# Patient Record
Sex: MALE | Race: WHITE | NOT HISPANIC OR LATINO | Employment: OTHER | URBAN - METROPOLITAN AREA
[De-identification: names, ages, dates, MRNs, and addresses within clinical notes are randomized per-mention and may not be internally consistent; named-entity substitution may affect disease eponyms.]

---

## 2021-05-26 ENCOUNTER — RECORDS - HEALTHEAST (OUTPATIENT)
Dept: ADMINISTRATIVE | Facility: CLINIC | Age: 61
End: 2021-05-26

## 2021-05-31 ENCOUNTER — RECORDS - HEALTHEAST (OUTPATIENT)
Dept: ADMINISTRATIVE | Facility: CLINIC | Age: 61
End: 2021-05-31

## 2021-08-30 ENCOUNTER — HOSPITAL ENCOUNTER (EMERGENCY)
Facility: HOSPITAL | Age: 61
Discharge: HOME | End: 2021-08-30
Attending: EMERGENCY MEDICINE | Admitting: EMERGENCY MEDICINE
Payer: COMMERCIAL

## 2021-08-30 ENCOUNTER — APPOINTMENT (OUTPATIENT)
Dept: RADIOLOGY | Facility: HOSPITAL | Age: 61
End: 2021-08-30
Attending: EMERGENCY MEDICINE
Payer: COMMERCIAL

## 2021-08-30 VITALS
DIASTOLIC BLOOD PRESSURE: 66 MMHG | HEART RATE: 76 BPM | WEIGHT: 175 LBS | TEMPERATURE: 97.5 F | RESPIRATION RATE: 14 BRPM | SYSTOLIC BLOOD PRESSURE: 119 MMHG | OXYGEN SATURATION: 96 %

## 2021-08-30 DIAGNOSIS — S61.012A LACERATION OF LEFT THUMB WITHOUT FOREIGN BODY WITHOUT DAMAGE TO NAIL, INITIAL ENCOUNTER: ICD-10-CM

## 2021-08-30 PROCEDURE — 12002 RPR S/N/AX/GEN/TRNK2.6-7.5CM: CPT

## 2021-08-30 PROCEDURE — 99283 EMERGENCY DEPT VISIT LOW MDM: CPT

## 2021-08-30 PROCEDURE — 250N000009 HC RX 250

## 2021-08-30 PROCEDURE — 73140 X-RAY EXAM OF FINGER(S): CPT | Mod: LT

## 2021-08-30 RX ORDER — BACITRACIN ZINC 500 [USP'U]/G
OINTMENT TOPICAL ONCE
Status: COMPLETED | OUTPATIENT
Start: 2021-08-30 | End: 2021-08-30

## 2021-08-30 RX ORDER — BACITRACIN ZINC 500 [USP'U]/G
OINTMENT TOPICAL
Status: COMPLETED
Start: 2021-08-30 | End: 2021-08-30

## 2021-08-30 RX ADMIN — BACITRACIN ZINC: 500 OINTMENT TOPICAL at 16:58

## 2021-08-30 NOTE — ED PROVIDER NOTES
EMERGENCY DEPARTMENT ENCOUNTER      NAME: Shaq Ny  AGE: 61 year old male  YOB: 1960  MRN: [id number]  EVALUATION DATE & TIME: No admission date for patient encounter.    PCP: No primary care provider on file.    ED PROVIDER: Gus Childers M.D.      Chief Complaint   Patient presents with     Laceration         FINAL IMPRESSION:  1. Laceration of left thumb without foreign body without damage to nail, initial encounter          ED COURSE & MEDICAL DECISION MAKIN year old male presents to the Emergency Department for evaluation of thumb laceration.  He has a laceration/skin avulsion to his left dorsal thumb.  There does not seem to be any nerve or tendon involvement in this injury.  X-rays negative for fracture or retained foreign body.  There was an extensive amount of devitalized skin over the area, this was quite narrow and very clearly devitalized, so ultimately I decided to debride most of this to allow for loose approximation of the underlying wound.  Wound was anesthetized, irrigated extensively and closed to the best extent allowed by the missing tissue.  Patient will be directed to follow-up with hand surgery in 1 week for recheck and for suture removal if things are going well.  His wound was dressed with bacitracin.  He left the emergency department stable condition.    At the conclusion of the encounter I discussed the results of all of the tests and the disposition. The questions were answered. The patient or family acknowledged understanding and was agreeable with the care plan.     PPE: Provider wore gloves, N95 mask, eye shield  MEDICATIONS GIVEN IN THE EMERGENCY:  Medications   bacitracin ointment ( Topical Given 21 199)       NEW PRESCRIPTIONS STARTED AT TODAY'S ER VISIT  There are no discharge medications for this patient.         =================================================================    HPI    Patient information was obtained from: Patient    Use  of : N/A      Shaq Ny is a 61 year old male with a pertinent history of diabetes mellitus type 1, and nephrectomy who presents to this ED private car for evaluation of a finger laceration.    Prior to arrival at the ED today (8/30), the patient was working, and sliced his thumb on sheet metal. He visited the Urgent Care, where he ran his wound under running water. The cut is on the top of his left thumb, coming up from the bottom to his nail.    Of note, the patient is unsure of his last tetanus shot, and denies any known allergies.    REVIEW OF SYSTEMS   All systems reviewed and negative except as noted in HPI.    PAST MEDICAL HISTORY:  No past medical history on file.    PAST SURGICAL HISTORY:  No past surgical history on file.        CURRENT MEDICATIONS:    No current facility-administered medications for this encounter.     No current outpatient medications on file.         ALLERGIES:  No Known Allergies    FAMILY HISTORY:  No family history on file.    SOCIAL HISTORY:   Social History     Socioeconomic History     Marital status:      Spouse name: Not on file     Number of children: Not on file     Years of education: Not on file     Highest education level: Not on file   Occupational History     Not on file   Tobacco Use     Smoking status: Not on file   Substance and Sexual Activity     Alcohol use: Not on file     Drug use: Not on file     Sexual activity: Not on file   Other Topics Concern     Not on file   Social History Narrative     Not on file     Social Determinants of Health     Financial Resource Strain:      Difficulty of Paying Living Expenses:    Food Insecurity:      Worried About Running Out of Food in the Last Year:      Ran Out of Food in the Last Year:    Transportation Needs:      Lack of Transportation (Medical):      Lack of Transportation (Non-Medical):    Physical Activity:      Days of Exercise per Week:      Minutes of Exercise per Session:    Stress:       Feeling of Stress :    Social Connections:      Frequency of Communication with Friends and Family:      Frequency of Social Gatherings with Friends and Family:      Attends [employer] Services:      Active Member of Clubs or Organizations:      Attends Club or Organization Meetings:      Marital Status:    Intimate Partner Violence:      Fear of Current or Ex-Partner:      Emotionally Abused:      Physically Abused:      Sexually Abused:        VITALS:  /66   Pulse 76   Temp 97.5  F (36.4  C) (Oral)   Resp 14   Wt 79.4 kg (175 lb)   SpO2 96%     PHYSICAL EXAM    Constitutional: Well developed, Well nourished, NAD.  HENT: Normocephalic, Atraumatic. Neck Supple.  Eyes: EOMI, Conjunctiva normal.  Respiratory: Breathing comfortably on room air. Speaks full sentences easily. Lungs clear to ascultation.  Cardiovascular: Normal heart rate, Regular rhythm. No peripheral edema.  Abdomen: Soft, nontender  Musculoskeletal: There is a 4 cm laceration to the dorsum of the left thumb.  Patient is able to flex, extend, and oppose the thumb normally.  Sensation to pinprick intact on bilateral aspects of the affected thumb.  Integument: Warm, Dry.  Neurologic: Alert & awake, Normal motor function, Normal sensory function, No focal deficits noted.   Psychiatric: Cooperative. Affect appropriate.     LAB:  All pertinent labs reviewed and interpreted.  Labs Ordered and Resulted from Time of ED Arrival Up to the Time of Departure from the ED - No data to display    RADIOLOGY:  Reviewed all pertinent imaging. Please see official radiology report.  Fingers XR, 2-3 views, left   Final Result   IMPRESSION: No fracture or foreign body. Moderate degenerative changes at the first CMC joint.             PROCEDURES:   [facility]    -Laceration Repair    Date/Time: 8/30/2021 6:04 PM  Performed by: Gus Childers MD  Authorized by: Gus Childers MD       ANESTHESIA (see MAR for exact dosages):      Anesthesia method:  Local infiltration    Local anesthetic:  Lidocaine 1% w/o epi  LACERATION DETAILS     Location:  Finger    Finger location:  L thumb    Length (cm):  4    REPAIR TYPE:     Repair type:  Simple      EXPLORATION:     Wound exploration: wound explored through full range of motion and entire depth of wound probed and visualized      Wound extent: no nerve damage, no tendon damage and no underlying fracture      Contaminated: no      TREATMENT:     Area cleansed with:  Saline    Amount of cleaning:  Standard    Irrigation solution:  Sterile saline    SKIN REPAIR     Repair method:  Sutures    Suture size:  3-0    Suture material:  Nylon    Suture technique:  Simple interrupted    Number of sutures:  6    APPROXIMATION     Approximation:  Close    POST-PROCEDURE DETAILS     Dressing:  Antibiotic ointment and sterile dressing      PROCEDURE   Patient Tolerance:  Patient tolerated the procedure well with no immediate complications              I, Marlyn Multani, am serving as a scribe to document services personally performed by Dr. Gus Childers, based on my observation and the provider's statements to me. I, Gus Childers MD attest that Marlyn Multani is acting in a scribe capacity, has observed my performance of the services and has documented them in accordance with my direction.    Gus Childers M.D.  Emergency Medicine  [facility] EMERGENCY DEPARTMENT  [address] [place] [id number] [phone]  Dept: [phone]       Gus Childers MD  08/30/21 [id number]

## 2021-08-30 NOTE — ED NOTES
Pt seen and discharged prior to RN assessment. See provider note. Bacitracin and dressing applied by provider. Gave pt [facility] card.

## 2021-08-30 NOTE — ED TRIAGE NOTES
Patient presents with a flap laceration incurred from a sharp edge on a piece of sheet metal that occurred at about 09:15am today. It is on the posterior portion of his left thumb and is about 4cm long. He is able to move and bend the thumb at both the MCP and IP joints.

## 2021-08-30 NOTE — DISCHARGE INSTRUCTIONS
You were seen today in the [facility] emergency department for large left thumb laceration.  Your laceration was anesthetized, cleaned, and pulled together.  Unfortunately there is a large skin defect impossible to completely close here.  We did place 6 sutures which will need to be removed.  We would recommend that you follow-up with the hand specialists at [facility] in 7 days for a wound check and to have your sutures removed at that time.  Please keep the area clean and dry.  You can continue to apply bacitracin twice a day and keep the area dressed with simple gauze.  Avoid heavy lifting with the right hand for at least 2 weeks or until further instructed by [facility].

## 2022-01-30 ENCOUNTER — APPOINTMENT (OUTPATIENT)
Dept: RADIOLOGY | Facility: HOSPITAL | Age: 62
End: 2022-01-30
Attending: EMERGENCY MEDICINE
Payer: COMMERCIAL

## 2022-01-30 ENCOUNTER — APPOINTMENT (OUTPATIENT)
Dept: CT IMAGING | Facility: HOSPITAL | Age: 62
End: 2022-01-30
Attending: INTERNAL MEDICINE
Payer: COMMERCIAL

## 2022-01-30 ENCOUNTER — HOSPITAL ENCOUNTER (OUTPATIENT)
Facility: HOSPITAL | Age: 62
Setting detail: OBSERVATION
Discharge: HOME | End: 2022-01-31
Attending: EMERGENCY MEDICINE | Admitting: INTERNAL MEDICINE
Payer: COMMERCIAL

## 2022-01-30 ENCOUNTER — APPOINTMENT (OUTPATIENT)
Dept: CT IMAGING | Facility: HOSPITAL | Age: 62
End: 2022-01-30
Attending: EMERGENCY MEDICINE
Payer: COMMERCIAL

## 2022-01-30 DIAGNOSIS — M79.622 PAIN OF LEFT UPPER ARM: ICD-10-CM

## 2022-01-30 DIAGNOSIS — E10.9 TYPE 1 DIABETES MELLITUS WITHOUT COMPLICATION (H): Primary | ICD-10-CM

## 2022-01-30 DIAGNOSIS — Z86.39 HISTORY OF DIABETES MELLITUS: ICD-10-CM

## 2022-01-30 DIAGNOSIS — M54.12 CERVICAL RADICULOPATHY: ICD-10-CM

## 2022-01-30 DIAGNOSIS — R55 NEAR SYNCOPE: ICD-10-CM

## 2022-01-30 DIAGNOSIS — R07.9 CHEST PAIN, UNSPECIFIED TYPE: ICD-10-CM

## 2022-01-30 LAB
ALBUMIN SERPL-MCNC: 3.6 G/DL (ref 3.5–5)
ALBUMIN UR-MCNC: NEGATIVE MG/DL
ALP SERPL-CCNC: 74 U/L (ref 45–120)
ALT SERPL W P-5'-P-CCNC: 14 U/L (ref 0–45)
ANION GAP SERPL CALCULATED.3IONS-SCNC: 8 MMOL/L (ref 5–18)
APPEARANCE UR: CLEAR
AST SERPL W P-5'-P-CCNC: 17 U/L (ref 0–40)
BASOPHILS # BLD AUTO: 0 10E3/UL (ref 0–0.2)
BASOPHILS NFR BLD AUTO: 0 %
BILIRUB DIRECT SERPL-MCNC: 0.2 MG/DL
BILIRUB SERPL-MCNC: 0.7 MG/DL (ref 0–1)
BILIRUB UR QL STRIP: NEGATIVE
BUN SERPL-MCNC: 15 MG/DL (ref 8–22)
C REACTIVE PROTEIN LHE: 0.2 MG/DL (ref 0–0.8)
CALCIUM SERPL-MCNC: 8.6 MG/DL (ref 8.5–10.5)
CHLORIDE BLD-SCNC: 108 MMOL/L (ref 98–107)
CHOLEST SERPL-MCNC: 158 MG/DL
CO2 SERPL-SCNC: 26 MMOL/L (ref 22–31)
COLOR UR AUTO: ABNORMAL
CREAT SERPL-MCNC: 0.97 MG/DL (ref 0.7–1.3)
EOSINOPHIL # BLD AUTO: 0.1 10E3/UL (ref 0–0.7)
EOSINOPHIL NFR BLD AUTO: 0 %
ERYTHROCYTE [DISTWIDTH] IN BLOOD BY AUTOMATED COUNT: 12.9 % (ref 10–15)
GFR SERPL CREATININE-BSD FRML MDRD: 88 ML/MIN/1.73M2
GLUCOSE BLD-MCNC: 89 MG/DL (ref 70–125)
GLUCOSE BLDC GLUCOMTR-MCNC: 130 MG/DL (ref 70–99)
GLUCOSE BLDC GLUCOMTR-MCNC: 286 MG/DL (ref 70–99)
GLUCOSE BLDC GLUCOMTR-MCNC: 56 MG/DL (ref 70–99)
GLUCOSE BLDC GLUCOMTR-MCNC: 65 MG/DL (ref 70–99)
GLUCOSE UR STRIP-MCNC: NEGATIVE MG/DL
HBA1C MFR BLD: 9.4 %
HCT VFR BLD AUTO: 45.4 % (ref 40–53)
HDLC SERPL-MCNC: 33 MG/DL
HGB BLD-MCNC: 14.8 G/DL (ref 13.3–17.7)
HGB UR QL STRIP: NEGATIVE
HOLD SPECIMEN: NORMAL
IMM GRANULOCYTES # BLD: 0.1 10E3/UL
IMM GRANULOCYTES NFR BLD: 0 %
KETONES UR STRIP-MCNC: NEGATIVE MG/DL
LDLC SERPL CALC-MCNC: 101 MG/DL
LEUKOCYTE ESTERASE UR QL STRIP: NEGATIVE
LIPASE SERPL-CCNC: <9 U/L (ref 0–52)
LYMPHOCYTES # BLD AUTO: 0.6 10E3/UL (ref 0.8–5.3)
LYMPHOCYTES NFR BLD AUTO: 5 %
MCH RBC QN AUTO: 30.7 PG (ref 26.5–33)
MCHC RBC AUTO-ENTMCNC: 32.6 G/DL (ref 31.5–36.5)
MCV RBC AUTO: 94 FL (ref 78–100)
MONOCYTES # BLD AUTO: 0.7 10E3/UL (ref 0–1.3)
MONOCYTES NFR BLD AUTO: 5 %
NEUTROPHILS # BLD AUTO: 12.1 10E3/UL (ref 1.6–8.3)
NEUTROPHILS NFR BLD AUTO: 90 %
NITRATE UR QL: NEGATIVE
NRBC # BLD AUTO: 0 10E3/UL
NRBC BLD AUTO-RTO: 0 /100
PH UR STRIP: 6 [PH] (ref 5–7)
PLATELET # BLD AUTO: 226 10E3/UL (ref 150–450)
POTASSIUM BLD-SCNC: 4.3 MMOL/L (ref 3.5–5)
PROT SERPL-MCNC: 6.1 G/DL (ref 6–8)
RBC # BLD AUTO: 4.82 10E6/UL (ref 4.4–5.9)
SARS-COV-2 RNA RESP QL NAA+PROBE: NEGATIVE
SODIUM SERPL-SCNC: 142 MMOL/L (ref 136–145)
SP GR UR STRIP: >1.05 (ref 1–1.03)
TRIGL SERPL-MCNC: 122 MG/DL
TROPONIN I SERPL-MCNC: <0.01 NG/ML (ref 0–0.29)
TROPONIN I SERPL-MCNC: <0.01 NG/ML (ref 0–0.29)
TSH SERPL DL<=0.005 MIU/L-ACNC: 2.37 UIU/ML (ref 0.3–5)
UROBILINOGEN UR STRIP-MCNC: <2 MG/DL
WBC # BLD AUTO: 13.5 10E3/UL (ref 4–11)

## 2022-01-30 PROCEDURE — 73020 X-RAY EXAM OF SHOULDER: CPT | Mod: LT

## 2022-01-30 PROCEDURE — C9803 HOPD COVID-19 SPEC COLLECT: HCPCS

## 2022-01-30 PROCEDURE — 85025 COMPLETE CBC W/AUTO DIFF WBC: CPT | Performed by: EMERGENCY MEDICINE

## 2022-01-30 PROCEDURE — 99285 EMERGENCY DEPT VISIT HI MDM: CPT | Mod: 25

## 2022-01-30 PROCEDURE — 80061 LIPID PANEL: CPT | Performed by: INTERNAL MEDICINE

## 2022-01-30 PROCEDURE — 83690 ASSAY OF LIPASE: CPT | Performed by: EMERGENCY MEDICINE

## 2022-01-30 PROCEDURE — 258N000003 HC RX IP 258 OP 636: Performed by: INTERNAL MEDICINE

## 2022-01-30 PROCEDURE — 84443 ASSAY THYROID STIM HORMONE: CPT | Performed by: INTERNAL MEDICINE

## 2022-01-30 PROCEDURE — 250N000011 HC RX IP 250 OP 636: Performed by: EMERGENCY MEDICINE

## 2022-01-30 PROCEDURE — 36415 COLL VENOUS BLD VENIPUNCTURE: CPT | Performed by: EMERGENCY MEDICINE

## 2022-01-30 PROCEDURE — G0378 HOSPITAL OBSERVATION PER HR: HCPCS

## 2022-01-30 PROCEDURE — 86140 C-REACTIVE PROTEIN: CPT | Performed by: EMERGENCY MEDICINE

## 2022-01-30 PROCEDURE — 70450 CT HEAD/BRAIN W/O DYE: CPT

## 2022-01-30 PROCEDURE — 84484 ASSAY OF TROPONIN QUANT: CPT | Performed by: EMERGENCY MEDICINE

## 2022-01-30 PROCEDURE — 81003 URINALYSIS AUTO W/O SCOPE: CPT | Performed by: INTERNAL MEDICINE

## 2022-01-30 PROCEDURE — 80053 COMPREHEN METABOLIC PANEL: CPT | Performed by: EMERGENCY MEDICINE

## 2022-01-30 PROCEDURE — 96374 THER/PROPH/DIAG INJ IV PUSH: CPT | Mod: 59

## 2022-01-30 PROCEDURE — 84484 ASSAY OF TROPONIN QUANT: CPT | Performed by: INTERNAL MEDICINE

## 2022-01-30 PROCEDURE — 82248 BILIRUBIN DIRECT: CPT | Performed by: EMERGENCY MEDICINE

## 2022-01-30 PROCEDURE — 71275 CT ANGIOGRAPHY CHEST: CPT

## 2022-01-30 PROCEDURE — 71045 X-RAY EXAM CHEST 1 VIEW: CPT

## 2022-01-30 PROCEDURE — 93005 ELECTROCARDIOGRAM TRACING: CPT | Performed by: EMERGENCY MEDICINE

## 2022-01-30 PROCEDURE — 36415 COLL VENOUS BLD VENIPUNCTURE: CPT | Performed by: INTERNAL MEDICINE

## 2022-01-30 PROCEDURE — 82962 GLUCOSE BLOOD TEST: CPT

## 2022-01-30 PROCEDURE — 96375 TX/PRO/DX INJ NEW DRUG ADDON: CPT

## 2022-01-30 PROCEDURE — 73070 X-RAY EXAM OF ELBOW: CPT | Mod: LT

## 2022-01-30 PROCEDURE — 250N000013 HC RX MED GY IP 250 OP 250 PS 637: Performed by: INTERNAL MEDICINE

## 2022-01-30 PROCEDURE — 83036 HEMOGLOBIN GLYCOSYLATED A1C: CPT | Performed by: INTERNAL MEDICINE

## 2022-01-30 PROCEDURE — 258N000003 HC RX IP 258 OP 636: Performed by: EMERGENCY MEDICINE

## 2022-01-30 PROCEDURE — 96376 TX/PRO/DX INJ SAME DRUG ADON: CPT

## 2022-01-30 PROCEDURE — 74174 CTA ABD&PLVS W/CONTRAST: CPT

## 2022-01-30 PROCEDURE — 250N000011 HC RX IP 250 OP 636: Performed by: INTERNAL MEDICINE

## 2022-01-30 PROCEDURE — 87635 SARS-COV-2 COVID-19 AMP PRB: CPT | Performed by: EMERGENCY MEDICINE

## 2022-01-30 PROCEDURE — 99220 PR INITIAL OBSERVATION CARE,LEVEL III: CPT | Performed by: INTERNAL MEDICINE

## 2022-01-30 PROCEDURE — 96361 HYDRATE IV INFUSION ADD-ON: CPT

## 2022-01-30 PROCEDURE — 258N000001 HC RX 258: Performed by: INTERNAL MEDICINE

## 2022-01-30 RX ORDER — MORPHINE SULFATE 4 MG/ML
4 INJECTION, SOLUTION INTRAMUSCULAR; INTRAVENOUS ONCE
Status: DISCONTINUED | OUTPATIENT
Start: 2022-01-30 | End: 2022-01-30

## 2022-01-30 RX ORDER — ONDANSETRON 2 MG/ML
4 INJECTION INTRAMUSCULAR; INTRAVENOUS ONCE
Status: COMPLETED | OUTPATIENT
Start: 2022-01-30 | End: 2022-01-30

## 2022-01-30 RX ORDER — AMOXICILLIN 250 MG
1 CAPSULE ORAL 2 TIMES DAILY
Status: DISCONTINUED | OUTPATIENT
Start: 2022-01-30 | End: 2022-01-31 | Stop reason: HOSPADM

## 2022-01-30 RX ORDER — MORPHINE SULFATE 4 MG/ML
4 INJECTION, SOLUTION INTRAMUSCULAR; INTRAVENOUS ONCE
Status: COMPLETED | OUTPATIENT
Start: 2022-01-30 | End: 2022-01-30

## 2022-01-30 RX ORDER — NICOTINE 21 MG/24HR
1 PATCH, TRANSDERMAL 24 HOURS TRANSDERMAL DAILY PRN
Status: DISCONTINUED | OUTPATIENT
Start: 2022-01-30 | End: 2022-01-31 | Stop reason: HOSPADM

## 2022-01-30 RX ORDER — SODIUM CHLORIDE 9 MG/ML
INJECTION, SOLUTION INTRAVENOUS CONTINUOUS
Status: DISCONTINUED | OUTPATIENT
Start: 2022-01-30 | End: 2022-01-30

## 2022-01-30 RX ORDER — ACETAMINOPHEN 650 MG/1
650 SUPPOSITORY RECTAL EVERY 6 HOURS PRN
Status: DISCONTINUED | OUTPATIENT
Start: 2022-01-30 | End: 2022-01-31 | Stop reason: HOSPADM

## 2022-01-30 RX ORDER — POLYETHYLENE GLYCOL 3350 17 G/17G
17 POWDER, FOR SOLUTION ORAL DAILY
Status: DISCONTINUED | OUTPATIENT
Start: 2022-01-31 | End: 2022-01-31 | Stop reason: HOSPADM

## 2022-01-30 RX ORDER — NICOTINE POLACRILEX 4 MG
15-30 LOZENGE BUCCAL
Status: DISCONTINUED | OUTPATIENT
Start: 2022-01-30 | End: 2022-01-31 | Stop reason: HOSPADM

## 2022-01-30 RX ORDER — MORPHINE SULFATE 2 MG/ML
2 INJECTION, SOLUTION INTRAMUSCULAR; INTRAVENOUS EVERY 6 HOURS PRN
Status: DISCONTINUED | OUTPATIENT
Start: 2022-01-30 | End: 2022-01-31 | Stop reason: HOSPADM

## 2022-01-30 RX ORDER — ASPIRIN 81 MG/1
81 TABLET ORAL DAILY
Status: DISCONTINUED | OUTPATIENT
Start: 2022-01-30 | End: 2022-01-31 | Stop reason: HOSPADM

## 2022-01-30 RX ORDER — DEXTROSE MONOHYDRATE 25 G/50ML
25-50 INJECTION, SOLUTION INTRAVENOUS
Status: DISCONTINUED | OUTPATIENT
Start: 2022-01-30 | End: 2022-01-31 | Stop reason: HOSPADM

## 2022-01-30 RX ORDER — ONDANSETRON 4 MG/1
4 TABLET, ORALLY DISINTEGRATING ORAL EVERY 6 HOURS PRN
Status: DISCONTINUED | OUTPATIENT
Start: 2022-01-30 | End: 2022-01-31 | Stop reason: HOSPADM

## 2022-01-30 RX ORDER — OXYCODONE HYDROCHLORIDE 5 MG/1
5 TABLET ORAL EVERY 6 HOURS PRN
Status: DISCONTINUED | OUTPATIENT
Start: 2022-01-30 | End: 2022-01-31 | Stop reason: HOSPADM

## 2022-01-30 RX ORDER — ACETAMINOPHEN 325 MG/1
650 TABLET ORAL EVERY 6 HOURS PRN
Status: DISCONTINUED | OUTPATIENT
Start: 2022-01-30 | End: 2022-01-31 | Stop reason: HOSPADM

## 2022-01-30 RX ORDER — ONDANSETRON 2 MG/ML
4 INJECTION INTRAMUSCULAR; INTRAVENOUS EVERY 6 HOURS PRN
Status: DISCONTINUED | OUTPATIENT
Start: 2022-01-30 | End: 2022-01-31 | Stop reason: HOSPADM

## 2022-01-30 RX ORDER — AMOXICILLIN 250 MG
2 CAPSULE ORAL 2 TIMES DAILY
Status: DISCONTINUED | OUTPATIENT
Start: 2022-01-30 | End: 2022-01-31 | Stop reason: HOSPADM

## 2022-01-30 RX ORDER — IOPAMIDOL 755 MG/ML
100 INJECTION, SOLUTION INTRAVASCULAR ONCE
Status: COMPLETED | OUTPATIENT
Start: 2022-01-30 | End: 2022-01-30

## 2022-01-30 RX ADMIN — SODIUM CHLORIDE: 9 INJECTION, SOLUTION INTRAVENOUS at 18:59

## 2022-01-30 RX ADMIN — ASPIRIN 81 MG: 81 TABLET, COATED ORAL at 20:52

## 2022-01-30 RX ADMIN — DEXTROSE AND SODIUM CHLORIDE: 5; 450 INJECTION, SOLUTION INTRAVENOUS at 20:49

## 2022-01-30 RX ADMIN — MORPHINE SULFATE 2 MG: 2 INJECTION, SOLUTION INTRAMUSCULAR; INTRAVENOUS at 20:49

## 2022-01-30 RX ADMIN — SODIUM CHLORIDE 1000 ML: 9 INJECTION, SOLUTION INTRAVENOUS at 14:14

## 2022-01-30 RX ADMIN — IOPAMIDOL 100 ML: 755 INJECTION, SOLUTION INTRAVENOUS at 16:35

## 2022-01-30 RX ADMIN — DEXTROSE 15 G: 15 GEL ORAL at 20:03

## 2022-01-30 RX ADMIN — ONDANSETRON 4 MG: 2 INJECTION INTRAMUSCULAR; INTRAVENOUS at 14:36

## 2022-01-30 RX ADMIN — DEXTROSE 15 G: 15 GEL ORAL at 19:38

## 2022-01-30 RX ADMIN — MORPHINE SULFATE 4 MG: 4 INJECTION INTRAVENOUS at 14:13

## 2022-01-30 ASSESSMENT — ENCOUNTER SYMPTOMS
DIARRHEA: 1
NUMBNESS: 1
NAUSEA: 0
BLOOD IN STOOL: 0
SHORTNESS OF BREATH: 0
ABDOMINAL PAIN: 0
VOMITING: 1
HEADACHES: 0
BACK PAIN: 1

## 2022-01-30 ASSESSMENT — MIFFLIN-ST. JEOR: SCORE: 1588.24

## 2022-01-30 NOTE — ED TRIAGE NOTES
Arrived via [facility], complaint of back pain with radiation to L) arm.  Vomiting episode x1 in parking lot.  Wife was en route to hospital driving pt when pulled over and called 911.  No cardiac hx.  BP 90s/50s.  No ASA given.  12 Lead OK en route per EMS.  4L NC O2 was applied without check of RA sat prior.  Checked on arrival.  Received 200 CC NS via L) AC IV en route.  Diabetic with POCT glucose 111 en route.

## 2022-01-30 NOTE — ED NOTES
Bed: JNED-06  Expected date: 1/30/22  Expected time:   Means of arrival: Ambulance  Comments:  Alireza  61 M  Arm/back pain/hypotensive

## 2022-01-30 NOTE — ED PROVIDER NOTES
EMERGENCY DEPARTMENT ENCOUNTER      NAME: Shaq Ny  AGE: 61 year old male  YOB: 1960  MRN: [id number]  EVALUATION DATE & TIME: 1/30/2022  1:57 PM    PCP: [facility]    ED PROVIDER: Eugenie Lopez M.D.      CHIEF COMPLAINT     Chief Complaint   Patient presents with     Fall     back pain radiating to L) shoulder     Vomiting         FINAL IMPRESSION:     1. Chest pain, unspecified type    2. Pain of left upper arm    3. History of diabetes mellitus    4. Near syncope          MEDICAL DECISION MAKING:       Pertinent Labs & Imaging studies reviewed. (See chart for details)    61 year old male presents to the Emergency Department for evaluation of left upper back pain left arm pain.  Left elbow pain.  Vomiting    ED Course as of 01/30/22 1813   Sun Jan 30, 2022   1630 61-year-old male presents via EMS for left upper back pain that radiates to the left arm.   1630 Per nurse patient was on his way to the emergency department but then became nauseated wife called 911 as he subsequently vomited.   1631 Patient states he has left upper back pain that radiates sharply down to the left arm.   1631 He fell3 weeks echo he said he stopped his fall with outstretched left hand.  He had wrist pain for about 3 days but subsequently it went away.   1631 About 3 days ago he started having left upper back pain that radiates down to the left arm and is associated tingling of the thumb on the index finger.   1632 When he fell he is very clear that he did not hit his head.  He was not intoxicated he is no longer drinking.  He is upper body did not hit the floor.   1632 He denies any chest pain no abdominal pain he had one episode of vomiting today no hematemesis no recent illnesses.   1632 Patient has a history of diabetes.  She has been vaccinated against Covid.   1632 Per EMS systolic blood pressure was in the 90s.   1632 Examination patient is nontoxic but he has this intermittent sharp  pain that makes him almost jump forward from the bed.  When I asked him to pinpoint it he points to the left upper back and he says that radiated down to the left arm.   1633 When I ranged the left elbow he complains of sharp pain but he has no bony tenderness palpation and no wrist on his palpation.   1633 Left arm there is no erythema no edema he has intact pulses intact strength no evidence of thrombophlebitis.   1633 He has no midline cervical thoracic or lumbar tenderness palpation.  Pain is located in the left upper chest wall no signs of trauma.   1633 Has some rhonchi on auscultation abdomen mostly soft extremities without edema.   1633 Differential diagnosis include but not limited to dissection acute coronary syndrome pneumothorax nerve injury elbow fracture pneumonia among others   1634 IV was established patient placed on cardiac blood pressure and pulse ox monitors.  Given 1 L normal saline and morphine for pain.   1634 Patient given Zofran for nausea.  Evaluation patient feels better.   1715 CT chest no PE no dissection abscess less given the constipation.  Patient and wife updated.  Will plan admission.   1809 Anion Gap: 8   1810 Patient given morphine. Only one dose. I was able to completely evaluate the arm. Clinically there is no evidence of DVT or septic joint.   1810 Patient is oriented x3 normal with no focal deficits. The numbness he has had in the ED exam have been present since he hyperextended the wrist likely secondary to that.   1810 Given his comorbidities of smoking and diabetes plan to admit him to cardiac telemetry for further evaluation. I spoke with the hospitalist accepted patient.   1811 Clinical impression and decision making  61-year-old male history of diabetes smoker presents here with acute left upper back pain radiating down to the left arm near syncope with vomiting while in the car. Quite uncomfortable during examination CT chest negative for dissection clinically no  evidence of septic joint. EKG no acute ST segment elevation troponin negative pain-free currently will admit her for further cardiac evaluation.       Vital Signs:  EKG: Reviewed normal sinus  Imaging: CT chest no dissection no PE  Home Meds: reviewed  ED meds/abx:morphine  Fluids: 1L NS    Labs  K 4.3  Cr 0.97  Wbc 13.5  Hgb 14.8  Platelets 226  Lipase liver function test and CRP are negative.  Troponin negative normal function test    Review of Previous Records  1/27/2022 - Nurse Triage Line, Jenni Hale RN  Patient reports intense pain on left shoulder blade area on the back. States he woke up Tuesday with this an it persists, pretty constant. He states that he has tried ice/heat/otc pain meds and nothing seems to help.  The pain is constant.  Recalls no injury.  Recalls no history of this.  Reports no chest pain or shortness of breath.        Consults  Hospitalist, Dr. Rosenbaum    ED COURSE   1:55 PM I met with the patient, obtained history, performed an initial exam, and discussed options and plan for diagnostics and treatment here in the ED.   3:10 PM Checked in on and updated patient.   4:50 PM Checked in on and updated patient. Spoke with patient's wife over the phone.   5:16 PM Checked in on and updated patient.   5:22 PM Spoke with the hospitalist, Dr. Rosenbaum.    6:11 PM evaluated by hospitalist. Currently comfortable. We'll continue to board pending cardiac tele bed      At the conclusion of the encounter I discussed the results of all of the tests and the disposition. The questions were answered. The patient acknowledged understanding and was agreeable with the care plan.           MEDICATIONS GIVEN IN THE EMERGENCY:     Medications   melatonin tablet 1 mg (has no administration in time range)   ondansetron (ZOFRAN-ODT) ODT tab 4 mg (has no administration in time range)     Or   ondansetron (ZOFRAN) injection 4 mg (has no administration in time range)   acetaminophen (TYLENOL) tablet 650 mg (has no  administration in time range)     Or   acetaminophen (TYLENOL) Suppository 650 mg (has no administration in time range)   senna-docusate (SENOKOT-S/PERICOLACE) 8.6-50 MG per tablet 1 tablet (has no administration in time range)     Or   senna-docusate (SENOKOT-S/PERICOLACE) 8.6-50 MG per tablet 2 tablet (has no administration in time range)   sodium chloride 0.9% infusion (has no administration in time range)   glucose gel 15-30 g (has no administration in time range)     Or   dextrose 50 % injection 25-50 mL (has no administration in time range)     Or   glucagon injection 1 mg (has no administration in time range)   insulin aspart (NovoLOG) injection (RAPID ACTING) (has no administration in time range)   insulin aspart (NovoLOG) injection (RAPID ACTING) (has no administration in time range)   morphine (PF) injection 2 mg (has no administration in time range)   oxyCODONE (ROXICODONE) tablet 5 mg (has no administration in time range)   polyethylene glycol (MIRALAX) Packet 17 g (has no administration in time range)   morphine (PF) injection 4 mg (4 mg Intravenous Given 1/30/22 1413)   0.9% sodium chloride BOLUS (0 mLs Intravenous Stopped 1/30/22 1514)   ondansetron (ZOFRAN) injection 4 mg (4 mg Intravenous Given 1/30/22 1436)   iopamidol (ISOVUE-370) solution 100 mL (100 mLs Intravenous Given 1/30/22 1635)       NEW PRESCRIPTIONS STARTED AT TODAY'S ER VISIT     New Prescriptions    No medications on file          =================================================================    HPI     Patient information was obtained from: patient     Use of : N/A       Shaq Ny is a 61 year old male who presents by EMS for evaluation of left arm pain.     On 1/26/2022 (4 days ago), patient started feeling upper back pain which radiates into his left arm and stops right at the elbow. He is also experiencing numbness and tingling in the 1st and 2nd finger of his left hand. Patient endorses that pain is  "intermittent but he cannot identity any known trigger that causes episodes to occur. He denies that episodes are exacerbated by movement or exertion.     Today, patient was driving with his wife when he had an episode of sudden onset of pain. Patient recalls that his vision when white and he felt like passing out. He told his wife \"I'm having a heart attack. Call 911.\" Patient never lost consciousness and remembers his wife calling EMS. In the parking lot before EMS arrival, patient vomited once. Patient was put on 4L of O2 en route to the ED.     Patient endorses that he was snowblowing yesterday which aggravated his back and left arm. Patient applied heat and took Advil last night. Endorses that he was feeling good this morning.     Patient mentioned that 3 weeks ago, he fell into a pit and broke his fall with his left hand. Endorses that for a couple days afterwards, his left palm hurt but this has resolved. Patient denies head trauma, neck trauma, or loss of consciousness during this fall. Patient also had diarrhea a couple days ago.     Patient has a pertinent medical history of insulin dependent diabetes mellitus type 1. He denies a history of heart problems. Patient is vaccinated x3 against COVID-19. He endorses a social history of smoking but denies alcohol consumption and drug use.     Patient denies shortness of breath, chest pain, headache, wrist pain, elbow pain, abdominal pain, hip pain, knee pain, nausea, bloody stool, or any other complaints at this time.     REVIEW OF SYSTEMS   Review of Systems   Respiratory: Negative for shortness of breath.    Cardiovascular: Negative for chest pain.   Gastrointestinal: Positive for diarrhea (resolved) and vomiting (one episode). Negative for abdominal pain, blood in stool and nausea.   Musculoskeletal: Positive for back pain (upper).        Positive for left arm pain (until elbow). Negative for wrist pain, elbow pain, hip pain, or knee pain.    Neurological: " Positive for numbness (1st and 2nd fingers of left hand). Negative for syncope and headaches.   All other systems reviewed and are negative.      PAST MEDICAL HISTORY:     Past Medical History:   Diagnosis Date     Diabetes mellitus (H)     insulin dependent, onset age 30s       PAST SURGICAL HISTORY:     Past Surgical History:   Procedure Laterality Date     BLADDER SURGERY      4th grade     KIDNEY SURGERY Left     kidney removed in 4th grade     KIDNEY SURGERY Right     partial removal in 4th grade, no hx of transplants         CURRENT MEDICATIONS:   No current outpatient medications on file.       ALLERGIES:   No Known Allergies    FAMILY HISTORY:     Family History   Problem Relation Age of Onset     Coronary Artery Disease Mother      Coronary Artery Disease Father      Coronary Artery Disease Brother        SOCIAL HISTORY:     Social History     Socioeconomic History     Marital status:      Spouse name: Not on file     Number of children: Not on file     Years of education: Not on file     Highest education level: Not on file   Occupational History     Not on file   Tobacco Use     Smoking status: Not on file     Smokeless tobacco: Not on file   Substance and Sexual Activity     Alcohol use: Not on file     Drug use: Not on file     Sexual activity: Not on file   Other Topics Concern     Not on file   Social History Narrative     Not on file     Social Determinants of Health     Financial Resource Strain: Not on file   Food Insecurity: Not on file   Transportation Needs: Not on file   Physical Activity: Not on file   Stress: Not on file   Social Connections: Not on file   Intimate Partner Violence: Not on file   Housing Stability: Not on file       VITALS:   /80   Pulse 63   Temp 97.5  F (36.4  C) (Oral)   Resp 18   SpO2 96%     PHYSICAL EXAM     Physical Exam  Vitals and nursing note reviewed. Exam conducted with a chaperone present.   Constitutional:       Appearance: Normal appearance.  He is normal weight. He is ill-appearing. He is not diaphoretic.      Comments: Appears in pain. screams when he episodes of pain that are nonreproducible   HENT:      Head: Atraumatic.   Cardiovascular:      Rate and Rhythm: Normal rate.   Musculoskeletal:      Cervical back: Normal range of motion.      Comments: Full range of motion of the left shoulder left wrist left elbow. No erythema no edema. Strength is 5/5.   Skin:     Capillary Refill: Capillary refill takes less than 2 seconds.      Comments: Spooning of the nails.   Neurological:      General: No focal deficit present.      Mental Status: He is alert and oriented to person, place, and time.   Psychiatric:         Mood and Affect: Mood normal.         Physical Exam   Constitutional: Well-appearing nontoxic appears in pain when he moves.    Head: Atraumatic.     Nose: Nose normal.     Mouth/Throat: Oropharynx is clear and moist.     Eyes: EOM are normal. Pupils are equal, round, and reactive to light.     Ears: Bilateral pearly white    Neck: Normal range of motion. Neck supple.     Cardiovascular: Normal rate, regular rhythm and normal heart sounds.      Pulmonary/Chest: Normal effort  and breath sounds normal. Crackles in the left lung.    Abdominal: Soft. Bowel sounds are normal.    Musculoskeletal: Normal range of motion. No midline C, T, or L spine tenderness.    Neurological: No focal deficits.    Lymphatics: No edema.     Skin: Skin is warm and dry.     Psychiatric: Normal mood and affect. Behavior is normal.       LAB:     All pertinent labs reviewed and interpreted.  Labs Ordered and Resulted from Time of ED Arrival to Time of ED Departure   BASIC METABOLIC PANEL - Abnormal       Result Value    Sodium 142      Potassium 4.3      Chloride 108 (*)     Carbon Dioxide (CO2) 26      Anion Gap 8      Urea Nitrogen 15      Creatinine 0.97      Calcium 8.6      Glucose 89      GFR Estimate 88     CBC WITH PLATELETS AND DIFFERENTIAL - Abnormal    WBC  Count 13.5 (*)     RBC Count 4.82      Hemoglobin 14.8      Hematocrit 45.4      MCV 94      MCH 30.7      MCHC 32.6      RDW 12.9      Platelet Count 226      % Neutrophils 90      % Lymphocytes 5      % Monocytes 5      % Eosinophils 0      % Basophils 0      % Immature Granulocytes 0      NRBCs per 100 WBC 0      Absolute Neutrophils 12.1 (*)     Absolute Lymphocytes 0.6 (*)     Absolute Monocytes 0.7      Absolute Eosinophils 0.1      Absolute Basophils 0.0      Absolute Immature Granulocytes 0.1      Absolute NRBCs 0.0     TROPONIN I - Normal    Troponin I <0.01     CRP INFLAMMATION - Normal    CRP 0.2     HEPATIC FUNCTION PANEL - Normal    Bilirubin Total 0.7      Bilirubin Direct 0.2      Protein Total 6.1      Albumin 3.6      Alkaline Phosphatase 74      AST 17      ALT 14     LIPASE - Normal    Lipase <9     COVID-19 VIRUS (CORONAVIRUS) BY PCR - Normal    SARS CoV2 PCR Negative     TROPONIN I   GLUCOSE MONITOR NURSING POCT   HEMOGLOBIN A1C   GLUCOSE MONITOR NURSING POCT   UA MACROSCOPIC WITH REFLEX TO MICRO AND CULTURE   TSH        RADIOLOGY:     Reviewed all pertinent imaging. Please see official radiology report.  CTA Chest Abdomen Pelvis w Contrast   Final Result   IMPRESSION:   1.  No evidence for aortic dissection or pulmonary emboli.   2.  No evidence for acute pulmonary disease.   3.  No findings to account for the patient's left chest and arm pain.   4.  Absent left kidney.   5.  Constipation.         XR Chest Port 1 View   Final Result   IMPRESSION: Low lung volumes. Mild left basilar atelectasis versus scarring. The lungs are otherwise clear. No definite pleural effusion. Normal heart size.      Elbow XR,  2 views, left   Final Result   IMPRESSION: No acute fracture or malalignment. Mild elbow joint degenerative changes. No elbow joint effusion.      Shoulder XR, left,  one vw PORTABLE   Final Result   IMPRESSION: Normal joint spaces and alignment. No fracture.       NM Lexiscan stress test     (Results Pending)        EKG:     EKG #1  Sinus bradycardia normal anterior progression normal axis    Time:005995    Ventricular rate 57 bmp  Axis normal  GA interval 170  ms  QRS duration 82 ms  QT//455 ms    Compared to previous EKG on September 4, 2019 hyperacute T waves seen in V3 V4 no longer seen.  Rate was 70 then.  Otherwise no changes.  I have independently reviewed and interpreted the EKG(s) documented above.      PROCEDURES:     Procedures      I, Antonette Elkin, am serving as a scribe to document services personally performed by Dr. Lopez based on my observation and the provider's statements to me. I, Eugenie Lopez MD attest that Antonette Granger is acting in a scribe capacity, has observed my performance of the services and has documented them in accordance with my direction.    Eugenie Lopez M.D.  Emergency Medicine  [facility] EMERGENCY DEPARTMENT  [facility] [facility] [address]  [phone]  Dept: [phone]     Eugenie Lopez MD  01/30/22 2550

## 2022-01-31 ENCOUNTER — APPOINTMENT (OUTPATIENT)
Dept: MRI IMAGING | Facility: HOSPITAL | Age: 62
End: 2022-01-31
Attending: INTERNAL MEDICINE
Payer: COMMERCIAL

## 2022-01-31 ENCOUNTER — APPOINTMENT (OUTPATIENT)
Dept: NUCLEAR MEDICINE | Facility: HOSPITAL | Age: 62
End: 2022-01-31
Attending: INTERNAL MEDICINE
Payer: COMMERCIAL

## 2022-01-31 ENCOUNTER — APPOINTMENT (OUTPATIENT)
Dept: CARDIOLOGY | Facility: HOSPITAL | Age: 62
End: 2022-01-31
Attending: INTERNAL MEDICINE
Payer: COMMERCIAL

## 2022-01-31 VITALS
TEMPERATURE: 97.5 F | HEART RATE: 65 BPM | HEIGHT: 71 IN | WEIGHT: 170 LBS | BODY MASS INDEX: 23.8 KG/M2 | RESPIRATION RATE: 17 BRPM | OXYGEN SATURATION: 91 % | DIASTOLIC BLOOD PRESSURE: 66 MMHG | SYSTOLIC BLOOD PRESSURE: 133 MMHG

## 2022-01-31 LAB
ANION GAP SERPL CALCULATED.3IONS-SCNC: 5 MMOL/L (ref 5–18)
BUN SERPL-MCNC: 16 MG/DL (ref 8–22)
CALCIUM SERPL-MCNC: 8.2 MG/DL (ref 8.5–10.5)
CHLORIDE BLD-SCNC: 105 MMOL/L (ref 98–107)
CO2 SERPL-SCNC: 26 MMOL/L (ref 22–31)
CREAT SERPL-MCNC: 1.08 MG/DL (ref 0.7–1.3)
CV STRESS CURRENT BP HE: NORMAL
CV STRESS CURRENT HR HE: 65
CV STRESS CURRENT HR HE: 67
CV STRESS CURRENT HR HE: 71
CV STRESS CURRENT HR HE: 71
CV STRESS CURRENT HR HE: 72
CV STRESS CURRENT HR HE: 73
CV STRESS CURRENT HR HE: 73
CV STRESS CURRENT HR HE: 74
CV STRESS CURRENT HR HE: 75
CV STRESS CURRENT HR HE: 76
CV STRESS CURRENT HR HE: 76
CV STRESS CURRENT HR HE: 82
CV STRESS CURRENT HR HE: 85
CV STRESS CURRENT HR HE: 85
CV STRESS CURRENT HR HE: 87
CV STRESS CURRENT HR HE: 87
CV STRESS CURRENT HR HE: 91
CV STRESS CURRENT HR HE: 91
CV STRESS DEVIATION TIME HE: NORMAL
CV STRESS ECHO PERCENT HR HE: NORMAL
CV STRESS EXERCISE STAGE HE: NORMAL
CV STRESS FINAL RESTING BP HE: NORMAL
CV STRESS FINAL RESTING HR HE: 71
CV STRESS MAX HR HE: 93
CV STRESS MAX TREADMILL GRADE HE: 0
CV STRESS MAX TREADMILL SPEED HE: 0
CV STRESS PEAK DIA BP HE: NORMAL
CV STRESS PEAK SYS BP HE: NORMAL
CV STRESS PHASE HE: NORMAL
CV STRESS PROTOCOL HE: NORMAL
CV STRESS RESTING PT POSITION HE: NORMAL
CV STRESS ST DEVIATION AMOUNT HE: NORMAL
CV STRESS ST DEVIATION ELEVATION HE: NORMAL
CV STRESS ST EVELATION AMOUNT HE: NORMAL
CV STRESS TEST TYPE HE: NORMAL
CV STRESS TOTAL STAGE TIME MIN 1 HE: NORMAL
ERYTHROCYTE [DISTWIDTH] IN BLOOD BY AUTOMATED COUNT: 12.9 % (ref 10–15)
GFR SERPL CREATININE-BSD FRML MDRD: 77 ML/MIN/1.73M2
GLUCOSE BLD-MCNC: 375 MG/DL (ref 70–125)
GLUCOSE BLDC GLUCOMTR-MCNC: 306 MG/DL (ref 70–99)
GLUCOSE BLDC GLUCOMTR-MCNC: 336 MG/DL (ref 70–99)
GLUCOSE BLDC GLUCOMTR-MCNC: 365 MG/DL (ref 70–99)
GLUCOSE BLDC GLUCOMTR-MCNC: 374 MG/DL (ref 70–99)
HCT VFR BLD AUTO: 41.7 % (ref 40–53)
HGB BLD-MCNC: 13.7 G/DL (ref 13.3–17.7)
MCH RBC QN AUTO: 31 PG (ref 26.5–33)
MCHC RBC AUTO-ENTMCNC: 32.9 G/DL (ref 31.5–36.5)
MCV RBC AUTO: 94 FL (ref 78–100)
NUC STRESS EJECTION FRACTION: 65 %
PLATELET # BLD AUTO: 209 10E3/UL (ref 150–450)
POTASSIUM BLD-SCNC: 4.9 MMOL/L (ref 3.5–5)
RATE PRESSURE PRODUCT: NORMAL
RBC # BLD AUTO: 4.42 10E6/UL (ref 4.4–5.9)
SODIUM SERPL-SCNC: 136 MMOL/L (ref 136–145)
STRESS ECHO BASELINE DIASTOLIC HE: 76
STRESS ECHO BASELINE HR: 67
STRESS ECHO BASELINE SYSTOLIC BP: 131
STRESS ECHO CALCULATED PERCENT HR: 59 %
STRESS ECHO LAST STRESS DIASTOLIC BP: 69
STRESS ECHO LAST STRESS HR: 85
STRESS ECHO LAST STRESS SYSTOLIC BP: 137
STRESS ECHO TARGET HR: 157
TROPONIN I SERPL-MCNC: <0.01 NG/ML (ref 0–0.29)
WBC # BLD AUTO: 6.4 10E3/UL (ref 4–11)

## 2022-01-31 PROCEDURE — 250N000013 HC RX MED GY IP 250 OP 250 PS 637: Performed by: INTERNAL MEDICINE

## 2022-01-31 PROCEDURE — G0378 HOSPITAL OBSERVATION PER HR: HCPCS

## 2022-01-31 PROCEDURE — 78452 HT MUSCLE IMAGE SPECT MULT: CPT | Mod: 26 | Performed by: INTERNAL MEDICINE

## 2022-01-31 PROCEDURE — 78452 HT MUSCLE IMAGE SPECT MULT: CPT

## 2022-01-31 PROCEDURE — 93018 CV STRESS TEST I&R ONLY: CPT | Performed by: INTERNAL MEDICINE

## 2022-01-31 PROCEDURE — 258N000003 HC RX IP 258 OP 636: Performed by: HOSPITALIST

## 2022-01-31 PROCEDURE — 343N000001 HC RX 343: Performed by: INTERNAL MEDICINE

## 2022-01-31 PROCEDURE — 96361 HYDRATE IV INFUSION ADD-ON: CPT

## 2022-01-31 PROCEDURE — 80048 BASIC METABOLIC PNL TOTAL CA: CPT | Performed by: INTERNAL MEDICINE

## 2022-01-31 PROCEDURE — 93016 CV STRESS TEST SUPVJ ONLY: CPT | Performed by: INTERNAL MEDICINE

## 2022-01-31 PROCEDURE — A9500 TC99M SESTAMIBI: HCPCS | Performed by: INTERNAL MEDICINE

## 2022-01-31 PROCEDURE — 99217 PR OBSERVATION CARE DISCHARGE: CPT | Performed by: INTERNAL MEDICINE

## 2022-01-31 PROCEDURE — 85027 COMPLETE CBC AUTOMATED: CPT | Performed by: INTERNAL MEDICINE

## 2022-01-31 PROCEDURE — 250N000011 HC RX IP 250 OP 636: Performed by: INTERNAL MEDICINE

## 2022-01-31 PROCEDURE — 96372 THER/PROPH/DIAG INJ SC/IM: CPT | Mod: 59 | Performed by: INTERNAL MEDICINE

## 2022-01-31 PROCEDURE — 36415 COLL VENOUS BLD VENIPUNCTURE: CPT | Performed by: INTERNAL MEDICINE

## 2022-01-31 PROCEDURE — 84484 ASSAY OF TROPONIN QUANT: CPT | Performed by: INTERNAL MEDICINE

## 2022-01-31 PROCEDURE — 72141 MRI NECK SPINE W/O DYE: CPT

## 2022-01-31 PROCEDURE — 96376 TX/PRO/DX INJ SAME DRUG ADON: CPT

## 2022-01-31 PROCEDURE — 250N000012 HC RX MED GY IP 250 OP 636 PS 637: Performed by: INTERNAL MEDICINE

## 2022-01-31 PROCEDURE — 99204 OFFICE O/P NEW MOD 45 MIN: CPT | Performed by: NURSE PRACTITIONER

## 2022-01-31 PROCEDURE — 96375 TX/PRO/DX INJ NEW DRUG ADDON: CPT | Mod: 59

## 2022-01-31 PROCEDURE — 82962 GLUCOSE BLOOD TEST: CPT

## 2022-01-31 PROCEDURE — 93017 CV STRESS TEST TRACING ONLY: CPT | Performed by: INTERNAL MEDICINE

## 2022-01-31 PROCEDURE — 250N000011 HC RX IP 250 OP 636: Performed by: NURSE PRACTITIONER

## 2022-01-31 RX ORDER — ALBUTEROL SULFATE 0.83 MG/ML
2.5 SOLUTION RESPIRATORY (INHALATION)
Status: DISCONTINUED | OUTPATIENT
Start: 2022-01-31 | End: 2022-01-31 | Stop reason: HOSPADM

## 2022-01-31 RX ORDER — AMINOPHYLLINE 25 MG/ML
50 INJECTION, SOLUTION INTRAVENOUS
Status: DISCONTINUED | OUTPATIENT
Start: 2022-01-31 | End: 2022-01-31 | Stop reason: HOSPADM

## 2022-01-31 RX ORDER — CAFFEINE CITRATE 20 MG/ML
60 SOLUTION INTRAVENOUS
Status: DISCONTINUED | OUTPATIENT
Start: 2022-01-31 | End: 2022-01-31 | Stop reason: HOSPADM

## 2022-01-31 RX ORDER — ACETAMINOPHEN 325 MG/1
650 TABLET ORAL EVERY 6 HOURS PRN
COMMUNITY
Start: 2022-01-31 | End: 2022-08-09

## 2022-01-31 RX ORDER — SODIUM CHLORIDE 9 MG/ML
INJECTION, SOLUTION INTRAVENOUS CONTINUOUS
Status: DISCONTINUED | OUTPATIENT
Start: 2022-01-31 | End: 2022-01-31

## 2022-01-31 RX ORDER — GABAPENTIN 100 MG/1
100 CAPSULE ORAL 3 TIMES DAILY
Qty: 90 CAPSULE | Refills: 0 | Status: SHIPPED | OUTPATIENT
Start: 2022-01-31 | End: 2022-08-09

## 2022-01-31 RX ORDER — DEXAMETHASONE 1 MG
3 TABLET ORAL 2 TIMES DAILY WITH MEALS
Qty: 18 TABLET | Refills: 0 | Status: SHIPPED | OUTPATIENT
Start: 2022-01-31 | End: 2022-02-25

## 2022-01-31 RX ORDER — REGADENOSON 0.08 MG/ML
0.4 INJECTION, SOLUTION INTRAVENOUS ONCE
Status: COMPLETED | OUTPATIENT
Start: 2022-01-31 | End: 2022-01-31

## 2022-01-31 RX ORDER — CAFFEINE 200 MG
200 TABLET ORAL
Status: DISCONTINUED | OUTPATIENT
Start: 2022-01-31 | End: 2022-01-31 | Stop reason: HOSPADM

## 2022-01-31 RX ORDER — DEXAMETHASONE SODIUM PHOSPHATE 4 MG/ML
4 INJECTION, SOLUTION INTRA-ARTICULAR; INTRALESIONAL; INTRAMUSCULAR; INTRAVENOUS; SOFT TISSUE ONCE
Status: COMPLETED | OUTPATIENT
Start: 2022-01-31 | End: 2022-01-31

## 2022-01-31 RX ADMIN — HUMAN INSULIN 25 UNITS: 100 INJECTION, SUSPENSION SUBCUTANEOUS at 12:20

## 2022-01-31 RX ADMIN — ASPIRIN 81 MG: 81 TABLET, COATED ORAL at 10:44

## 2022-01-31 RX ADMIN — MORPHINE SULFATE 2 MG: 2 INJECTION, SOLUTION INTRAMUSCULAR; INTRAVENOUS at 12:39

## 2022-01-31 RX ADMIN — DEXAMETHASONE SODIUM PHOSPHATE 4 MG: 4 INJECTION, SOLUTION INTRA-ARTICULAR; INTRALESIONAL; INTRAMUSCULAR; INTRAVENOUS; SOFT TISSUE at 12:27

## 2022-01-31 RX ADMIN — SODIUM CHLORIDE: 9 INJECTION, SOLUTION INTRAVENOUS at 04:17

## 2022-01-31 RX ADMIN — MORPHINE SULFATE 2 MG: 2 INJECTION, SOLUTION INTRAMUSCULAR; INTRAVENOUS at 06:17

## 2022-01-31 RX ADMIN — INSULIN ASPART 5 UNITS: 100 INJECTION, SOLUTION INTRAVENOUS; SUBCUTANEOUS at 10:39

## 2022-01-31 RX ADMIN — Medication 8.7 MILLICURIE: at 08:14

## 2022-01-31 RX ADMIN — REGADENOSON 0.4 MG: 0.08 INJECTION, SOLUTION INTRAVENOUS at 09:27

## 2022-01-31 RX ADMIN — AMINOPHYLLINE 50 MG: 25 INJECTION, SOLUTION INTRAVENOUS at 09:26

## 2022-01-31 RX ADMIN — Medication 30.3 MILLICURIE: at 09:30

## 2022-01-31 ASSESSMENT — ACTIVITIES OF DAILY LIVING (ADL): DEPENDENT_IADLS:: INDEPENDENT

## 2022-01-31 NOTE — CONSULTS
"NEUROSURGERY CONSULTATION NOTE    Shaq Ny   [address] [facility] [address]  61 year old male  Admission Date/Time: 1/30/2022  1:57 PM  Primary Care Provider: Rena, [facility] Attending Physician: Ike Rosenbaum MD    Neurosurgery was asked to see this patient by Ike Rosenbaum MD for evaluation of left shoulder, arm pain.     PROBLEM LIST:  Active Problems:    History of diabetes mellitus    Pain of left upper arm    Chest pain, unspecified type       Neurosurgery Attending: The patient's clinical examination, laboratory data, and plan was discussed with Dr Quiroz.     CONSULTATION ASSESSMENT AND PLAN:  Shaq Ny is a 61 year old patient seen at [facility] ED with acute left shoulder pain radiating to his left elbow with associated numbness left thumb and pointer finger. Onset the \"last few weeks\" exacerbated with snow blowing yesterday. At the time of exam his pain is 2/10. Discussed MRI and options for treatment is cervical spine finding's the explanation for his pain. Cardiac nuc med test results pending. On MRI cervical he has severe central canal stenosis C5-6, right lateral recess stenosis, severe right foraminal stenosis. C6-7 left lateral recess disc extrusion with severe left lateral recess and foraminal stenosis with mild canal stenosis. Plan to start with medical management - steroids, PT potential for injection. If no relief with symptoms may need surgical intervention. Discussed need for closer attention and treatment of BG with steroid use as well as potential for surgical intervention - typically goal of A1C less than 8 recommended. Current every day smoker which also has potential for poor wound healing.     6392 ADDENDUM: patient would like to go home per Dr Rosenbaum. Stress test negative. Will plan 3 days of steroids, gabapentin, PT, additional insulin to cover while on steroids. Final surgical plan pending discussion with Dr Quiroz. Will see us OP vs " "spine center.     HPI:  Shaq Ny is a 61 year old male PMH: DM with A1C 9.4. Fell three weeks ago reportedly landing with his left arm stretched out to support his fall. Tells me his pain started \"weeks later\" exacerbated with snow blowing yesterday, holding his arm above his head today for imaging. Currently pain 2/10. Pain is in the left shoulder stopping at his left elbow. No cervical spine pain. Numbness in left thumb, pointer finger which he believes makes using his hand to button his clothing difficult. Denies dropping items or difficulty with his gait. Slightly weak deltoid on the left and hand grasp. Has not had any medical management to date. Gave 4 mg dexamethasone earlier today. Discussed if we order steroids for him his BG will be elevated and he may need more insulin. He states he runs a  at home. MRI with C5-6 disc osteophyte severe cental canal stenosis, right lateral recess stenosis, severe right foraminal stenosis. C6-7 osteophyte with broad based disc osteophyte with left lateral recess disc extrusion. Severe left lateral recess/foraminal stenosis, mild canal stenosis. XR shoulder and elbow negative. Stress test pending from today to R/O cardiac cause for his pain. Current daily smoker.     Past Medical History:   Diagnosis Date     Diabetes mellitus (H)     insulin dependent, onset age 30s     Past Surgical History:   Procedure Laterality Date     BLADDER SURGERY      4th grade     KIDNEY SURGERY Left     kidney removed in 4th grade     KIDNEY SURGERY Right     partial removal in 4th grade, no hx of transplants       REVIEW OF SYSTEMS:  Negative with exception of HPI    MEDICATIONS:  Current Outpatient Medications   Medication Sig Dispense Refill     insulin NPH-Regular 70/30 (HUMULIN 70/30;NOVOLIN 70/30) (70-30) 100 UNIT/ML vial Inject 20 Units Subcutaneous daily (with breakfast)       insulin NPH-Regular 70/30 (HUMULIN 70/30;NOVOLIN 70/30) (70-30) 100 UNIT/ML vial Inject 25 Units " "Subcutaneous daily (with dinner)           ALLERGIES/SENSITIVITIES:     No Known Allergies    PERTINENT SOCIAL HISTORY: personally reviewed   Social History     Socioeconomic History     Marital status:      Spouse name: None     Number of children: None     Years of education: None     Highest education level: None   Occupational History     None   Tobacco Use     Smoking status: Current Every Day Smoker     Packs/day: 1.00     Years: 50.00     Pack years: 50.00     Types: Cigarettes     Smokeless tobacco: Never Used   Substance and Sexual Activity     Alcohol use: Never     Drug use: Never     Sexual activity: None   Other Topics Concern     None   Social History Narrative     None     Social Determinants of Health     Financial Resource Strain: Not on file   Food Insecurity: Not on file   Transportation Needs: Not on file   Physical Activity: Not on file   Stress: Not on file   Social Connections: Not on file   Intimate Partner Violence: Not on file   Housing Stability: Not on file         FAMILY HISTORY:  Family History   Problem Relation Age of Onset     Coronary Artery Disease Mother      Coronary Artery Disease Father      Coronary Artery Disease Brother         PHYSICAL EXAM:   Constitutional: /65   Pulse 65   Temp 97.5  F (36.4  C) (Oral)   Resp 17   Ht 1.803 m (5' 11\")   Wt 77.1 kg (170 lb)   SpO2 95%   BMI 23.71 kg/m       Mental Status: A & O in no acute distress. Affect is appropriate. Speech is fluent. Recent and remote memory are intact. Attention span and concentration are normal.     Motor: No pronator drift of upper extremity. Normal bulk and tone all muscle groups of upper and lower extremities.    Strength:   biceps 5/5 right, 5/5 left    Triceps 5/5 right, 5/5 left   Deltoid 5/5 right, 4+/5 left   Hand grasp 5/5 right, 4+/5 left   Hip flexors 5/5 right, 5/5 left   Quadriceps: 5/5 right, 5/5 left   Ankle dorsiflexion: 5/5 right, 5/5 left   Extensor hallicus longus: 5/5 right, " 5/5 left   Ankle plantar flexion : 5/5 right, 5/5 left      Sensory: Sensation intact bilaterally to light touch.     Coordination: gait not tested.      Reflexes: supinator, biceps, triceps, knee/ ankle jerk intact. No hoffmans/babinski/ clonus.    IMAGING:  I personally reviewed all radiographic images, shared with the patient and discussed with Dr Quiroz.     (non critical care) I spent more than 45 minutes in this apt, examining the pt, reviewing the scans, reviewing notes from chart, discussing treatment options with risks and benefits and coordinating care. >50 % clinic time was spent in face to face counseling and coordinating care    VIOLETA Damon [facility] Neurosurgery        Cc:   No referring provider defined for this encounter.    Rena, Karla Vasquez  [unfilled]

## 2022-01-31 NOTE — PROGRESS NOTES
Nuclear Medicine Lexiscan Stress Test:  Sitting protocol.  0.4 mg IV lexiscan administered.  Peak VS:  HR:  84  BP:  137/69.  Symptoms:  Received with severe left shoulder and arm pain pt rating as 8/10.  Noted has received MS 2 mg @ 6:17 am.  Pt states raising arms up over head for imaging increased pain.  Stated sitting upright in chair most comfortable for test.  Pt experienced mild SOB with nausea during test that resolved following administration of 50 mg IV aminophylline.  Shoulder and arm pain decreased to 7/10 by 2:36 minutes into recovery and remained 7/10 throughout rest of recovery.  Nuclear imaging and interpretation pending.

## 2022-01-31 NOTE — ED NOTES
Dr. Mckay paged for Pt being hypoglycemic. Intial BG at 1930 was 56, Pt was asymptomatic. Followed protocol, recheck BG was 65. Repeated protocol, recheck . MD changed IVF to D5 .45NS @ 100ml/hr.     MRI came to scan Pt while he was hypoglycemic, writer delayed scan. MRI staff stated would have to call in the on call for MRI to be completed later if could not be completed at this time. Dr. Mckay gave verbal ok to wait for MRI to be completed until tomorrow.

## 2022-01-31 NOTE — ED NOTES
"[facility] ED Handoff Report    ED Chief Complaint: Left should pain with N/T to first 2 digits. Syncopal episode     ED Diagnosis:  (R07.9) Chest pain, unspecified type  Comment: negative trops. Pt denies actual chest pain   Plan: stress test    (M79.622) Pain of left upper arm  Comment: PRN Morphine   Plan: Cervical MRI     (Z86.39) History of diabetes mellitus  Comment: hypoglycemic last night and then hyperglycemic during the night  Plan: NPO, BS QUID with sliding scale     (R55) Near syncope  Comment:   Plan:        PMH:    Past Medical History:   Diagnosis Date     Diabetes mellitus (H)     insulin dependent, onset age 30s        Code Status:  Full Code     Falls Risk: Yes Band: Applied    Current Living Situation/Residence: lives with a significant other     Elimination Status: Continent: Yes     Activity Level: SBA    Patients Preferred Language:  English     Needed: No    Vital Signs:  /58   Pulse 65   Temp 97.5  F (36.4  C) (Oral)   Resp 17   Ht 1.803 m (5' 11\")   Wt 77.1 kg (170 lb)   SpO2 95%   BMI 23.71 kg/m       Cardiac Rhythm: SB-SR    Pain Score: 5/10    Is the Patient Confused:  No    Last Food or Drink: 1/30/22 at 2000    Focused Assessment: Left shoulder pain with N/T to first 2 digits. Denies any CP. Syncopal episode PTA, was hypotensive upon arrival, normal tensive now     Tests Performed: Done: Labs and Imaging    Treatments Provided:  IV Morphine, IVF     Family Dynamics/Concerns: No    Family Updated On Visitor Policy: No    Plan of Care Communicated to Family: No    Who Was Updated about Plan of Care: PT updating family independently     Belongings Checklist Done and Signed by Patient: No    Medications sent with patient: oral meds and insulin pen     Covid: asymptomatic , negative    Additional Information: Plan for stress test today. Cervical MRI completed this am.     RN: Yolanda Mercedes   1/31/2022 6:04 AM       "

## 2022-01-31 NOTE — CONSULTS
"Care Management Initial Consult    General Information  Assessment completed with: Shaq Hewitt  Type of CM/SW Visit: Initial Assessment    Primary Care Provider verified and updated as needed: Yes   Readmission within the last 30 days: no previous admission in last 30 days      Reason for Consult: discharge planning  Advance Care Planning: Advance Care Planning Reviewed: other (comment) (\"I don't have one\")          Communication Assessment  Patient's communication style: spoken language (English or Bilingual)    Hearing Difficulty or Deaf: no   Wear Glasses or Blind: yes    Cognitive  Cognitive/Neuro/Behavioral: WDL                      Living Environment:   People in home: parent(s)     Current living Arrangements: house      Able to return to prior arrangements: yes       Family/Social Support:  Care provided by: self  Provides care for: parent(s)     Significant Other,Parent(s)       [place]  Description of Support System: Supportive,Involved    Support Assessment: Adequate family and caregiver support,Adequate social supports,Patient communicates needs well met    Current Resources:   Patient receiving home care services: No     Community Resources: None  Equipment currently used at home: glucometer  Supplies currently used at home: Diabetic Supplies,Other (\"glasses\")    Employment/Financial:  Employment Status: employed full-time     Employment/ Comments: \"no  benefits\"  Financial Concerns:     Referral to Financial Counselor: No       Lifestyle & Psychosocial Needs:  Social Determinants of Health     Tobacco Use: High Risk     Smoking Tobacco Use: Current Every Day Smoker     Smokeless Tobacco Use: Never Used   Alcohol Use: Not on file   Financial Resource Strain: Not on file   Food Insecurity: Not on file   Transportation Needs: Not on file   Physical Activity: Not on file   Stress: Not on file   Social Connections: Not on file   Intimate Partner Violence: Not on file   Depression: Not on " "file   Housing Stability: Not on file       Functional Status:  Prior to admission patient needed assistance:   Dependent ADLs:: Independent,Ambulation-no assistive device  Dependent IADLs:: Independent       Mental Health Status:          Chemical Dependency Status:                Values/Beliefs:  Spiritual, Cultural Beliefs, Restoration Practices, Values that affect care:                 Additional Information:  Shaq lives in a house with his mother. He is his mother's main caregiver. He states, \"she will be ok while I am in the hospital.\"    He is independent with ADLs at baseline and drives and works full time.    Likely no discharge needs at this time.    What is Observation was given.    Malgorzata Singh RN      "

## 2022-01-31 NOTE — ED NOTES
Pt hyperglycemic with . Had D5 .45NS infusing at 100ml/hr. Dr. Grzegorz monroe to change fluids. Fluids changed back to NS at 100ml/hr. Pt NPO for stress test. VSS. No additional orders

## 2022-01-31 NOTE — DISCHARGE SUMMARY
[facility]  Hospitalist Discharge Summary      Date of Admission:  1/30/2022  Date of Discharge:  1/31/2022  Discharging Provider: Ike Rosenbaum MD  Discharge Service: Hospitalist Service    Discharge Diagnoses   Atypical chest pain, ACS is ruled out  Left shoulder/arm pain  Cervical radiculopathy  Diabetes mellitus, insulin-dependent, uncontrolled.  Smoking dependence  Near syncope    Follow-ups Needed After Discharge   Follow-up Appointments     Follow-up and recommended labs and tests       Follow up with primary care provider, [facility],   within 7 days for hospital follow- up.  No follow up labs or test are   needed.    Follow up with neurosurgery as scheduled             Unresulted Labs Ordered in the Past 30 Days of this Admission     No orders found for last 31 day(s).      These results will be followed up by PCP    Discharge Disposition   Discharged to home  Condition at discharge: Stable    Hospital Course   61 year old old male with a past medical history of diabetes mellitus insulin-dependent, smoking dependence who presented to the emergency room for the evaluation of left shoulder/arm pain and near syncope.  He was admitted, please refer to H&P for details     Atypical chest/arm pain  - Etiology is unclear, more of a musculoskeletal with palpation has multiple cardiac risk factors including family history, diabetes, smoking  -Negative CTA chest and abdomen for PE  - Negative initial EKG and troponin for significant acute changes  - Unremarkable monitor on telemetry  - Negative serial troponin  - Negative nuclear stress test with EF of 65%  - Start aspirin  - unremarkable lipid panel  - Continue Tylenol as needed     Near syncope  - Etiology is unclear, possible vasovagal as EKG shows sinus bradycardia on ER presentation  - Improved with IV fluid support  - unremarkable TSH  - Unremarkable monitor on telemetry  - Negative CT head for significant  changes     Diabetes mellitus type 2  - Insulin-dependent  - Cover with insulin sliding scale  - Restart home medication when verified by pharmacy  - Elevated hemoglobin A1c at 9.4  - Patient is hesitant about changing his insulin dose but agrees to increase only by 5 units.  - Discussed with patient initiation of oral hypoglycemic drugs but he declined and wants to wait till he sees his PCP     Cervical radiculopathy  - History of fall  - Patient has been complaining of shoulder pain for the past 4 days that radiates to left arm  - Still has numbness of left first and second fingers on and off  - Negative x-ray shoulder for fracture  - cervical spine MRI shows severe central canal stenosis C5-6, right lateral recess stenosis, severe right foraminal stenosis. C6-7 left lateral recess disc extrusion with severe left lateral recess and foraminal stenosis with mild canal stenosis  -Neurosurgery consult, appreciate input  -Plan for starting steroid, gabapentin and physical therapy with potential for injection if no improvement.  -Patient wants to be home discharged with and agreed to follow-up with neurosurgery as outpatient  - Prescribed dexamethasone, gabapentin on discharge  - Discussed with patient checking his blood glucose closely with initiation of dexamethasone and notify his PCP to adjust the dose of insulin as indicated  - Referred to outpatient PT      Smoking dependence  - Discussed with patient to quit  - Continue nicotine patch    Consultations This Hospital Stay   NEUROSURGERY IP CONSULT  SOCIAL WORK IP CONSULT    Code Status   Full Code    Time Spent on this Encounter   I, Ike Rosenbaum MD, personally saw the patient today and spent greater than 30 minutes discharging this patient.       Ike Rosenbaum MD  [facility] EMERGENCY DEPARTMENT  [facility] [facility] [address]  Phone:  [phone]  ______________________________________________________________________    Physical Exam   Vital Signs:     BP: 135/65 Pulse: 65   Resp: 17 SpO2: 95 % O2 Device: None (Room air)    Weight: 170 lbs 0 oz  General Appearance: Sitting comfortably no acute respiratory distress  Respiratory: Decreased breath sounds on lung bases.  Scattered rhonchi.  No wheeze.  Cardiovascular: Normal heart sound, no murmur.  GI: Normal bowel sounds.  No tenderness.  Skin: No rash  Neurology: Alert, awake, oriented x3.  No focal deficit.       Primary Care Physician   [facility]    Discharge Orders      Physical Therapy Referral      Reason for your hospital stay    Left arm/shoulder pain, ACS is ruled out  Cervical radiculopathy  Uncontrolled diabetes     Follow-up and recommended labs and tests     Follow up with primary care provider, [facility], within 7 days for hospital follow- up.  No follow up labs or test are needed.    Follow up with neurosurgery as scheduled     Activity    Your activity upon discharge: activity as tolerated     Diet    Follow this diet upon discharge: Orders Placed This Encounter      Moderate Consistent Carb (60 g CHO per Meal) Diet       Significant Results and Procedures   Most Recent 3 CBC's:Recent Labs   Lab Test 01/31/22  0730 01/30/22  1424   WBC 6.4 13.5*   HGB 13.7 14.8   MCV 94 94    226     Most Recent 3 BMP's:Recent Labs   Lab Test 01/31/22  1225 01/31/22  1030 01/31/22  0744 01/31/22  0730 01/30/22  1930 01/30/22  1424   NA  --   --   --  136  --  142   POTASSIUM  --   --   --  4.9  --  4.3   CHLORIDE  --   --   --  105  --  108*   CO2  --   --   --  26  --  26   BUN  --   --   --  16  --  15   CR  --   --   --  1.08  --  0.97   ANIONGAP  --   --   --  5  --  8   DIONISIO  --   --   --  8.2*  --  8.6   * 365* 374* 375*   < > 89    < > = values in this interval not displayed.   ,   Results for orders placed or performed during the  hospital encounter of 01/30/22   Elbow XR,  2 views, left    Narrative    EXAM: XR ELBOW LT 2 VW  LOCATION: [facility]  DATE/TIME: 1/30/2022 3:00 PM    INDICATION: trauma, pain  COMPARISON: None.      Impression    IMPRESSION: No acute fracture or malalignment. Mild elbow joint degenerative changes. No elbow joint effusion.   Shoulder XR, left,  one vw PORTABLE    Narrative    EXAM: XR SHOULDER LEFT PORT 1 VIEW  LOCATION: [facility]  DATE/TIME: 1/30/2022 3:00 PM    INDICATION: Fall, back pain radiating to left shoulder  COMPARISON: None.      Impression    IMPRESSION: Normal joint spaces and alignment. No fracture.    XR Chest Port 1 View    Narrative    EXAM: XR CHEST PORT 1 VIEW  LOCATION: [facility]  DATE/TIME: 1/30/2022 3:00 PM    INDICATION: pain fall with back pain radiating to left shoulder  COMPARISON: None.      Impression    IMPRESSION: Low lung volumes. Mild left basilar atelectasis versus scarring. The lungs are otherwise clear. No definite pleural effusion. Normal heart size.   CTA Chest Abdomen Pelvis w Contrast    Narrative    EXAM: CTA CHEST ABDOMEN PELVIS W CONTRAST  LOCATION: [facility]  DATE/TIME: 1/30/2022 4:31 PM    INDICATION: Left thoracic pain radiating to the left arm.  COMPARISON: None.  TECHNIQUE: CT angiogram chest abdomen pelvis during arterial phase of injection of IV contrast. 2D and 3D MIP reconstructions were performed by the CT technologist. Dose reduction techniques were used.   CONTRAST: 100ml isovue 370    FINDINGS:   CT ANGIOGRAM CHEST, ABDOMEN, AND PELVIS: Mild atherosclerotic disease thoracic aorta. No evidence for dissection or aneurysm. Moderate atherosclerotic disease abdominal aorta, common iliac arteries, and internal iliac arteries. No evidence for dissection   or aneurysm. The mesenteric vessels and right renal artery are patent without significant narrowing. Absent left  renal artery. No evidence for pulmonary emboli.    LUNGS AND PLEURA: Dependent atelectasis both lower lobes. The lungs otherwise are clear. No infiltrates or pleural effusions.    MEDIASTINUM/AXILLAE: No enlarged mediastinal or hilar lymph nodes.    CORONARY ARTERY CALCIFICATION: Mild.    HEPATOBILIARY: Small hypervascular focus within the medial right hepatic lobe should represent an incidental transient hepatic attenuation difference (JOSÉ).    PANCREAS: Normal.    SPLEEN: Normal.    ADRENAL GLANDS: Normal.    KIDNEYS/BLADDER: Absent left kidney.    BOWEL: Constipation with moderate volume of stool throughout the colon to the level of the rectum. No evidence for obstruction.    LYMPH NODES: Normal.    PELVIC ORGANS: Fluid along the right spermatic cord.    MUSCULOSKELETAL: Mild hypertrophic changes thoracolumbar spine. No fractures.      Impression    IMPRESSION:  1.  No evidence for aortic dissection or pulmonary emboli.  2.  No evidence for acute pulmonary disease.  3.  No findings to account for the patient's left chest and arm pain.  4.  Absent left kidney.  5.  Constipation.     CT Head w/o Contrast    Narrative    EXAM: CT HEAD W/O CONTRAST  LOCATION: [facility]  DATE/TIME: 1/30/2022 10:33 PM    INDICATION: Dizziness, non-specific  COMPARISON: None.  TECHNIQUE: Routine CT Head without IV contrast. Multiplanar reformats. Dose reduction techniques were used.    FINDINGS:  INTRACRANIAL CONTENTS: No intracranial hemorrhage, extraaxial collection, or mass effect.  No CT evidence of acute infarct. Mild presumed chronic small vessel ischemic changes. Normal ventricles and sulci.     VISUALIZED ORBITS/SINUSES/MASTOIDS: No intraorbital abnormality. No paranasal sinus mucosal disease. No middle ear or mastoid effusion.    BONES/SOFT TISSUES: No acute abnormality.      Impression    IMPRESSION:  1.  No acute intracranial process.   MR Cervical Spine w/o Contrast    Narrative    EXAM: MR  CERVICAL SPINE W/O CONTRAST  LOCATION: [facility]  DATE/TIME: 1/31/2022 5:58 AM    INDICATION: Cervical radiculopathy, no red flags  COMPARISON: None.  TECHNIQUE: MRI Cervical Spine without IV contrast.    FINDINGS:   Normal vertebral body heights. There is approximately 2 mm of retrolisthesis at C5 on C6. Otherwise, there is mild straightening of the normal cervical lordosis. Mildly heterogeneous marrow signal pattern. Modic type II endplate changes are noted at   C7-T1. A degenerative Schmorl's node is identified involving superior endplate of T1. No abnormal cord signal. Small right palatine tonsil mucous retention cyst measuring 6 mm (image 5 series 7). No acute extraspinal abnormality.    Craniovertebral junction and C1-C2: Normal.    C2-C3: Disc desiccation. Disc height maintained. No disc herniation. Mild left facet arthropathy. No canal or foraminal stenosis.     C3-C4: Disc desiccation. Minimal disc height loss. Mild disc bulge. Ligamentum flavum thickening. Bilateral facet arthropathy. Mild to moderate central spinal canal stenosis. Mild bilateral neural foraminal stenosis, left greater than right.     C4-C5: Disc desiccation. Disc height maintained. Right foraminal disc osteophyte complex. Bilateral facet arthropathy. Moderate right foraminal stenosis. No left foraminal stenosis. Mild central spinal canal stenosis.     C5-C6: Disc desiccation. Disc height loss. Disc osteophyte complex, eccentric to the right lateral recess. There is effacement of ventral CSF, contouring of the ventral spinal cord (right greater than left) which is displaced posteriorly. There is severe   central spinal canal stenosis, severe right lateral recess stenosis, severe right neural foraminal stenosis and no significant left neural foraminal stenosis.    C6-C7: Disc height loss. Normal disc signal. Broad-based disc osteophyte complex with effacement of ventral CSF and a superimposed large left lateral  recess disc extrusion/protrusion. There is severe left lateral recess/foraminal stenosis. Mild right   foraminal stenosis. Mild bilateral facet arthropathy. Overall mild central spinal canal stenosis.    C7-T1: Disc desiccation. Disc height loss. Broad-based disc osteophyte complex. Mild effacement of the ventral CSF. Mild facet arthropathy, left greater than right. Mild left neural foraminal stenosis. Moderate right neural foraminal stenosis.      Impression    IMPRESSION:  1.  Multilevel degenerative changes of the cervical spine with grade 1 retrolisthesis at C5 on C6.  2.  At C5-C6 there is a disc osteophyte complex, eccentric to the right lateral recess. At this level there is severe central spinal canal stenosis, severe right lateral recess stenosis, severe right neural foraminal stenosis.  3.  At C6-C7 there is a broad-based disc osteophyte complex with a superimposed left lateral recess disc extrusion/protrusion. At this level there is severe left lateral recess/foraminal stenosis, mild central spinal canal stenosis and mild right neural   foraminal stenosis.  4.  At C4-C5 there is a right foraminal disc osteophyte complex. At this level there is moderate right neural foraminal stenosis and mild central spinal canal stenosis.  5.  Additional findings above.   NM Lexiscan stress test     Value    Pharmacologic Protocol Lexiscan    Test Type Pharmacological    Baseline HR 67    Baseline Systolic     Baseline Diastolic BP 76    Last Stress HR 85    Last Stress Systolic     Last Stress Diastolic BP 69    Target     PERCENT HR 85%    ST Deviation Elevation III 0.5mm    Deviation Time V3 -0.4mm    ST Elevation Amount V3 1.0mm    ST Deviation Amount he aVR -0.7mm    Final Resting /66    Final Resting HR 71    Max Treadmill Speed 0.0    Max Treadmill Grade 0.0    Peak Systolic /69    Peak Diastolic /75    Max HR  93    Stress Phase Resting    Stress Resting Pt Position MANUAL  EVENT    Current HR 76    Current /75    Stress Phase Stress    Stage Minute EXE 00:00    Exercise Stage STAGE 2    Current HR 65    Current /76    Stress Phase Stress    Stage Minute EXE 01:00    Exercise Stage STAGE 3    Current HR 67    Current /76    Stress Phase Stress    Stage Minute EXE 02:00    Exercise Stage STAGE 4    Current HR 91    Current /76    Stress Phase Stress    Stage Minute EXE 02:09    Exercise Stage STAGE 4    Current HR 91    Current /64    Stress Phase Stress    Stage Minute EXE 03:00    Exercise Stage STAGE 5    Current HR 87    Current /64    Stress Phase Stress    Stage Minute EXE 03:00    Exercise Stage STAGE 5    Current HR 87    Current /69    Stress Phase Stress    Stage Minute EXE 04:00    Exercise Stage STAGE 6    Current HR 85    Current /69    Stress Phase Recovery    Stage Minute REC 00:00    Exercise Stage Recovery    Current HR 85    Current /69    Stress Phase Recovery    Stage Minute REC 00:23    Exercise Stage Recovery    Current HR 82    Current /69    Stress Phase Recovery    Stage Minute REC 01:00    Exercise Stage Recovery    Current HR 75    Current /69    Stress Phase Recovery    Stage Minute REC 01:11    Exercise Stage Recovery    Current HR 74    Current /75    Stress Phase Recovery    Stage Minute REC 02:00    Exercise Stage Recovery    Current HR 73    Current /75    Stress Phase Recovery    Stage Minute REC 02:36    Exercise Stage Recovery    Current HR 76    Current /75    Stress Phase Recovery    Stage Minute REC 02:59    Exercise Stage Recovery    Current HR 73    Current /75    Stress Phase Recovery    Stage Minute REC 03:07    Exercise Stage Recovery    Current HR 72    Current /66    Stress Phase Recovery    Stage Minute REC 03:59    Exercise Stage Recovery    Current HR 71    Current /66    Stress Phase Recovery    Stage Minute REC 04:01    Exercise Stage  Recovery    Current HR 71    Current /66    Max Predicted HR  59    Rate Pressure Product 12,741.0    Left Ventricular EF 65    Narrative       The nuclear stress test is negative for inducible myocardial ischemia   or infarction.     The left ventricular ejection fraction at stress is 65%.     The patient is at a low risk of future cardiac ischemic events.     There is no prior study for comparison.     Results relayed to the patient's nurse.           Discharge Medications   Current Discharge Medication List      START taking these medications    Details   acetaminophen (TYLENOL) 325 MG tablet Take 2 tablets (650 mg) by mouth every 6 hours as needed for mild pain or other (and adjunct with moderate or severe pain or per patient request)    Associated Diagnoses: Cervical radiculopathy      dexamethasone (DECADRON) 1 MG tablet Take 3 tablets (3 mg) by mouth 2 times daily (with meals) for 3 days  Qty: 18 tablet, Refills: 0    Associated Diagnoses: Cervical radiculopathy      gabapentin (NEURONTIN) 100 MG capsule Take 1 capsule (100 mg) by mouth 3 times daily  Qty: 90 capsule, Refills: 0    Associated Diagnoses: Cervical radiculopathy         CONTINUE these medications which have CHANGED    Details   !! insulin NPH-Regular 70/30 (HUMULIN 70/30;NOVOLIN 70/30) (70-30) 100 UNIT/ML vial Inject 25 Units Subcutaneous daily (with breakfast)    Associated Diagnoses: Type 1 diabetes mellitus without complication (H)      !! insulin NPH-Regular 70/30 (HUMULIN 70/30;NOVOLIN 70/30) (70-30) 100 UNIT/ML vial Inject 30 Units Subcutaneous daily (with dinner)    Associated Diagnoses: Type 1 diabetes mellitus without complication (H)       !! - Potential duplicate medications found. Please discuss with provider.        Allergies   No Known Allergies

## 2022-01-31 NOTE — ED NOTES
"Pt upset about sugars being high, declined to order food states how can I eat if my sugars are high. Medicated pt with 5 units regular per sliding scale and pt states \"that's not going to do anything\" informed pt I would be giving him his nph once it came up from pharmacy which would also help with his sugars.    Pt then declined his stool softener states he \"doesn't take at home and he didn't ask for it, just throw it over there\".  "

## 2022-01-31 NOTE — H&P
[facility]    History and Physical - Hospitalist Service      Assessment and Plan  Active Problems:    History of diabetes mellitus    Pain of left upper arm    Chest pain, unspecified type     61 year old old male with a past medical history of diabetes mellitus insulin-dependent, smoking dependence who presented to the emergency room for the evaluation of left shoulder/arm pain and near syncope.  He was admitted with,    Atypical chest/arm pain  - Etiology is unclear, more of a musculoskeletal with palpation has multiple cardiac risk factors including family history, diabetes, smoking  -Negative CTA chest and abdomen for PE  - Negative initial EKG and troponin for significant acute changes  - Continue to monitor on telemetry  - Check serial troponin  - Nuclear stress test tomorrow  - Start aspirin  - check lipid panel  - Continue morphine as needed    Near syncope  - Etiology is unclear, possible vasovagal as EKG shows sinus bradycardia on ER presentation  - Start IV fluid support  - Check TSH  - Continue to monitor on telemetry  - Check CT head    Diabetes mellitus type 2  - Insulin-dependent  - Cover with insulin sliding scale  - Restart home medication when verified by pharmacy  - Check hemoglobin A1c    History of fall  - Patient has been complaining of shoulder pain for the past 4 days that radiates to left arm  - Still has numbness of left first and second fingers  - Negative x-ray shoulder for fracture  - Check cervical spine MRI    Smoking dependence  - Discussed with patient to quit  - Start nicotine patch      COVID STATUS: Negative date: 1/30/2022    VTE prophylaxis:  Pneumatic Compression Devices  DIET: Orders Placed This Encounter      Moderate Consistent Carb (60 g CHO per Meal) Diet      NPO for Medical/Clinical Reasons Except for: Meds, Ice Chips      Disposition/Barriers to discharge: Stress test, MRI cervical spine  Code Status:Full Code    HPI: Shaq Ny is a 61  "year old old male with a past medical history of diabetes mellitus insulin-dependent, smoking dependence who presented to the emergency room for the evaluation of left shoulder/arm pain and near syncope.  Basically the pain started in his left shoulder/arm about 4 days ago and he was feeling the pain shooting to his left arm with numbness of his first and second fingers.  Pain did improve and was getting better until yesterday where pain was starting to come back and rates 5/10 in severity.Today, patient was driving with his wife when he had an episode of sudden onset of pain. Patient recalls that his vision when white and he felt like passing out. He told his wife \"I'm having a heart attack. Call 911.\" Patient never lost consciousness and remembers his wife calling EMS. In the parking lot before EMS arrival, patient vomited once. Off note the patient was snowblowing yesterday which aggravated his back and left arm. Patient applied heat and took Advil last night.  No history of medication changes.  No history of recent travel.  Patient received morphine in the ER with significant improvement of his pain.  Still has mild numbness over his left first and second fingers.  Patient is fully vaccinated for Covid and received a booster dose recently.      Past Medical History:   Diagnosis Date     Diabetes mellitus (H)     insulin dependent, onset age 30s     Past Surgical History:   Procedure Laterality Date     BLADDER SURGERY      4th grade     KIDNEY SURGERY Left     kidney removed in 4th grade     KIDNEY SURGERY Right     partial removal in 4th grade, no hx of transplants     Family History   Problem Relation Age of Onset     Coronary Artery Disease Mother      Coronary Artery Disease Father      Coronary Artery Disease Brother      Social History     Socioeconomic History     Marital status:      Spouse name: Not on file     Number of children: Not on file     Years of education: Not on file     Highest " education level: Not on file   Occupational History     Not on file   Tobacco Use     Smoking status: Current Every Day Smoker     Packs/day: 1.00     Years: 50.00     Pack years: 50.00     Types: Cigarettes     Smokeless tobacco: Never Used   Substance and Sexual Activity     Alcohol use: Never     Drug use: Never     Sexual activity: Not on file   Other Topics Concern     Not on file   Social History Narrative     Not on file     Social Determinants of Health     Financial Resource Strain: Not on file   Food Insecurity: Not on file   Transportation Needs: Not on file   Physical Activity: Not on file   Stress: Not on file   Social Connections: Not on file   Intimate Partner Violence: Not on file   Housing Stability: Not on file     No Known Allergies    PRIOR TO ADMISSION MEDICATIONS   (Not in a hospital admission)       REVIEW OF SYSTEMS:  12 point reviewed pertinent negatives and positives in HPI all others negative     PHYSICAL EXAM  B/P:114/80 T:97.5 P:63 R: 18     Head:    Normocephalic, without obvious abnormality, atraumatic   Eyes:    PERRL, conjunctiva/corneas clear, EOM's intact,both eyes    Ears:    Normal external ear canals no drainage or erythema bilat.   Nose:   Nares normal by gross inspection,  mucosa normal, no drainage or sinus tenderness   Throat:   Lips, mucosa, and tongue normal; teeth and gums normal   Neck:   Supple, symmetrical, trachea midline, no adenopathy;        thyroid:  No enlargement/tenderness/nodules   Back:     Symmetric, no curvature, ROM normal, no CVA tenderness   Lungs:    Decreased breath sounds on lung bases, no wheeze or rhonchi.   Chest wall:    No tenderness or deformity   Heart:    Regular rate and rhythm, S1 and S2 normal, I/VI systolic murmur, no rubs, no JVD, no edema   Abdomen:     Soft, non-tender, bowel sounds active all four quadrants,     no masses, no hepatosplenomegaly   Musculoskeletal:   Extremities are warm and non-tender, atraumatic, no joint swelling or  tenderness   Pulses:   2+ and symmetric all extremities   Skin:   Skin color, texture, turgor normal, no rashes or lesions on exposed areas, please see nursing assessment for full skin assessment   Neurologic:  Grossly intact, no focal deficit.         PERTINENT LABS and RADIOLOGY   Recent Labs   Lab 01/30/22  1424   WBC 13.5*   HGB 14.8   MCV 94         POTASSIUM 4.3   CHLORIDE 108*   CO2 26   BUN 15   CR 0.97   ANIONGAP 8   DIONISIO 8.6   GLC 89   ALBUMIN 3.6   PROTTOTAL 6.1   BILITOTAL 0.7   ALKPHOS 74   ALT 14   AST 17   LIPASE <9     Recent Results (from the past 24 hour(s))   Shoulder XR, left,  one vw PORTABLE    Narrative    EXAM: XR SHOULDER LEFT PORT 1 VIEW  LOCATION: [facility]  DATE/TIME: 1/30/2022 3:00 PM    INDICATION: Fall, back pain radiating to left shoulder  COMPARISON: None.      Impression    IMPRESSION: Normal joint spaces and alignment. No fracture.    Elbow XR,  2 views, left    Narrative    EXAM: XR ELBOW LT 2 VW  LOCATION: [facility]  DATE/TIME: 1/30/2022 3:00 PM    INDICATION: trauma, pain  COMPARISON: None.      Impression    IMPRESSION: No acute fracture or malalignment. Mild elbow joint degenerative changes. No elbow joint effusion.   XR Chest Port 1 View    Narrative    EXAM: XR CHEST PORT 1 VIEW  LOCATION: [facility]  DATE/TIME: 1/30/2022 3:00 PM    INDICATION: pain fall with back pain radiating to left shoulder  COMPARISON: None.      Impression    IMPRESSION: Low lung volumes. Mild left basilar atelectasis versus scarring. The lungs are otherwise clear. No definite pleural effusion. Normal heart size.   CTA Chest Abdomen Pelvis w Contrast    Narrative    EXAM: CTA CHEST ABDOMEN PELVIS W CONTRAST  LOCATION: [facility]  DATE/TIME: 1/30/2022 4:31 PM    INDICATION: Left thoracic pain radiating to the left arm.  COMPARISON: None.  TECHNIQUE: CT angiogram chest abdomen pelvis during  arterial phase of injection of IV contrast. 2D and 3D MIP reconstructions were performed by the CT technologist. Dose reduction techniques were used.   CONTRAST: 100ml isovue 370    FINDINGS:   CT ANGIOGRAM CHEST, ABDOMEN, AND PELVIS: Mild atherosclerotic disease thoracic aorta. No evidence for dissection or aneurysm. Moderate atherosclerotic disease abdominal aorta, common iliac arteries, and internal iliac arteries. No evidence for dissection   or aneurysm. The mesenteric vessels and right renal artery are patent without significant narrowing. Absent left renal artery. No evidence for pulmonary emboli.    LUNGS AND PLEURA: Dependent atelectasis both lower lobes. The lungs otherwise are clear. No infiltrates or pleural effusions.    MEDIASTINUM/AXILLAE: No enlarged mediastinal or hilar lymph nodes.    CORONARY ARTERY CALCIFICATION: Mild.    HEPATOBILIARY: Small hypervascular focus within the medial right hepatic lobe should represent an incidental transient hepatic attenuation difference (JOSÉ).    PANCREAS: Normal.    SPLEEN: Normal.    ADRENAL GLANDS: Normal.    KIDNEYS/BLADDER: Absent left kidney.    BOWEL: Constipation with moderate volume of stool throughout the colon to the level of the rectum. No evidence for obstruction.    LYMPH NODES: Normal.    PELVIC ORGANS: Fluid along the right spermatic cord.    MUSCULOSKELETAL: Mild hypertrophic changes thoracolumbar spine. No fractures.      Impression    IMPRESSION:  1.  No evidence for aortic dissection or pulmonary emboli.  2.  No evidence for acute pulmonary disease.  3.  No findings to account for the patient's left chest and arm pain.  4.  Absent left kidney.  5.  Constipation.       EKG: Sinus bradycardia, nonspecific T wave changes.    Discussed with patient, family and nursing staff     Ike Rosenbaum MD  [facility] Internal Medicine Hospitalist  [phone]

## 2022-01-31 NOTE — DISCHARGE INSTRUCTIONS
ACTIVITY  *No lifting, pushing or pulling greater than 5-10 pound (this is about a gallon of milk).  *No repetitive bending, twisting, or jarring activities  *No aerobic or strenuous activity  *No activities with increased risk of falls  *You may move about your home as tolerated  *You may walk up and down stairs as tolerated    Call [phone] Neurosurgery with the following symptoms:    *Worsening pain   *Worsening or new onset of weakness, or numbness and tingling  *Loss or change in your ability to control bowel or bladder function    Neurosurgery will also call you to set up follow up appointment. Please call  with questions or if you do not hear from us.

## 2022-02-02 ENCOUNTER — MEDICAL CORRESPONDENCE (OUTPATIENT)
Dept: NEUROSURGERY | Facility: CLINIC | Age: 62
End: 2022-02-02
Payer: COMMERCIAL

## 2022-02-02 ENCOUNTER — TELEPHONE (OUTPATIENT)
Dept: NEUROSURGERY | Facility: CLINIC | Age: 62
End: 2022-02-02
Payer: COMMERCIAL

## 2022-02-02 DIAGNOSIS — M54.12 CERVICAL RADICULOPATHY: ICD-10-CM

## 2022-02-02 DIAGNOSIS — M48.02 SPINAL STENOSIS IN CERVICAL REGION: Primary | ICD-10-CM

## 2022-02-02 NOTE — TELEPHONE ENCOUNTER
Upright XR cervical and appt with Dr Quiroz this week or next week    CHIRAG Jackson-CNP  [facility] Neurosurgery  O: [phone]

## 2022-02-04 ENCOUNTER — OFFICE VISIT (OUTPATIENT)
Dept: NEUROSURGERY | Facility: CLINIC | Age: 62
End: 2022-02-04
Payer: COMMERCIAL

## 2022-02-04 VITALS
DIASTOLIC BLOOD PRESSURE: 74 MMHG | RESPIRATION RATE: 16 BRPM | HEIGHT: 71 IN | SYSTOLIC BLOOD PRESSURE: 112 MMHG | WEIGHT: 170 LBS | BODY MASS INDEX: 23.8 KG/M2 | HEART RATE: 68 BPM

## 2022-02-04 DIAGNOSIS — M48.02 SPINAL STENOSIS IN CERVICAL REGION: Primary | ICD-10-CM

## 2022-02-04 DIAGNOSIS — M54.12 CERVICAL RADICULOPATHY: ICD-10-CM

## 2022-02-04 PROCEDURE — 99204 OFFICE O/P NEW MOD 45 MIN: CPT | Performed by: NEUROLOGICAL SURGERY

## 2022-02-04 ASSESSMENT — MIFFLIN-ST. JEOR: SCORE: 1588.24

## 2022-02-04 NOTE — LETTER
2/4/2022         RE: Shaq Ny  [address] [place] [address]        Dear Colleague,    Thank you for referring your patient, Shaq Ny, to the [facility]. Please see a copy of my visit note below.    Neurosurgery consultation was requested by: [facility] ED  Pain: acute onset of neck pain on 01/30/2022 - was presented to [facility] ED  Radicular Pain is present: left shoulder, deltoid and elbow - sharp shooting at times  Lhermitte sign: denies  Motor complaints: weak /grasp on the left  Sensory complaints: numbness left thumb and pointer finger, burning sensation in right hand  Gait and balance issues: absent  Bowel or bladder issues: denies  Duration of SX is: for 2 weeks  The symptoms are worse with: ROM  The symptoms are better with: rest  Injury: unknown  Severity is: moderate  Patient has tried the following conservative measures: Decadron, Gabapentin  NDI score is : 26%  Yudith Bui RN, CNRN           is a 61-year-old man seen today for evaluation of nearly a month of neck and predominant left upper extremity radicular pain.  He reports that this problem began just over 3 weeks ago following an episode of using a snowblower.  Initially, he experienced severe pain into his left shoulder, left chest and down his left arm with immediate concern for possible cardiac event.  He actually reports a syncopal episode in association with the onset of this pain.  He was taken to [facility] emergency room for further evaluation and the pain was judged to be radicular.  He underwent a cervical MRI scan which showed an acute left C6-7 disc herniation and was subsequently discharged home with a Medrol Dosepak.  He notes approximately 70% symptomatic improvement since the immediate onset of his pain but reports continued numbness into his thumb and index finger associated with some weakness of his nondominant left upper extremity, persistent neck  and arm pain and lifestyle limiting symptoms overall.  He has a history of similar symptoms involving his right upper extremity which was more restricted to his forearm and hand about a year ago.  This improved spontaneously and presently is a minimal complaint.  He denies any specific injury otherwise to his neck or any interventional procedure previously.    He works in [employer] and notes significant occupational compromise as a result of his continuing neck and left arm symptoms which are exacerbated by unusual postures or heavy lifting.  He is noted to be right-hand dominant.    On examination, he is awake alert and oriented x3 with memory intact for recent and remote events and speech clear in character and content.  There are no focal cranial nerve abnormalities identified.  Cervical range of motion is slightly restricted in both flexion and extension and somewhat limited with rotation toward the left side because of symptoms into the left shoulder and proximal arm.  Motor examination of his bilateral upper extremity shows 5/5 strength throughout with the exception of his left triceps which is significantly weak at 4/5.  Additionally he has weakness of his right biceps at 5-/5.  Left biceps also appears to be slightly compromised possibly more so from pain than objective deficit.  Sensory examination is intact with the exception of the left C7 dermatomal pattern as described above.  Deep tendon reflexes however are 1+ and equal at the biceps, triceps and brachioradialis bilaterally.  Patellar tendon and ankle jerks are trace but symmetric.  There is no ankle clonus or other evidence of long track findings.  Gait is normal.    Review of his recent cervical MRI scan shows congenital cervical stenosis throughout with evidence of advanced degenerative disc change at C5-6 with a chronic appearing central and slightly right-sided osteophyte which does produce proximal foraminal stenosis as well significant cervical  spinal canal stenosis.  There is no evidence of intrinsic signal change within the cervical spinal cord at this or any other level however.  At the C6-7 level he is noted to have what appears to be an acute disc herniation into the proximal left C6-7 neural foramen with substantial compression of the exiting left C7 nerve root.  There are minor degrees of cervical spondylitic change noted at C4-5 as well but no compelling evidence of focal neurologic impingement and alignment otherwise is satisfactory.    Assessment: Acute onset of left C7 radiculopathy with objective neurologic deficit involving his nondominant left upper extremity and present for over 3 weeks partially improved with the use of oral steroids.  Patient also has a history of neck and right upper extremity symptoms which may be consistent with a right C6 radiculopathy secondary to a chronic osteophyte and significant spinal canal and foraminal stenosis at right C5-6.    I reviewed the clinical and radiographic findings with him in substantial detail and discussed differential diagnosis, management alternatives, risks and benefits.  I spoke with him regarding possibility of a trial of physical therapy to focus essentially on cervical traction to determine if distraction at the C5-6 and C6-7 foramen might produce additional symptomatic improvement.  I am concerned given his cervical spinal stenosis and objective deficit involving his left arm in particular that he will require operative intervention for decompression of neural elements.  I discussed this with him at some length and I have recommended an anterior cervical discectomy and fusion at the C5-6 level because of the presence of chronic osteophytes and bilateral foraminal stenosis related to loss of disc height at C5-6 primarily.  I have also recommended cervical discectomy at C6-7 with removal of the acute disc herniation eccentric toward the left side and compressing the left C7 nerve root.   This would be followed by placement of a Prestige arthroplasty at C6-7 to maintain motion, particularly important given his occupation.    I reviewed the indications and alternatives to surgery with the patient at some length.  I also told him that the risks of the proposed surgical procedure include failure to improve his symptoms, increased pain weakness or numbness, injury to the nerve roots or cervical spinal cord up to and including permanent quadriplegia, infection, bleeding, injury to cervical structures including but not limited to the trachea, esophagus, great vessels or recurrent laryngeal nerve, misplacement or displacement of cervical instrumentation, failure of fusion, etc.  He appeared to have good understanding at the conclusion of our discussion.    The management plan which we agreed on was a brief trial of physical therapy to focus on cervical traction.  Should he see significant symptomatic improvement including resolution of his numbness and left upper extremity weakness then surgical planning would be deferred or canceled.  If cervical traction and physical therapy are not helpful in ameliorating his current symptoms then I would recommend proceeding with surgical decompression and stabilization as detailed above without undue delay given his subjective sensory and motor deficits.    Approximately 45 minutes in total was spent reviewing the clinical and radiographic findings as well as counseling the patient regarding differential diagnosis and management alternatives, risks and benefits.      Again, thank you for allowing me to participate in the care of your patient.        Sincerely,        Eric Quiroz MD

## 2022-02-04 NOTE — PROGRESS NOTES
Neurosurgery consultation was requested by: [facility] ED  Pain: acute onset of neck pain on 01/30/2022 - was presented to [facility] ED  Radicular Pain is present: left shoulder, deltoid and elbow - sharp shooting at times  Lhermitte sign: denies  Motor complaints: weak /grasp on the left  Sensory complaints: numbness left thumb and pointer finger, burning sensation in right hand  Gait and balance issues: absent  Bowel or bladder issues: denies  Duration of SX is: for 2 weeks  The symptoms are worse with: ROM  The symptoms are better with: rest  Injury: unknown  Severity is: moderate  Patient has tried the following conservative measures: Decadron, Gabapentin  NDI score is : 26%  Yudith Bui RN, CNRN

## 2022-02-04 NOTE — PATIENT INSTRUCTIONS
Please review COMPLETE information about your proposed surgery, pre-operative requirements, post-operative course and expectations - available in a folder provided to you in clinic!    Your surgery scheduler will call you to begin the process of scheduling your surgery and appointments.     Pre-Operative      Pre-operative physical / Lab work with primary care physician within 30 days of surgical date. We will assist you in scheduling this.    o If all pre-op appointments/test are not completed prior to your surgery date, you will be asked to reschedule your surgery.           As part of preparation for your upcoming procedure you are required to have a test for the novel Coronavirus/COVID-19.  o The test needs to be completed within 4 days (96) hours of surgery.   o We will assist you in scheduling this.   o You may NOT receive the COVID-19 vaccine or booster 2 weeks before or after surgery.      Readiness Visits    o Prior to surgery, you may have a telephone or in person readiness visit with our RN team to discuss your upcomming surgery, results of your pre-op physical, and lab work.   o If you will require a collar/neck brace after surgery, you will be fitted for one at your readiness visit prior to surgery (scheduled by the surgery scheduler).       Shower procedure    o Hibiclens shower: Use one packet the night before surgery and one packet the morning of surgery for a whole body shower. Avoid face, hair, and genitals.        Eating/Drinking    o Stop all solid foods 8 hours before surgery.  o Keep drinking clear liquids until 4 hours before surgery  - Clear liquids include water, clear juice, black coffee, or clear tea without milk, Gatorade, clear soda.       Medications - please refer to the pre-operative medication instructions sheet in your folder    o Hold Aspirin, NSAIDs (Advil/Ibuprofen, Indocin, Naproxen,Nuprin,Relafen/Nabumetone, Diclofenac,Meloxicam, Aleve, Celebrex) and all vitamins and  supplements x 7-10 days prior to surgical date  o You can take Tylenol (Acetaminophen) for pain up until the date of your surgery   - Do not exceed 3,000 mg per day   o Any other medications prescribed, please discuss with your primary care provider at your pre-operative physical. Please discuss when/if it is safe for you to stop taking blood thinners with your primary care provider.   o We will NOT provide pain medications prior to surgery. We will prescribe post-op pain medications for up to 6 weeks after surgery.       FMLA/Short-term disability      If you are currently employed, you will likely need to be off work for 4-6 weeks for post-op recovery and healing.    Please fax any FMLA/short term disability paperwork to [phone], mail it into the clinic, drop it off in person, or send via a [employer] message.     You may also call our clinic with the date in which you'd like to return to work, and we can provide a work letter to your employer    We will support Short-Term Disability up to 12 weeks, beginning the date of your surgery. We do NOT support Long-Term Disability/Social Security Benefits.     Pain Management after surgery      Dealing with pain    o As your body heals, you might feel a stabbing, burning, or aching pain. You may also have some numbness.  o Everyone feels pain differently, we may ask you to rate your pain using a pain scale. This will let us know how much pain you feel.   o Keep in mind that medicine won't take away all of your pain. It helps to try other ways to relax and ease pain.       Things to help with pain    o After surgery, we will give you medicine for your pain. These medications work well, but they can make you drowsy, itchy, or sick to your stomach, and constipated. Try to take narcotics with food if they cause nausea.   o For mild to moderate pain, you can take medication such as Tylenol or Ibuprofen. These can be used with narcotics and muscle relaxants. *If you have had  a fusion: do NOT use NSAIDs for 6 months after surgery.   - Do NOT drive while taking narcotic pain medication  - Do NOT drink alcohol while using narcotic pain medication  o You can utilize ice as needed (no longer than 20 minutes at one time) you may apply this over your covered incision  o Utilize heat for muscle spasms, do not apply heat over your incision  o If a muscle relaxer is prescribed, please do NOT take it at the same time as your narcotic pain medication. Take them at least 90 minutes apart.   o You may also use pain cream/patches on sore muscles. Do NOT apply these on your incision. Patches may be cut in 1/2 if needed.     *After your surgery, if you will be staying in-patient, a nursing team will be monitoring you closely throughout your stay and communicate your health status to your surgeon and other providers.  You will be seen by Advanced Practice Providers (e.g., nurse practitioners, clinic nurse specialists, and physician assistants) who will check on you regularly to assess the status of your surgical recovery.     Incision Care      Look at your incision site every day. You  may need a mirror, or family member to help you.     Do not submerge your incision in water such as pools, hot tubs, baths for at least 6 weeks or until incision is healed    You may get your incision wet in the shower. Allow water and soap to run over incision, and gently pat dry.     Remove the dressing the day after you are discharged from the hospital. Keep the incision clean and dry at all times. This may require several bandage changes.     Contact us right away if you have:   o Fever over 101 degrees farenheit  o Green or yellow drainage (pus) from your incision or increased bloody drainage   o Redness, swelling, or warmth at your surgery site   o Notify the clinic [phone].    Activity Restrictions      For the first 6 weeks, no lifting,pushing, or pulling > 5-10 pounds, no bending, twisting.    Use the stairs  in moderation     Walking: Walking is the best way to start exercise after surgery. Take short frequent walks. You may gradually increase the distance as tolerated. If you feel pain, decrease your activity, but DO NOT stop walking. Walking will help you regain strength.    Avoid prolonged positioning for longer than 30 minutes (change positions frequently while awake)    No contact sports until after follow up visit    No high impact activities such as; running/jogging, snowmobile or 4 boucher riding or any other recreational vehicles until deemed safe by your surgeon/care team.     Please call the clinic if you develop any of the following symptoms:  o Swelling and/or warmth in one or both legs  o Pain or tenderness in your leg, ankle, foot, or arm   o Red or discolored/pale skin     Post-Op Follow Up Appointments      We will call you to schedule these appointments after your discharge from the hospital.     Incision assessment within 2 weeks with a Registered Nurse     6 week post-op with a Nurse Practitioner/Physician Assistant. Your surgeon will be available on this day.        WILL HAVE YOU TRY TRACTION FIRST

## 2022-02-04 NOTE — PROGRESS NOTES
is a 61-year-old man seen today for evaluation of nearly a month of neck and predominant left upper extremity radicular pain.  He reports that this problem began just over 3 weeks ago following an episode of using a snowblower.  Initially, he experienced severe pain into his left shoulder, left chest and down his left arm with immediate concern for possible cardiac event.  He actually reports a syncopal episode in association with the onset of this pain.  He was taken to [facility] emergency room for further evaluation and the pain was judged to be radicular.  He underwent a cervical MRI scan which showed an acute left C6-7 disc herniation and was subsequently discharged home with a Medrol Dosepak.  He notes approximately 70% symptomatic improvement since the immediate onset of his pain but reports continued numbness into his thumb and index finger associated with some weakness of his nondominant left upper extremity, persistent neck and arm pain and lifestyle limiting symptoms overall.  He has a history of similar symptoms involving his right upper extremity which was more restricted to his forearm and hand about a year ago.  This improved spontaneously and presently is a minimal complaint.  He denies any specific injury otherwise to his neck or any interventional procedure previously.    He works in [employer] and notes significant occupational compromise as a result of his continuing neck and left arm symptoms which are exacerbated by unusual postures or heavy lifting.  He is noted to be right-hand dominant.    On examination, he is awake alert and oriented x3 with memory intact for recent and remote events and speech clear in character and content.  There are no focal cranial nerve abnormalities identified.  Cervical range of motion is slightly restricted in both flexion and extension and somewhat limited with rotation toward the left side because of symptoms into the left shoulder and proximal arm.   Motor examination of his bilateral upper extremity shows 5/5 strength throughout with the exception of his left triceps which is significantly weak at 4/5.  Additionally he has weakness of his right biceps at 5-/5.  Left biceps also appears to be slightly compromised possibly more so from pain than objective deficit.  Sensory examination is intact with the exception of the left C7 dermatomal pattern as described above.  Deep tendon reflexes however are 1+ and equal at the biceps, triceps and brachioradialis bilaterally.  Patellar tendon and ankle jerks are trace but symmetric.  There is no ankle clonus or other evidence of long track findings.  Gait is normal.    Review of his recent cervical MRI scan shows congenital cervical stenosis throughout with evidence of advanced degenerative disc change at C5-6 with a chronic appearing central and slightly right-sided osteophyte which does produce proximal foraminal stenosis as well significant cervical spinal canal stenosis.  There is no evidence of intrinsic signal change within the cervical spinal cord at this or any other level however.  At the C6-7 level he is noted to have what appears to be an acute disc herniation into the proximal left C6-7 neural foramen with substantial compression of the exiting left C7 nerve root.  There are minor degrees of cervical spondylitic change noted at C4-5 as well but no compelling evidence of focal neurologic impingement and alignment otherwise is satisfactory.    Assessment: Acute onset of left C7 radiculopathy with objective neurologic deficit involving his nondominant left upper extremity and present for over 3 weeks partially improved with the use of oral steroids.  Patient also has a history of neck and right upper extremity symptoms which may be consistent with a right C6 radiculopathy secondary to a chronic osteophyte and significant spinal canal and foraminal stenosis at right C5-6.    I reviewed the clinical and  radiographic findings with him in substantial detail and discussed differential diagnosis, management alternatives, risks and benefits.  I spoke with him regarding possibility of a trial of physical therapy to focus essentially on cervical traction to determine if distraction at the C5-6 and C6-7 foramen might produce additional symptomatic improvement.  I am concerned given his cervical spinal stenosis and objective deficit involving his left arm in particular that he will require operative intervention for decompression of neural elements.  I discussed this with him at some length and I have recommended an anterior cervical discectomy and fusion at the C5-6 level because of the presence of chronic osteophytes and bilateral foraminal stenosis related to loss of disc height at C5-6 primarily.  I have also recommended cervical discectomy at C6-7 with removal of the acute disc herniation eccentric toward the left side and compressing the left C7 nerve root.  This would be followed by placement of a Prestige arthroplasty at C6-7 to maintain motion, particularly important given his occupation.    I reviewed the indications and alternatives to surgery with the patient at some length.  I also told him that the risks of the proposed surgical procedure include failure to improve his symptoms, increased pain weakness or numbness, injury to the nerve roots or cervical spinal cord up to and including permanent quadriplegia, infection, bleeding, injury to cervical structures including but not limited to the trachea, esophagus, great vessels or recurrent laryngeal nerve, misplacement or displacement of cervical instrumentation, failure of fusion, etc.  He appeared to have good understanding at the conclusion of our discussion.    The management plan which we agreed on was a brief trial of physical therapy to focus on cervical traction.  Should he see significant symptomatic improvement including resolution of his numbness and  left upper extremity weakness then surgical planning would be deferred or canceled.  If cervical traction and physical therapy are not helpful in ameliorating his current symptoms then I would recommend proceeding with surgical decompression and stabilization as detailed above without undue delay given his subjective sensory and motor deficits.    Approximately 45 minutes in total was spent reviewing the clinical and radiographic findings as well as counseling the patient regarding differential diagnosis and management alternatives, risks and benefits.

## 2022-02-09 RX ORDER — DEXAMETHASONE SODIUM PHOSPHATE 10 MG/ML
4 INJECTION, SOLUTION INTRAMUSCULAR; INTRAVENOUS ONCE
Status: CANCELLED | OUTPATIENT
Start: 2022-02-09 | End: 2022-02-09

## 2022-02-10 ENCOUNTER — THERAPY VISIT (OUTPATIENT)
Dept: PHYSICAL THERAPY | Facility: CLINIC | Age: 62
End: 2022-02-10
Attending: INTERNAL MEDICINE
Payer: COMMERCIAL

## 2022-02-10 DIAGNOSIS — M54.12 CERVICAL RADICULOPATHY: ICD-10-CM

## 2022-02-10 DIAGNOSIS — M48.02 SPINAL STENOSIS IN CERVICAL REGION: ICD-10-CM

## 2022-02-10 PROCEDURE — 97140 MANUAL THERAPY 1/> REGIONS: CPT | Mod: GP | Performed by: PHYSICAL THERAPIST

## 2022-02-10 PROCEDURE — 97110 THERAPEUTIC EXERCISES: CPT | Mod: GP | Performed by: PHYSICAL THERAPIST

## 2022-02-10 PROCEDURE — 97162 PT EVAL MOD COMPLEX 30 MIN: CPT | Mod: GP | Performed by: PHYSICAL THERAPIST

## 2022-02-10 NOTE — PROGRESS NOTES
Physical Therapy Initial Evaluation  Subjective:    Therapist Generated HPI Evaluation  Problem details: Pt presents to PT with a chief complaint of L sided cervical and UE pain with reported onset ~2 weeks ago after snowblowing. Pt reports intense pain into his L shoulder and arm which led to and ED visit with suspicion of a cardiac arrest. ED evaluation revealed acute L sided cervical radiculopathy with disc herniation and patient sent to Neurosurgery. MRI demonstarted at the C6-7 level he is noted to have what appears to be an acute disc herniation into the proximal left C6-7 neural foramen with substantial compression of the exiting left C7 nerve root. Pt reports significant pain and L arm weakness with no improvement after Medrol Dose pack. Pt referred to PT for cervical traction with plans for surgical intervention if sxs fail to improve.         Type of problem:  Cervical spine.    This is a new condition.  Condition occurred with:  Repetition/overuse.  Where condition occurred: at home.  Patient reports pain:  Lower cervical spine and cervical left side.  Pain is described as burning, sharp and shooting and is constant.  Pain radiates to:  Shoulder left, upper arm left, lower arm left and hand left. Pain is worse in the P.M. and worse during the night.  Since onset symptoms are unchanged.  Associated symptoms:  Loss of motion/stiffness, loss of strength, numbness and tingling. Symptoms are exacerbated by lifting, rotating head, looking up or down, driving, change of position and lying down  and relieved by NSAID's.  Special tests included:  MRI and x-ray.    Restrictions due to condition include:  Currently not working due to present treatment.  Barriers include:  None as reported by patient.    Patient Health History         Pain is reported as 9/10 on pain scale.  General health as reported by patient is good.  Pertinent medical history includes: diabetes.   Red flags:  None as reported by  patient.  Medical allergies: none.   Surgeries include:  None.        Occupation: HVAC tech.   Primary job tasks include:  Driving, operating a machine/assembly, lifting/carrying and pushing/pulling.                                    Objective:  Standing Alignment:    Cervical/thoracic deviations alignment: Increased cervical flexion and R side bending in sitting.                                    Cervical/Thoracic Evaluation    AROM:  AROM Cervical:    Flexion:          Mod loss, pain ++  Extension:       Major loss, pain ++  Rotation:         Left: Major loss     Right: Mod loss  Side Bend:      Left:     Right:         Cervical Myotomes:            C6 (Biceps):  Left: 5    Right: 5-  C7 (Triceps):  Left: 4-    Right: 5  C8 (Thumb Ext): Left: 5    Right: 5  T1 (Intrinsics): Left: 5    Right: 5    Neural Tension:    Left side:  Median positive.    Cervical Dermatomes:                C7 left:  Hypo-light touch           Cervical Palpation:    Tenderness present at Left:    Rhomboids; Upper Trap and Levator    Functional Tests:  Core strength and proprioception spine wnl: Unable to tolerate cervical traction force due to pain ++. Mild cervical relief with gentle traction, but no change in radicular sxs.      Spinal Segmental Conclusions:    Level:  Hypo at C6 and C5                                                General     ROS    Assessment/Plan:    Patient is a 61 year old male with cervical complaints.    Patient has the following significant findings with corresponding treatment plan.                Diagnosis 1:  Cervical radiculopathy  Pain -  mechanical traction, manual therapy, splint/taping/bracing/orthotics, self management, education, directional preference exercise and home program  Decreased ROM/flexibility - manual therapy and therapeutic exercise  Decreased joint mobility - manual therapy and therapeutic exercise  Decreased strength - therapeutic exercise and therapeutic activities  Impaired muscle  performance - neuro re-education  Impaired posture - neuro re-education    Therapy Evaluation Codes:   1) History comprised of:   Personal factors that impact the plan of care:      Overall behavior pattern and Profession.    Comorbidity factors that impact the plan of care are:      Diabetes.     Medications impacting care: None.  2) Examination of Body Systems comprised of:   Body structures and functions that impact the plan of care:      Cervical spine and Shoulder.   Activity limitations that impact the plan of care are:      Bathing, Cooking, Driving, Dressing, Lifting, Throwing, Sleeping and Laying down.  3) Clinical presentation characteristics are:   Evolving/Changing.  4) Decision-Making    Moderate complexity using standardized patient assessment instrument and/or measureable assessment of functional outcome.  Cumulative Therapy Evaluation is: Moderate complexity.    Previous and current functional limitations:  (See Goal Flow Sheet for this information)    Short term and Long term goals: (See Goal Flow Sheet for this information)     Communication ability:  Patient appears to be able to clearly communicate and understand verbal and written communication and follow directions correctly.  Treatment Explanation - The following has been discussed with the patient:   RX ordered/plan of care  Anticipated outcomes  Possible risks and side effects  This patient would benefit from PT intervention to resume normal activities.   Rehab potential is fair.    Frequency:  2 X week, once daily  Duration:  for 3 weeks  Discharge Plan:  Achieve all LTG.  Independent in home treatment program.  Reach maximal therapeutic benefit.    Please refer to the daily flowsheet for treatment today, total treatment time and time spent performing 1:1 timed codes.

## 2022-02-11 ENCOUNTER — TELEPHONE (OUTPATIENT)
Dept: NEUROSURGERY | Facility: CLINIC | Age: 62
End: 2022-02-11
Payer: COMMERCIAL

## 2022-02-11 ENCOUNTER — HOSPITAL ENCOUNTER (OUTPATIENT)
Facility: CLINIC | Age: 62
End: 2022-02-11
Attending: NEUROLOGICAL SURGERY | Admitting: NEUROLOGICAL SURGERY
Payer: COMMERCIAL

## 2022-02-11 NOTE — PROGRESS NOTES
[facility]    OUTPATIENT Physical Therapy ORTHOPEDIC EVALUATION  PLAN OF TREATMENT FOR OUTPATIENT REHABILITATION  (COMPLETE FOR INITIAL CLAIMS ONLY)  Patient's Last Name, First Name, M.I.  YOB: 1960  Shaq Ny    Provider s Name:  [facility]   Medical Record No.  [id number]   Start of Care Date:  02/10/22   Onset Date:   01/30/22   Type:     _X__PT   ___OT Medical Diagnosis:    Encounter Diagnoses   Name Primary?    Cervical radiculopathy     Spinal stenosis in cervical region         Treatment Diagnosis:  L cervical radiculopathy         Goals:     02/10/22 0500   Body Part   Goals listed below are for L Cervical Radiculopathy   Goal #1   Goal #1 sitting   Previous Functional Level No restrictions   Current Functional Level Minutes patient can sit   Performance level <10 min, pain 8/10 into L arm   STG Target Performance Minutes patient will be able to sit   Performance level >30 min pain 5/10 or less   Rationale for personal hygiene;to allow rest from standing   Due date 03/04/22   LTG Target Performance Minutes patient will be able to sit   Performance Level >45 min painfree   Rationale for personal hygiene;to allow rest from standing;for community transportation   Due date 04/08/22       Therapy Frequency:  2x week  Predicted Duration of Therapy Intervention:  4 weeks    Albert Jones, PT                 I CERTIFY THE NEED FOR THESE SERVICES FURNISHED UNDER        THIS PLAN OF TREATMENT AND WHILE UNDER MY CARE     (Physician attestation of this document indicates review and certification of the therapy plan).                       Certification Date From:  02/10/22   Certification Date To:  04/21/22    Referring Provider:  Ike Rosenbaum    Initial Assessment        See Epic Evaluation SOC Date: 02/10/22

## 2022-02-11 NOTE — TELEPHONE ENCOUNTER
Patient called me this morning wondering about a surgery date. I advised him I will connect with his surgery scheduler over at [facility] to ask for an update and then call him back.

## 2022-02-11 NOTE — LETTER
"Dear Mr. Ny,    This letter will help in preparation for your upcoming surgery. Please contact us with any additional questions you may have regarding your surgery. Contact information for your surgery scheduler:   Richie Metcalf, and Isaias [phone] ~ Gregory    You are scheduled for: Cervical Fusion   With: Eric Quiroz M.D.  Date/Time: Wednesday, March 9, 2022 12:25 p.m. (time subject to change)  Location: [place] [address]    Park in the Skyway Ramp and check in at the Skyway Level. They will direct you to the surgery waiting area. Please arrive at 10:25 a.m.     In the event of an emergency surgery case, there may be an adjustment to your start time for surgery.     PREPARING FOR YOUR SURGERY    *Pre-op Physical: 03/01/2022 at 9:40 a.m. with Dr. Jason at [address] [address]    *Please discuss the necessity of receiving a pneumococcal vaccine prior to surgery at your pre-op physical. Recommended for all patients over the age of 65 or based on certain medical conditions.     *After the pre-op physical is complete, please have your clinic fax the visit note to your surgery scheduler at [phone].    *Pre-op Lab Work: Determined at your pre-op physical with Dr. Jason.      *Covid-19 Pre-procedure Test: 03/05/2022 at 9:50 a.m. at [facility]. [address] [place] [id number]. Once you arrive, drive around to the northwest side of the building, and follow the gold \"COVID Testing\" signs. Park in one of the reserved parking spots and enter the building.    *Collar/Brace: You will be fitted for the collar/brace at your Readiness Visit with our office.     Please bring collar/brace with you the day of surgery.    *Readiness Visit: 03/07/2022 at 1:00 p.m., at [facility]. [address] [place] [address] " [phone]    *Ensure that you have completed your pre-op physical, along with any other necessary tests/appointments (listed above), prior to your Readiness Visit.                         ADDITIONAL INFORMATION REGARDING YOUR SURGERY    Medications    Bring a list ALL of your medications, including any over-the-counter vitamins and herbal supplements to your Readiness Visit, and on the day of surgery.    DO NOT bring your medications with you the day of surgery.    Please see attached third sheet for more details on medications/vitamins/herbal supplements that should be discontinued prior to your surgery date.     If you are unsure if you should discontinue a medication/ vitamin/herbal supplement, please call our office and discuss with a nurse.    Continue taking your medications/vitamins/herbal supplements unless they are on the attached list.     Failure to follow the instructions regarding medications/vitamins/herbal supplements will result in cancellation of your surgery.    Day BEFORE Surgery    DO NOT shave near your surgical site. This can cause irritation of the skin    Using a washcloth and provided bottle of Hibiclens, shower the night BEFORE surgery, using a half bottle of Hibiclens to wash your body, avoiding face and genitals. The morning OF surgery, shower and use the second half of the bottle to wash your body, avoiding face and genitals. If you are unable to take a shower the morning of surgery, please discuss your options with the nurse at your readiness visit.     NOTHING  to eat after 11:00 p.m. the night prior to your procedure    CLEAR LIQUIDS: May have the following liquids up to two (2) hours before your arrival time at the hospital: water, plain black coffee (no cream or milk), plain black tea or plain green tea (no cream or milk), Gatorade or Propel Water.    SMOKING: Stop smoking as far before surgery as possible, or as directed by your surgeon. NO tobacco products of any kind (cigarettes,  e-cigarettes, chewing tobacco) beginning at 11:00 p.m. the night prior to your procedure.     ALCOHOL: You should stop drinking alcohol beginning at 11:00 p.m. the night prior to your procedure    Contact our office if you have symptoms of illness such as a fever of 101 or greater, chills, cough, sore throat, or if you develop a rash or any open sore    Day OF Surgery    If you ve been instructed to take a medication(s) on the morning of surgery, please take with a very small sip of water.    Wear loose & comfortable clothing and flat shoes, Leave jewelry/valuables at home. If you wear contact lenses, remove them at home and wear glasses. Remove any body piercings. Remove nail polish.     Planning for Discharge    Start planning for your care after discharge as soon as you receive this letter.    If you have not made arrangements for someone to take you home and stay with you for the first 48 hours after discharge, your surgery may be cancelled.                        PRE-OPERATIVE MEDICATION INSTRUCTIONS    Review this information with your primary care physician prior to discontinuing any of the medications listed below.  Notify your primary care physician that you have been instructed to discontinue these medications.    TEN (10) Days Prior to Surgery, STOP the Following Medications   Vandana-[place]  Anacin  Aspirin  Excedrin  Pepto Bismol    **Before taking ANY over-the-counter medications, check the label for Aspirin   Non-steroidal   Anti-inflammatory Medications (NSAIDS)    Celebrex  Diclofenac (Cataflam)  Etodolac (Iodine)  Fenoprofen (Nalfon)  Ibuprofen (Advil, Motrin, Nuprin)  Indomethacin (Indocin)  Ketoprofen  Ketorolac (Toradol)  Meloxicam (Mobic)  Naproxen (AnaProx, Aleve, Naprosyn)  Relafen (Nabumetone)   Herbal Supplements (this is a partial list of herbals to be discontinued)    Mago Rand  Ephedra  Feverfew  Fish Oil  Flaxseed Oil  Garlic  Yanet  Gingko  Ginseng  Goldenseal  Imitrex  (Sumatriptan)  Kava  Krill Oil  Licorice  Multi Vitamins  [place] Wort  Valerian  Vitamin E  Yohimbe   CHECK WITH YOUR PRESCRIBING DOCTOR BEFORE STOPPING ANY BLOOD THINNERS (approximately 7 days prior to surgery)  (Coumadin, Plavix, Platel, Aggrenox, Effient (Prasugrel), Ticlid), Xarelto, and Pradaxa      ALWAYS CHECK WITH YOUR PRESCRIBING DOCTOR REGARDING THE MEDICATIONS LISTED BELOW; RECOMMENDED STOP TIME IS ALSO LISTED      If you are taking Lovenox, discontinue 24 HOURS prior to surgery    If you are taking weight loss medication, discontinue 7 days prior to surgery    If you are taking Metformin or Simvastatin, check with your primary care physician (or whoever has prescribed you this medication) regarding when to discontinue prior to surgery

## 2022-02-11 NOTE — LETTER
"Dear Mr. Ny,    This letter will help in preparation for your upcoming surgery. Please contact us with any additional questions you may have regarding your surgery. Contact information for your surgery scheduler:   Richie Metcalf, and Isaias [phone] ~ [place]    You are scheduled for: Cervical Fusion   With: Eric Quiroz M.D.  Date/Time: Wednesday, March 9, 2022 12:25 p.m. (time subject to change)  Location: [place] [address]    Check in at the Welcome Desk inside the main doors of the hospital. They will direct you to the surgery waiting area. Please arrive at 10:25 a.m.     In the event of an emergency surgery case, there may be an adjustment to your start time for surgery.     PREPARING FOR YOUR SURGERY    *Pre-op Physical: 03/01/2022 at 9:40 a.m. with Dr. Jason at [address] [address]    *Please discuss the necessity of receiving a pneumococcal vaccine prior to surgery at your pre-op physical. Recommended for all patients over the age of 65 or based on certain medical conditions.     *After the pre-op physical is complete, please have your clinic fax the visit note to your surgery scheduler at [phone].    *Pre-op Lab Work: Determined at your pre-op physical with Dr. Jason.      *Covid-19 Pre-procedure Test: 03/05/2022 at 9:50 a.m. at [facility]. [address] [place] [id number]. Once you arrive, drive around to the northwest side of the building, and follow the gold \"COVID Testing\" signs. Park in one of the reserved parking spots and enter the building.    *Collar/Brace: You will be fitted for the collar/brace at your Readiness Visit with our office.     Please bring collar/brace with you the day of surgery.    *Readiness Visit: 03/07/2022 at 1:00 p.m., at [facility]. [address] [place] [address] " [phone]    *Ensure that you have completed your pre-op physical, along with any other necessary tests/appointments (listed above), prior to your Readiness Visit.                         ADDITIONAL INFORMATION REGARDING YOUR SURGERY    Medications    Bring a list ALL of your medications, including any over-the-counter vitamins and herbal supplements to your Readiness Visit, and on the day of surgery.    DO NOT bring your medications with you the day of surgery.    Please see attached third sheet for more details on medications/vitamins/herbal supplements that should be discontinued prior to your surgery date.     If you are unsure if you should discontinue a medication/ vitamin/herbal supplement, please call our office and discuss with a nurse.    Continue taking your medications/vitamins/herbal supplements unless they are on the attached list.     Failure to follow the instructions regarding medications/vitamins/herbal supplements will result in cancellation of your surgery.    Day BEFORE Surgery    DO NOT shave near your surgical site. This can cause irritation of the skin    Using a washcloth and provided bottle of Hibiclens, shower the night BEFORE surgery, using a half bottle of Hibiclens to wash your body, avoiding face and genitals. The morning OF surgery, shower and use the second half of the bottle to wash your body, avoiding face and genitals. If you are unable to take a shower the morning of surgery, please discuss your options with the nurse at your readiness visit.     NOTHING  to eat after 11:00 p.m. the night prior to your procedure    CLEAR LIQUIDS: May have the following liquids up to two (2) hours before your arrival time at the hospital: water, plain black coffee (no cream or milk), plain black tea or plain green tea (no cream or milk), Gatorade or Propel Water.    SMOKING: Stop smoking as far before surgery as possible, or as directed by your surgeon. NO tobacco products of any kind (cigarettes,  e-cigarettes, chewing tobacco) beginning at 11:00 p.m. the night prior to your procedure.     ALCOHOL: You should stop drinking alcohol beginning at 11:00 p.m. the night prior to your procedure    Contact our office if you have symptoms of illness such as a fever of 101 or greater, chills, cough, sore throat, or if you develop a rash or any open sore    Day OF Surgery    If you ve been instructed to take a medication(s) on the morning of surgery, please take with a very small sip of water.    Wear loose & comfortable clothing and flat shoes, Leave jewelry/valuables at home. If you wear contact lenses, remove them at home and wear glasses. Remove any body piercings. Remove nail polish.     Planning for Discharge    Start planning for your care after discharge as soon as you receive this letter.    If you have not made arrangements for someone to take you home and stay with you for the first 48 hours after discharge, your surgery may be cancelled.                        PRE-OPERATIVE MEDICATION INSTRUCTIONS    Review this information with your primary care physician prior to discontinuing any of the medications listed below.  Notify your primary care physician that you have been instructed to discontinue these medications.    TEN (10) Days Prior to Surgery, STOP the Following Medications   Vandana[place]  Anacin  Aspirin  Excedrin  Pepto Bismol    **Before taking ANY over-the-counter medications, check the label for Aspirin   Non-steroidal   Anti-inflammatory Medications (NSAIDS)    Celebrex  Diclofenac (Cataflam)  Etodolac (Iodine)  Fenoprofen (Nalfon)  Ibuprofen (Advil, Motrin, Nuprin)  Indomethacin (Indocin)  Ketoprofen  Ketorolac (Toradol)  Meloxicam (Mobic)  Naproxen (AnaProx, Aleve, Naprosyn)  Relafen (Nabumetone)   Herbal Supplements (this is a partial list of herbals to be discontinued)    Mago Rand  Ephedra  Feverfew  Fish Oil  Flaxseed Oil  Garlic  Yanet  Gingko  Ginseng  Goldenseal  Imitrex  (Sumatriptan)  Kava  Krill Oil  Licorice  Multi Vitamins  [place] Wort  Valerian  Vitamin E  Yohimbe   CHECK WITH YOUR PRESCRIBING DOCTOR BEFORE STOPPING ANY BLOOD THINNERS (approximately 7 days prior to surgery)  (Coumadin, Plavix, Platel, Aggrenox, Effient (Prasugrel), Ticlid), Xarelto, and Pradaxa      ALWAYS CHECK WITH YOUR PRESCRIBING DOCTOR REGARDING THE MEDICATIONS LISTED BELOW; RECOMMENDED STOP TIME IS ALSO LISTED      If you are taking Lovenox, discontinue 24 HOURS prior to surgery    If you are taking weight loss medication, discontinue 7 days prior to surgery    If you are taking Metformin or Simvastatin, check with your primary care physician (or whoever has prescribed you this medication) regarding when to discontinue prior to surgery

## 2022-02-15 ENCOUNTER — TELEPHONE (OUTPATIENT)
Dept: NEUROSURGERY | Facility: CLINIC | Age: 62
End: 2022-02-15
Payer: COMMERCIAL

## 2022-02-15 DIAGNOSIS — Z01.818 PRE-OP TESTING: Primary | ICD-10-CM

## 2022-02-15 NOTE — TELEPHONE ENCOUNTER
Patient would like a cb from surgery scheduler. She stated she would call pt in 5 minutes. He seemed satisfied when I relayed that information.

## 2022-02-16 ENCOUNTER — TELEPHONE (OUTPATIENT)
Dept: NEUROSURGERY | Facility: CLINIC | Age: 62
End: 2022-02-16
Payer: COMMERCIAL

## 2022-02-16 NOTE — LETTER
Dear  Yanely,    This letter will help in preparation for your upcoming surgery. Please contact us with any additional questions you may have regarding your surgery. Contact information for your surgery scheduler:   Richie Metcalf, and Isaias [phone] ~ [place]    You are scheduled for: Cervical Fusion   With: Eric Quiroz M.D.  Date/Time: Wednesday, April 13, 2022 at 1:35pm (time subject to change)  Location: [place] [address]    Check in at the Welcome Desk inside the main doors of the hospital. They will direct you to the surgery waiting area. Please arrive at 11:35am     In the event of an emergency surgery case, there may be an adjustment to your start time for surgery.     PREPARING FOR YOUR SURGERY    *Pre-op Physical: 3/31/22 at 10:20am with Dr. Jason at [address] [address]    *Please discuss the necessity of receiving a pneumococcal vaccine prior to surgery at your pre-op physical. Recommended for all patients over the age of 65 or based on certain medical conditions.     *After the pre-op physical is complete, please have your clinic fax the visit note to your surgery scheduler at [phone].    *Pre-op Lab Work: You can discuss with Dr. Jason at your pre-op appointment.     *Covid-19 Pre-procedure Test: 4/9/2022 at 10:00am at [facility]. [address] [place] [address].    *Collar/Brace: You will be fitted for the collar/brace at your Readiness Visit with our office.     Please bring collar/brace with you the day of surgery.    *Readiness Visit: 4/11/2022 with an arrival time of 10:45am at [facility] [address]    *Ensure that you have completed your pre-op physical, along with any other necessary tests/appointments (listed above), prior to your Readiness Visit.                              ADDITIONAL INFORMATION REGARDING YOUR SURGERY    Medications    Bring a list ALL of your medications, including any over-the-counter vitamins and herbal supplements to your Readiness Visit, and on the day of surgery.    DO NOT bring your medications with you the day of surgery.    Please see attached third sheet for more details on medications/vitamins/herbal supplements that should be discontinued prior to your surgery date.     If you are unsure if you should discontinue a medication/ vitamin/herbal supplement, please call our office and discuss with a nurse.    Continue taking your medications/vitamins/herbal supplements unless they are on the attached list.     Failure to follow the instructions regarding medications/vitamins/herbal supplements will result in cancellation of your surgery.    Day BEFORE Surgery    DO NOT shave near your surgical site. This can cause irritation of the skin    Using a washcloth and provided bottle of Hibiclens, shower the night BEFORE surgery, using a half bottle of Hibiclens to wash your body, avoiding face and genitals. The morning OF surgery, shower and use the second half of the bottle to wash your body, avoiding face and genitals. If you are unable to take a shower the morning of surgery, please discuss your options with the nurse at your readiness visit.     NOTHING  to eat after 11:00 p.m. the night prior to your procedure    CLEAR LIQUIDS: May have the following liquids up to two (2) hours before your arrival time at the hospital: water, plain black coffee (no cream or milk), plain black tea or plain green tea (no cream or milk), Gatorade or Propel Water.    SMOKING: Stop smoking as far before surgery as possible, or as directed by your surgeon. NO tobacco products of any kind (cigarettes, e-cigarettes, chewing tobacco) beginning at 11:00 p.m. the night prior to your procedure.     ALCOHOL: You should stop drinking alcohol beginning at 11:00 p.m.  the night prior to your procedure    Contact our office if you have symptoms of illness such as a fever of 101 or greater, chills, cough, sore throat, or if you develop a rash or any open sore    Day OF Surgery    If you ve been instructed to take a medication(s) on the morning of surgery, please take with a very small sip of water.    Wear loose & comfortable clothing and flat shoes, Leave jewelry/valuables at home. If you wear contact lenses, remove them at home and wear glasses. Remove any body piercings. Remove nail polish.     Planning for Discharge    Start planning for your care after discharge as soon as you receive this letter.    If you have not made arrangements for someone to take you home and stay with you for the first 48 hours after discharge, your surgery may be cancelled.                        PRE-OPERATIVE MEDICATION INSTRUCTIONS    Review this information with your primary care physician prior to discontinuing any of the medications listed below.  Notify your primary care physician that you have been instructed to discontinue these medications.    TEN (10) Days Prior to Surgery, STOP the Following Medications   Vandana-[place]  Anacin  Aspirin  Excedrin  Pepto Bismol    **Before taking ANY over-the-counter medications, check the label for Aspirin   Non-steroidal   Anti-inflammatory Medications (NSAIDS)    Celebrex  Diclofenac (Cataflam)  Etodolac (Iodine)  Fenoprofen (Nalfon)  Ibuprofen (Advil, Motrin, Nuprin)  Indomethacin (Indocin)  Ketoprofen  Ketorolac (Toradol)  Meloxicam (Mobic)  Naproxen (AnaProx, Aleve, Naprosyn)  Relafen (Nabumetone)   Herbal Supplements (this is a partial list of herbals to be discontinued)    Mago Rand  Ephedra  Feverfew  Fish Oil  Flaxseed Oil  Garlic  Yanet  Gingko  Ginseng  Goldenseal  Imitrex (Sumatriptan)  Kava  Krill Oil  Licorice  Multi Vitamins  [place] Wort  Valerian  Vitamin E  Yohimbe   CHECK WITH YOUR PRESCRIBING DOCTOR BEFORE STOPPING ANY BLOOD  THINNERS (approximately 7 days prior to surgery)  (Coumadin, Plavix, Platel, Aggrenox, Effient (Prasugrel), Ticlid), Xarelto, and Pradaxa      ALWAYS CHECK WITH YOUR PRESCRIBING DOCTOR REGARDING THE MEDICATIONS LISTED BELOW; RECOMMENDED STOP TIME IS ALSO LISTED      If you are taking Lovenox, discontinue 24 HOURS prior to surgery    If you are taking weight loss medication, discontinue 7 days prior to surgery    If you are taking Metformin or Simvastatin, check with your primary care physician (or whoever has prescribed you this medication) regarding when to discontinue prior to surgery

## 2022-02-16 NOTE — TELEPHONE ENCOUNTER
ORDER FROM: Dr. Quiroz     PRE AUTHORIZATION: [place] Pending    METHOD OF PATIENT CONTACT: Spoke to patient over the phone, best number to contact patient #[phone]    PROCEDURE: Anterior cervical discectomy at cervical 5-6 and cervical 6-7; instrumented fusion at cervical 5-6; cervical arthroplasty at cervical 6-7     SURGICAL DATE: 4/13/2022 at 1:35pm    COVID TEST: 4/9/22 at 10am    READINESS VISIT: 4/11/22 at 11am    PCP, CLINIC, PHONE#: Dr. Jason @ [facility] #[phone]. Pre-op physical 3/31/22 at 10:20am     FILM INFO: MRI Cervical 1/31/22 at [facility]     SURGICAL LETTER: Mailed 2/17/2022

## 2022-02-18 ENCOUNTER — THERAPY VISIT (OUTPATIENT)
Dept: PHYSICAL THERAPY | Facility: CLINIC | Age: 62
End: 2022-02-18
Payer: COMMERCIAL

## 2022-02-18 DIAGNOSIS — M48.02 SPINAL STENOSIS IN CERVICAL REGION: ICD-10-CM

## 2022-02-18 DIAGNOSIS — M54.12 CERVICAL RADICULOPATHY: Primary | ICD-10-CM

## 2022-02-18 PROCEDURE — 97140 MANUAL THERAPY 1/> REGIONS: CPT | Mod: GP | Performed by: PHYSICAL THERAPIST

## 2022-02-18 PROCEDURE — 97110 THERAPEUTIC EXERCISES: CPT | Mod: GP | Performed by: PHYSICAL THERAPIST

## 2022-02-18 PROCEDURE — 97112 NEUROMUSCULAR REEDUCATION: CPT | Mod: GP | Performed by: PHYSICAL THERAPIST

## 2022-02-21 ENCOUNTER — TELEPHONE (OUTPATIENT)
Dept: NEUROSURGERY | Facility: CLINIC | Age: 62
End: 2022-02-21
Payer: COMMERCIAL

## 2022-02-21 NOTE — TELEPHONE ENCOUNTER
Message sent to Janae Hernandez to advise about pt needing to return to clinic on 2/25 for further assessment. 02/21/22 1:29 PM    02/21/22 3:32 PM   Returned call to patient and reviewed emergency symptoms to monitor for while his care plan is updated. If we are able to move the surgery date up, we will attempt to do so.     Pt verbalized understanding and agreement.     Dahiana Riley RN

## 2022-02-21 NOTE — TELEPHONE ENCOUNTER
I think it would be reasonable for him to return to see Richie in [place] on Friday if his symptoms have been changing significantly.

## 2022-02-21 NOTE — TELEPHONE ENCOUNTER
Patient is scheduled for surgery (ACDF 2 level) with Dr. Quiroz on 04.13.22. He is calling today to report increased left arm weakness, more tingling than originally reported. States the pain is about the same but intense at times. He was seen by his physical therapist who recommended he call us to see if there is any way possible to move his surgery date up based on his symptoms.     He would like a return call to discuss symptoms.     Best number to reach him: [phone]

## 2022-02-24 ENCOUNTER — THERAPY VISIT (OUTPATIENT)
Dept: PHYSICAL THERAPY | Facility: CLINIC | Age: 62
End: 2022-02-24
Payer: COMMERCIAL

## 2022-02-24 DIAGNOSIS — M54.12 CERVICAL RADICULOPATHY: Primary | ICD-10-CM

## 2022-02-24 DIAGNOSIS — M48.02 SPINAL STENOSIS IN CERVICAL REGION: ICD-10-CM

## 2022-02-24 PROCEDURE — 97530 THERAPEUTIC ACTIVITIES: CPT | Mod: GP | Performed by: PHYSICAL THERAPIST

## 2022-02-24 PROCEDURE — 97110 THERAPEUTIC EXERCISES: CPT | Mod: GP | Performed by: PHYSICAL THERAPIST

## 2022-02-25 ENCOUNTER — OFFICE VISIT (OUTPATIENT)
Dept: NEUROSURGERY | Facility: CLINIC | Age: 62
End: 2022-02-25
Payer: COMMERCIAL

## 2022-02-25 VITALS
WEIGHT: 170 LBS | HEART RATE: 66 BPM | OXYGEN SATURATION: 95 % | DIASTOLIC BLOOD PRESSURE: 64 MMHG | BODY MASS INDEX: 23.8 KG/M2 | HEIGHT: 71 IN | SYSTOLIC BLOOD PRESSURE: 119 MMHG

## 2022-02-25 DIAGNOSIS — M48.02 SPINAL STENOSIS IN CERVICAL REGION: ICD-10-CM

## 2022-02-25 DIAGNOSIS — M54.12 CERVICAL RADICULOPATHY: Primary | ICD-10-CM

## 2022-02-25 DIAGNOSIS — Z11.59 ENCOUNTER FOR SCREENING FOR OTHER VIRAL DISEASES: Primary | ICD-10-CM

## 2022-02-25 PROCEDURE — 99213 OFFICE O/P EST LOW 20 MIN: CPT | Performed by: NEUROLOGICAL SURGERY

## 2022-02-25 NOTE — NURSING NOTE
Pt is return and changes in symptoms.pt has pain in the left shoulder down to his elbow. Pt has numbness and tingling in the 1st 3 fingers of the left hand. Ph has left arm weakness.   Jack Castaneda MA

## 2022-02-25 NOTE — PROGRESS NOTES
Mr. Ny returns and reports increasing pain and numbness into his left upper extremity associated with increased weakness primarily in the use of his left tricep.  He unfortunately continues to smoke cigarettes although he reports that he is diminished their use substantially.  On repeat examination he is found to have 3/5 strength of his left triceps, slight weakness of his left biceps with giveaway weakness of left hand intrinsics and intact left deltoid.  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    I again reviewed the clinical and radiographic findings with him and discussed management alternatives.  I spoke with him again regarding smoking cessation to eliminate nicotine in the perioperative period.  We will do our best to expedite his surgery and move it up from mid April to most likely March 9.  I am hopeful that this will give him the opportunity to cease the use of nicotine-containing products prior to the scheduled surgery.    I went over the details of the surgery with him today in the office and ensured that his questions were answered to his apparent satisfaction.    Approximately 20 minutes in total was spent with the patient the majority counseling regarding perioperative surgical concerns, risks and benefits.

## 2022-02-25 NOTE — LETTER
2/25/2022         RE: Shaq Ny  [address] [facility] [place] [address]        Dear Colleague,    Thank you for referring your patient, Shaq Ny, to the [facility]UROSURGERY [facility]INIC [facility]. Please see a copy of my visit note below.    Mr. Ny returns and reports increasing pain and numbness into his left upper extremity associated with increased weakness primarily in the use of his left tricep.  He unfortunately continues to smoke cigarettes although he reports that he is diminished their use substantially.  On repeat examination he is found to have 3/5 strength of his left triceps, slight weakness of his left biceps with giveaway weakness of left hand intrinsics and intact left deltoid.  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    I again reviewed the clinical and radiographic findings with him and discussed management alternatives.  I spoke with him again regarding smoking cessation to eliminate nicotine in the perioperative period.  We will do our best to expedite his surgery and move it up from mid April to most likely March 9.  I am hopeful that this will give him the opportunity to cease the use of nicotine-containing products prior to the scheduled surgery.    I went over the details of the surgery with him today in the office and ensured that his questions were answered to his apparent satisfaction.    Approximately 20 minutes in total was spent with the patient the majority counseling regarding perioperative surgical concerns, risks and benefits.      Again, thank you for allowing me to participate in the care of your patient.        Sincerely,        Eric Quiroz MD

## 2022-02-28 NOTE — TELEPHONE ENCOUNTER
ORDER FROM: Dr. Quiroz    PRE AUTHORIZATION: Pending PA    METHOD OF PATIENT CONTACT: Spoke with patient over the phone; Best number to reach him: [phone]    PROCEDURE: Anterior cervical discectomy at C5-C6 and C6-C7; Instrumented fusion at C5-C6; Cervical arthroplasty at C6-C7    SURGICAL DATE: 03.09.22 @ 12:25 p.m. *[place]*    COVID TEST: 03.05.22 @ 9:50 a.m.     READINESS VISIT: 03.07.22 @ 1:00 p.m.    PCP, CLINIC, PHONE#: Dr. Jason; [facility]; [phone]; Pre-op physical: 03.01.22 @ 9:40 a.m.    FILM INFO: Cervical MRI: 01.31.22 @ [facility]    SURGICAL LETTER: e-mailed 02.28.22

## 2022-03-03 NOTE — TELEPHONE ENCOUNTER
I received a denial letter from patient's insurance company regarding his surgery that is scheduled for next Wednesday, March 9th. I am in the process of scheduling a peer to peer with insurance and Dr. Quiroz to try and get this approved so we are able to move forward with surgery as planned.     I called patient to make him aware of what is going on and told him I would be in contact with him as soon as I know something. He was frustrated but understood the situation.

## 2022-03-05 ENCOUNTER — LAB (OUTPATIENT)
Dept: FAMILY MEDICINE | Facility: CLINIC | Age: 62
End: 2022-03-05
Attending: NEUROLOGICAL SURGERY
Payer: COMMERCIAL

## 2022-03-05 DIAGNOSIS — Z01.818 PRE-OP TESTING: ICD-10-CM

## 2022-03-05 LAB — SARS-COV-2 RNA RESP QL NAA+PROBE: NEGATIVE

## 2022-03-05 PROCEDURE — U0003 INFECTIOUS AGENT DETECTION BY NUCLEIC ACID (DNA OR RNA); SEVERE ACUTE RESPIRATORY SYNDROME CORONAVIRUS 2 (SARS-COV-2) (CORONAVIRUS DISEASE [COVID-19]), AMPLIFIED PROBE TECHNIQUE, MAKING USE OF HIGH THROUGHPUT TECHNOLOGIES AS DESCRIBED BY CMS-2020-01-R: HCPCS

## 2022-03-05 PROCEDURE — U0005 INFEC AGEN DETEC AMPLI PROBE: HCPCS

## 2022-03-07 ENCOUNTER — LAB (OUTPATIENT)
Dept: LAB | Facility: HOSPITAL | Age: 62
End: 2022-03-07
Payer: COMMERCIAL

## 2022-03-07 ENCOUNTER — ALLIED HEALTH/NURSE VISIT (OUTPATIENT)
Dept: NEUROSURGERY | Facility: CLINIC | Age: 62
End: 2022-03-07
Payer: COMMERCIAL

## 2022-03-07 VITALS — HEART RATE: 78 BPM | SYSTOLIC BLOOD PRESSURE: 130 MMHG | RESPIRATION RATE: 18 BRPM | DIASTOLIC BLOOD PRESSURE: 82 MMHG

## 2022-03-07 DIAGNOSIS — M54.12 CERVICAL RADICULOPATHY: Primary | ICD-10-CM

## 2022-03-07 DIAGNOSIS — M54.12 CERVICAL RADICULOPATHY: ICD-10-CM

## 2022-03-07 LAB
ANION GAP SERPL CALCULATED.3IONS-SCNC: 9 MMOL/L (ref 5–18)
BUN SERPL-MCNC: 16 MG/DL (ref 8–22)
CALCIUM SERPL-MCNC: 9.2 MG/DL (ref 8.5–10.5)
CHLORIDE BLD-SCNC: 102 MMOL/L (ref 98–107)
CO2 SERPL-SCNC: 24 MMOL/L (ref 22–31)
CREAT SERPL-MCNC: 0.98 MG/DL (ref 0.7–1.3)
GFR SERPL CREATININE-BSD FRML MDRD: 87 ML/MIN/1.73M2
GLUCOSE BLD-MCNC: 170 MG/DL (ref 70–125)
POTASSIUM BLD-SCNC: 3.9 MMOL/L (ref 3.5–5)
SODIUM SERPL-SCNC: 135 MMOL/L (ref 136–145)

## 2022-03-07 PROCEDURE — 36415 COLL VENOUS BLD VENIPUNCTURE: CPT

## 2022-03-07 PROCEDURE — 80048 BASIC METABOLIC PNL TOTAL CA: CPT

## 2022-03-07 PROCEDURE — 99207 PR NO CHARGE NURSE ONLY: CPT

## 2022-03-07 NOTE — PROGRESS NOTES
Preop Assessment: Shaq Ny presents for pre-op review.  Surgeon: Dr. Quiroz  Name of Surgery: ACDF C5-7  Diagnosis: cervical stenosis  Date of Surgery: 03/09/2022  Time of Surgery: 1230  Hospital: [facility]  H&P: on 03/01/2022 - cleared for surgery  History of ASA, NSAIDS, vitamin and/or herbal supplements within 10 days: yes  History of blood thinners: no  History of anti-seizure med's: no  Review of systems: unchanged    Diagnostics:  Labs: WNL  CXR: n/a  EKG: stable  Other: [place] J collar to OR  Films: MRI from 01/31/2022 - in [facility]      Last BM - 03/06/2022  Nausea or Vomiting - denies  Urinary retention - denies  Pain management - no Red Flags  Home PT Evaluation: ambulates independently, steady gait and stride, no imbalance noted  - no indication for Home PT pre-op  Patient confirmed they have help/assistance in place at home upon discharge      All questions answered regarding surgery and expected pre and postoperative course including rehabilitation phase.     Reviewed with patient: Arrive 2.5 -3 hours prior to scheduled surgery, nothing to eat or drink after midnight the night before surgery and bring all pertinent films to the hospital the day of surgery.  Continue to refrain from NSAIDS (Ibuprofen, Aleve, Naprosyn) ASA or over the counter herbal medication or supplements, anticoagulants and blood thinners.    Preop skin preparations and instructions provided.    Incentive Spirometer usage instructions provided.    Patient confirmed they have help/assistance in place at home upon discharge.

## 2022-03-07 NOTE — PATIENT INSTRUCTIONS
Please review COMPLETE information about your proposed surgery, pre-operative requirements, post-operative course and expectations - available in a folder provided to you in clinic!    Your surgery scheduler will call you to begin the process of scheduling your surgery and appointments.     Pre-Operative      Pre-operative physical / Lab work with primary care physician within 30 days of surgical date. We will assist you in scheduling this.    o If all pre-op appointments/test are not completed prior to your surgery date, you will be asked to reschedule your surgery.           As part of preparation for your upcoming procedure you are required to have a test for the novel Coronavirus/COVID-19.  o The test needs to be completed within 4 days (96) hours of surgery.   o We will assist you in scheduling this.   o You may NOT receive the COVID-19 vaccine or booster 2 weeks before or after surgery.      Readiness Visits    o Prior to surgery, you may have a telephone or in person readiness visit with our RN team to discuss your upcomming surgery, results of your pre-op physical, and lab work.   o If you will require a collar/neck brace after surgery, you will be fitted for one at your readiness visit prior to surgery (scheduled by the surgery scheduler).       Shower procedure    o Hibiclens shower: Use one packet the night before surgery and one packet the morning of surgery for a whole body shower. Avoid face, hair, and genitals.        Eating/Drinking    o Stop all solid foods 8 hours before surgery.  o Keep drinking clear liquids until 4 hours before surgery  - Clear liquids include water, clear juice, black coffee, or clear tea without milk, Gatorade, clear soda.       Medications - please refer to the pre-operative medication instructions sheet in your folder    o Hold Aspirin, NSAIDs (Advil/Ibuprofen, Indocin, Naproxen,Nuprin,Relafen/Nabumetone, Diclofenac,Meloxicam, Aleve, Celebrex) and all vitamins and  supplements x 7-10 days prior to surgical date  o You can take Tylenol (Acetaminophen) for pain up until the date of your surgery   - Do not exceed 3,000 mg per day   o Any other medications prescribed, please discuss with your primary care provider at your pre-operative physical. Please discuss when/if it is safe for you to stop taking blood thinners with your primary care provider.   o We will NOT provide pain medications prior to surgery. We will prescribe post-op pain medications for up to 6 weeks after surgery.       FMLA/Short-term disability      If you are currently employed, you will likely need to be off work for 4-6 weeks for post-op recovery and healing.    Please fax any FMLA/short term disability paperwork to [phone], mail it into the clinic, drop it off in person, or send via a [employer] message.     You may also call our clinic with the date in which you'd like to return to work, and we can provide a work letter to your employer    We will support Short-Term Disability up to 12 weeks, beginning the date of your surgery. We do NOT support Long-Term Disability/Social Security Benefits.     Pain Management after surgery      Dealing with pain    o As your body heals, you might feel a stabbing, burning, or aching pain. You may also have some numbness.  o Everyone feels pain differently, we may ask you to rate your pain using a pain scale. This will let us know how much pain you feel.   o Keep in mind that medicine won't take away all of your pain. It helps to try other ways to relax and ease pain.       Things to help with pain    o After surgery, we will give you medicine for your pain. These medications work well, but they can make you drowsy, itchy, or sick to your stomach, and constipated. Try to take narcotics with food if they cause nausea.   o For mild to moderate pain, you can take medication such as Tylenol or Ibuprofen. These can be used with narcotics and muscle relaxants. *If you have had  a fusion: do NOT use NSAIDs for 6 months after surgery.   - Do NOT drive while taking narcotic pain medication  - Do NOT drink alcohol while using narcotic pain medication  o You can utilize ice as needed (no longer than 20 minutes at one time) you may apply this over your covered incision  o Utilize heat for muscle spasms, do not apply heat over your incision  o If a muscle relaxer is prescribed, please do NOT take it at the same time as your narcotic pain medication. Take them at least 90 minutes apart.   o You may also use pain cream/patches on sore muscles. Do NOT apply these on your incision. Patches may be cut in 1/2 if needed.     *After your surgery, if you will be staying in-patient, a nursing team will be monitoring you closely throughout your stay and communicate your health status to your surgeon and other providers.  You will be seen by Advanced Practice Providers (e.g., nurse practitioners, clinic nurse specialists, and physician assistants) who will check on you regularly to assess the status of your surgical recovery.     Incision Care      Look at your incision site every day. You  may need a mirror, or family member to help you.     Do not submerge your incision in water such as pools, hot tubs, baths for at least 6 weeks or until incision is healed    You may get your incision wet in the shower. Allow water and soap to run over incision, and gently pat dry.     Remove the dressing the day after you are discharged from the hospital. Keep the incision clean and dry at all times. This may require several bandage changes.     Contact us right away if you have:   o Fever over 101 degrees farenheit  o Green or yellow drainage (pus) from your incision or increased bloody drainage   o Redness, swelling, or warmth at your surgery site   o Notify the clinic [phone].    Activity Restrictions      For the first 6 weeks, no lifting,pushing, or pulling > 5-10 pounds, no bending, twisting.    Use the stairs  in moderation     Walking: Walking is the best way to start exercise after surgery. Take short frequent walks. You may gradually increase the distance as tolerated. If you feel pain, decrease your activity, but DO NOT stop walking. Walking will help you regain strength.    Avoid prolonged positioning for longer than 30 minutes (change positions frequently while awake)    No contact sports until after follow up visit    No high impact activities such as; running/jogging, snowmobile or 4 boucher riding or any other recreational vehicles until deemed safe by your surgeon/care team.     Please call the clinic if you develop any of the following symptoms:  o Swelling and/or warmth in one or both legs  o Pain or tenderness in your leg, ankle, foot, or arm   o Red or discolored/pale skin     Post-Op Follow Up Appointments      We will call you to schedule these appointments after your discharge from the hospital.     Incision assessment within 2 weeks with a Registered Nurse     6 week post-op with a Nurse Practitioner/Physician Assistant. Your surgeon will be available on this day.

## 2022-03-08 RX ORDER — ASPIRIN 81 MG/1
81 TABLET ORAL DAILY
COMMUNITY
End: 2022-03-08 | Stop reason: HOSPADM

## 2022-03-08 RX ORDER — CYCLOBENZAPRINE HCL 5 MG
5 TABLET ORAL
COMMUNITY
End: 2022-03-08 | Stop reason: HOSPADM

## 2022-03-08 RX ORDER — LISINOPRIL 2.5 MG/1
2.5 TABLET ORAL DAILY
COMMUNITY
End: 2022-03-08 | Stop reason: HOSPADM

## 2022-04-18 NOTE — PROGRESS NOTES
Discharge Note    Progress reporting period is from initial evaluation date (please see noted date below) to Feb 24, 2022.  Linked Episodes   Type: Episode: Status: Noted: Resolved: Last update: Updated by:   PHYSICAL THERAPY Cervical Radiculopathy 2/10/22 Active 2/10/2022  2/24/2022  8:45 AM Albert Jones, PT      Comments:       Shaq failed to follow up and current status is unknown.  Please see information below for last relevant information on current status.  Patient seen for   visits.    SUBJECTIVE  Subjective changes noted by patient:  Patient reports he is feeling a little better overall - less intense pain. Pain still going down from left shoulder to elbow. Flare up of pain after doing theraband exercises so patient has stopped this exercise.   .  Current pain level is  (3-4/10 pain).     Previous pain level was  9/10.   Changes in function:  Yes (See Goal flowsheet attached for changes in current functional level)  Adverse reaction to treatment or activity: None    OBJECTIVE  Changes noted in objective findings: cervical AROM: left rotation = mod loss and pain, right rotation = min loss and pain, left tricep strength = 3/5     ASSESSMENT/PLAN  Diagnosis: L cervical radiculopathy    Updated problem list and treatment plan:     STG/LTGs have been met or progress has been made towards goals:  Yes, please see goal flowsheet for most current information  Assessment of Progress: current status is unknown.    Last current status: Pt is progressing slower than anticipated   Self Management Plans:  HEP  I have re-evaluated this patient and find that the nature, scope, duration and intensity of the therapy is appropriate for the medical condition of the patient.  Shaq continues to require the following intervention to meet STG and LTG's:  HEP.    Recommendations:  Discharge with current home program.  Patient to follow up with MD as needed.    Please refer to the daily flowsheet for treatment today, total  treatment time and time spent performing 1:1 timed codes.

## 2022-06-03 ENCOUNTER — OFFICE VISIT (OUTPATIENT)
Dept: NEUROSURGERY | Facility: CLINIC | Age: 62
End: 2022-06-03
Payer: COMMERCIAL

## 2022-06-03 VITALS
WEIGHT: 170 LBS | BODY MASS INDEX: 23.8 KG/M2 | HEIGHT: 71 IN | OXYGEN SATURATION: 97 % | DIASTOLIC BLOOD PRESSURE: 52 MMHG | SYSTOLIC BLOOD PRESSURE: 105 MMHG | HEART RATE: 73 BPM

## 2022-06-03 DIAGNOSIS — M48.02 SPINAL STENOSIS IN CERVICAL REGION: Primary | ICD-10-CM

## 2022-06-03 DIAGNOSIS — M54.12 CERVICAL RADICULOPATHY: ICD-10-CM

## 2022-06-03 PROCEDURE — 99214 OFFICE O/P EST MOD 30 MIN: CPT | Performed by: NEUROLOGICAL SURGERY

## 2022-06-03 RX ORDER — CYCLOBENZAPRINE HCL 5 MG
5 TABLET ORAL 3 TIMES DAILY PRN
COMMUNITY
End: 2022-08-25

## 2022-06-03 RX ORDER — DEXAMETHASONE SODIUM PHOSPHATE 10 MG/ML
4 INJECTION, SOLUTION INTRAMUSCULAR; INTRAVENOUS ONCE
Status: CANCELLED | OUTPATIENT
Start: 2022-06-03 | End: 2022-06-03

## 2022-06-03 NOTE — NURSING NOTE
Pt is follow up for cervical. Pt states that he has pain in the neck that goes down to his shoulders and stops at the elbow and the left is worse. Pt has numbness in the 1st 3 fingers of the left hand. Pt has left arm weakness and little it balance issue.   NDI: 42%  Jack Castaneda MA

## 2022-06-03 NOTE — LETTER
6/3/2022         RE: Shaq Ny  [address] [id number]        Dear Colleague,    Thank you for referring your patient, Shaq Ny, to the [facility]. Please see a copy of my visit note below.    Mr. Brice returns with persistent and increasing left upper extremity radicular symptoms including weakness of his left triceps and continual numbness in the left C7 dermatomal pattern.  He suffers from spinal canal stenosis at C5-6 without evidence of intrinsic signal change within the cervical spinal cord on the study obtained in January 2022 as well as a focal disc protrusion at left C6-7 producing severe impingement of the exiting left C7 nerve root.  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  Unfortunately, his health insurance is with [employer] and this surgery was not approved.  He was advised by the insurance carrier that he should have 2 separate cervical incisions 1 to perform a discectomy and fusion at C5-6 followed by a second procedure at some later time to perform a cervical discectomy and arthroplasty at C6-7.  The absurdity of this recommendation including the potential added risk to the patient in terms of repeat operative exposure through scar, a significant delay in accomplishing either spinal cord or spinal nerve decompression and then the significant financial and occupational penalty of going through 2 separate procedures makes this an irresponsible, unethical and  unsafe recommendation.    He returns to discuss options because of his continued neurologic deficit and increasing radicular pain.  I have counseled him that the only reasonable option I believe would be a two-level anterior cervical discectomy and fusion at C5-6 and C6-7 to accomplish spinal cord decompression and stabilization at the C5-6 level and removal of the disc herniation then left C6-7 with decompression of the affected left C7 nerve root.  I spoke with him at some length regarding the details of this procedure as well as the potential risks and benefits.  I told him that the risks of this procedure include failure to improve his symptoms, increased pain weakness or numbness, injury to cervical structures including but not limited to the trachea, esophagus, great vessels, recurrent laryngeal nerve, prolonged difficulty with swallowing, failure of fusion, need for revision and the possibility of accelerated degenerative change at adjacent levels.  He appeared to have a good understanding of the situation and conclusion of our discussion, asked appropriate questions which I answered and wished to proceed with surgery as soon as practical.    Just over 40 minutes was spent reviewing clinical and radiographic findings and counseling the patient regarding options available for treating this continuing problem.      Again, thank you for allowing me to participate in the care of your patient.        Sincerely,        Eric Quiroz MD

## 2022-06-03 NOTE — PROGRESS NOTES
Mr. Brice returns with persistent and increasing left upper extremity radicular symptoms including weakness of his left triceps and continual numbness in the left C7 dermatomal pattern.  He suffers from spinal canal stenosis at C5-6 without evidence of intrinsic signal change within the cervical spinal cord on the study obtained in January 2022 as well as a focal disc protrusion at left C6-7 producing severe impingement of the exiting left C7 nerve root.  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  Unfortunately, his health insurance is with [employer] and this surgery was not approved.  He was advised by the insurance carrier that he should have 2 separate cervical incisions 1 to perform a discectomy and fusion at C5-6 followed by a second procedure at some later time to perform a cervical discectomy and arthroplasty at C6-7.  The absurdity of this recommendation including the potential added risk to the patient in terms of repeat operative exposure through scar, a significant delay in accomplishing either spinal cord or spinal nerve decompression and then the significant financial and occupational penalty of going through 2 separate procedures makes this an irresponsible, unethical and unsafe recommendation.    He returns to discuss options because of his continued neurologic deficit and increasing radicular pain.  I have counseled him that the only reasonable option I believe would be a two-level anterior cervical discectomy and fusion at  C5-6 and C6-7 to accomplish spinal cord decompression and stabilization at the C5-6 level and removal of the disc herniation then left C6-7 with decompression of the affected left C7 nerve root.  I spoke with him at some length regarding the details of this procedure as well as the potential risks and benefits.  I told him that the risks of this procedure include failure to improve his symptoms, increased pain weakness or numbness, injury to cervical structures including but not limited to the trachea, esophagus, great vessels, recurrent laryngeal nerve, prolonged difficulty with swallowing, failure of fusion, need for revision and the possibility of accelerated degenerative change at adjacent levels.  He appeared to have a good understanding of the situation and conclusion of our discussion, asked appropriate questions which I answered and wished to proceed with surgery as soon as practical.    Just over 40 minutes was spent reviewing clinical and radiographic findings and counseling the patient regarding options available for treating this continuing problem.

## 2022-06-06 ENCOUNTER — TELEPHONE (OUTPATIENT)
Dept: NEUROSURGERY | Facility: CLINIC | Age: 62
End: 2022-06-06
Payer: COMMERCIAL

## 2022-06-06 NOTE — TELEPHONE ENCOUNTER
LM for patient letting him know we are working on getting a surgery date. As soon as we have a date for him, either myself or Elva will call him and assist on getting all other pre-op appointments scheduled.

## 2022-07-27 DIAGNOSIS — Z01.812 ENCOUNTER FOR PREOPERATIVE SCREENING LABORATORY TESTING FOR COVID-19 VIRUS: Primary | ICD-10-CM

## 2022-07-27 DIAGNOSIS — Z11.52 ENCOUNTER FOR PREOPERATIVE SCREENING LABORATORY TESTING FOR COVID-19 VIRUS: Primary | ICD-10-CM

## 2022-08-03 ENCOUNTER — TELEPHONE (OUTPATIENT)
Dept: NEUROSURGERY | Facility: CLINIC | Age: 62
End: 2022-08-03

## 2022-08-03 NOTE — LETTER
Dear Mr. Ny,    This letter will help in preparation for your upcoming surgery. Please contact us with any additional questions you may have regarding your surgery. Contact information for your surgery scheduler:     Dr. Marsh: [phone] Paula BerkowitzElva    You are scheduled for: Anterior cervical discectomy and fusion at cervical   With: Eric Quiroz MD  Date/Time: August 10th, 2022 at 12:25 (time subject to change)  Location: [place] [address]    Check in at the Welcome Desk inside the main doors of the hospital. They will direct you to the surgery waiting area. Please arrive by 10:25am.    In the event of an emergency surgery case, there may be an adjustment to your start time for surgery.     PREPARING FOR YOUR SURGERY    *Pre-op Physical: 08/05/2022 at 3pm at [address] [place] [address]    *Please discuss the necessity of receiving a pneumococcal vaccine prior to surgery at your pre-op physical. Recommended for all patients over the age of 65 or based on certain medical conditions.     *After the pre-op physical is complete, please have your clinic fax the visit note to our office at [phone].    *Covid-19 Pre-procedure Test: 08/05/2022 at 1:45pm at [address] [place] [address]  Phone # [phone]    *Collar/Brace: You will be fitted for the collar/brace at your Readiness Visit with our office.     Please bring collar/brace with you the day of surgery.    *Readiness Visit: 08/08/2022 at 9:30am at the [facility] [address] [address].    *Ensure that you have completed your pre-op physical, along with any other necessary tests/appointments (listed above), prior to your Readiness Visit.                         ADDITIONAL INFORMATION REGARDING YOUR SURGERY    Medications    Bring a list ALL medications, including any over-the-counter vitamins and herbal supplements with  you to the hospital on the day of surgery.    DO NOT bring your medications with you the day of surgery.    Please see attached third sheet for more details on medications/vitamins/herbal supplements that should be discontinued prior to your surgery date.     If you are unsure if you should discontinue a medication/ vitamin/herbal supplement, please call our office and discuss with a nurse.    Continue taking your medications/vitamins/herbal supplements unless they are on the attached list.     Failure to follow the instructions regarding medications/vitamins/herbal supplements will result in cancellation of your surgery.    Day BEFORE Surgery    DO NOT shave near your surgical site. This can cause irritation of the skin    Using a washcloth and provided bottle of Hibiclens, shower the night BEFORE surgery, using a half bottle of Hibiclens to wash your body, avoiding face and genitals. The morning OF surgery, shower and use the second half of the bottle to wash your body, avoiding face and genitals. If you are unable to take a shower the morning of surgery, please discuss your options with the nurse at your readiness visit.     NOTHING to eat after 11:00 p.m. the night prior to surgery.    CLEAR LIQUIDS: May have the following liquids up to two (2) hours before your arrival time at the hospital: water, plain black coffee (no cream or milk), plain black tea or plain green tea (no cream or milk), Gatorade or Propel Water.    SMOKING: Stop smoking as far before surgery as possible, or as directed by your surgeon. NO tobacco products of any kind (cigarettes, e-cigarettes, chewing tobacco) beginning at 11:00 p.m. the night prior to your procedure.     ALCOHOL: You should stop drinking alcohol beginning at 11:00 p.m. the night prior to your procedure    Contact our office if you have symptoms of illness such as a fever of 101 or greater, chills, cough, sore throat, or if you develop a rash or any open sore    Day OF  Surgery    If you ve been instructed to take a medication(s) on the morning of surgery, please take with a very small sip of water.    Wear loose & comfortable clothing and flat shoes, Leave jewelry/valuables at home. If you wear contact lenses, remove them at home and wear glasses. Remove any body piercings. Remove nail polish.     Planning for Discharge    Start planning for your care after discharge as soon as you receive this letter.    If you have not made arrangements for someone to take you home and stay with you for the first 48 hours after discharge, your surgery may be cancelled.                        PRE-OPERATIVE MEDICATION INSTRUCTIONS    Review this information with your primary care physician prior to discontinuing any of the medications listed below.  Notify your primary care physician that you have been instructed to discontinue these medications.    TEN (10) Days Prior to Surgery, STOP the Following Medications   Vandana-Denver  Anacin  Aspirin  Excedrin  Pepto Bismol    **Before taking ANY over-the-counter medications, check the label for Aspirin   Non-steroidal   Anti-inflammatory Medications (NSAIDS)    Celebrex  Diclofenac (Cataflam)  Etodolac (Iodine)  Fenoprofen (Nalfon)  Ibuprofen (Advil, Motrin, Nuprin)  Indomethacin (Indocin)  Ketoprofen  Ketorolac (Toradol)  Meloxicam (Mobic)  Naproxen (AnaProx, Aleve, Naprosyn)  Relafen (Nabumetone)  Sulindac (Clinoril)   Herbal Supplements (this is a partial list of herbals to be discontinued)    Mago Gomez Quai  Ephedra  Feverfew  Fish Oil  Flaxseed Oil  Garlic  Yanet  Gingko  Ginseng  Goldenseal  Imitrex (Sumatriptan)  Kava  Krill Oil  Licorice  Multi Vitamins  Tekonsha s Wort  Turmeric  Valerian  Vitamin E  Yohimbe   CHECK WITH YOUR PRESCRIBING DOCTOR BEFORE STOPPING ANY BLOOD THINNERS (approximately 7 days prior to surgery)  (Coumadin, Plavix, Eliquis, Xarelto, Pradaxa, Platel, Aggrenox, Effient (Prasugrel), Ticlid)      ALWAYS CHECK WITH YOUR  PRESCRIBING DOCTOR REGARDING THE MEDICATIONS LISTED BELOW; RECOMMENDED STOP TIME IS ALSO LISTED      If you are taking Lovenox, discontinue 24 HOURS prior to surgery    If you are taking weight loss medication, discontinue 7 days prior to surgery    If you are taking Metformin or Simvastatin, check with your primary care physician (or whoever has prescribed you this medication) regarding when to discontinue prior to surgery

## 2022-08-03 NOTE — TELEPHONE ENCOUNTER
ORDER FROM: Dr. Quiroz     PRE AUTHORIZATION: PA Authed    METHOD OF PATIENT CONTACT: Spoke with patient over the phone # [phone]    PROCEDURE: Anterior cervical discectomy and fusion at cervical 5 to cervical 6 and cervical 6 to cervical 7 for spinal stenosis and left cervical 7 radiculopathy    SURGICAL DATE: 08/10/2022 at [facility] at 12:25    COVID TEST: 08/05/2022 at  clinic    READINESS VISIT: 08/08/2022     PCP, CLINIC, PHONE#:  [facility] #[phone]    FILM INFO: MRI Cervical at [facility] 01/31/2022    SURGICAL LETTER: Mailed 08/03/2022

## 2022-08-05 ENCOUNTER — LAB (OUTPATIENT)
Dept: LAB | Facility: CLINIC | Age: 62
End: 2022-08-05

## 2022-08-05 DIAGNOSIS — Z01.812 ENCOUNTER FOR PREOPERATIVE SCREENING LABORATORY TESTING FOR COVID-19 VIRUS: ICD-10-CM

## 2022-08-05 DIAGNOSIS — Z11.52 ENCOUNTER FOR PREOPERATIVE SCREENING LABORATORY TESTING FOR COVID-19 VIRUS: ICD-10-CM

## 2022-08-05 PROCEDURE — U0003 INFECTIOUS AGENT DETECTION BY NUCLEIC ACID (DNA OR RNA); SEVERE ACUTE RESPIRATORY SYNDROME CORONAVIRUS 2 (SARS-COV-2) (CORONAVIRUS DISEASE [COVID-19]), AMPLIFIED PROBE TECHNIQUE, MAKING USE OF HIGH THROUGHPUT TECHNOLOGIES AS DESCRIBED BY CMS-2020-01-R: HCPCS

## 2022-08-05 PROCEDURE — U0005 INFEC AGEN DETEC AMPLI PROBE: HCPCS

## 2022-08-06 LAB — SARS-COV-2 RNA RESP QL NAA+PROBE: NEGATIVE

## 2022-08-08 NOTE — TELEPHONE ENCOUNTER
Pt has cervical collar from March 2022 surgery cancellation. In office pre op readiness visit not needed. RN team will call day prior to surgery.     Dahiana Riley RN

## 2022-08-09 ENCOUNTER — TELEPHONE (OUTPATIENT)
Dept: NEUROSURGERY | Facility: CLINIC | Age: 62
End: 2022-08-09

## 2022-08-09 ENCOUNTER — LAB (OUTPATIENT)
Dept: LAB | Facility: CLINIC | Age: 62
End: 2022-08-09
Attending: NEUROLOGICAL SURGERY

## 2022-08-09 DIAGNOSIS — Z11.59 ENCOUNTER FOR SCREENING FOR OTHER VIRAL DISEASES: ICD-10-CM

## 2022-08-09 LAB — SARS-COV-2 RNA RESP QL NAA+PROBE: NEGATIVE

## 2022-08-09 PROCEDURE — U0005 INFEC AGEN DETEC AMPLI PROBE: HCPCS

## 2022-08-09 PROCEDURE — U0003 INFECTIOUS AGENT DETECTION BY NUCLEIC ACID (DNA OR RNA); SEVERE ACUTE RESPIRATORY SYNDROME CORONAVIRUS 2 (SARS-COV-2) (CORONAVIRUS DISEASE [COVID-19]), AMPLIFIED PROBE TECHNIQUE, MAKING USE OF HIGH THROUGHPUT TECHNOLOGIES AS DESCRIBED BY CMS-2020-01-R: HCPCS

## 2022-08-09 RX ORDER — INSULIN ASPART 100 [IU]/ML
10 INJECTION, SOLUTION INTRAVENOUS; SUBCUTANEOUS ONCE
COMMUNITY
End: 2022-10-28

## 2022-08-09 RX ORDER — ACETAMINOPHEN 500 MG
500-1000 TABLET ORAL PRN
COMMUNITY
End: 2023-12-20

## 2022-08-09 RX ORDER — ASPIRIN 81 MG/1
81 TABLET, CHEWABLE ORAL DAILY
COMMUNITY
End: 2022-08-09

## 2022-08-09 RX ORDER — DEXAMETHASONE 1 MG
1 TABLET ORAL 2 TIMES DAILY WITH MEALS
COMMUNITY
End: 2022-08-09

## 2022-08-09 RX ORDER — INSULIN ASPART 100 [IU]/ML
6-10 INJECTION, SOLUTION INTRAVENOUS; SUBCUTANEOUS
COMMUNITY
End: 2022-08-09

## 2022-08-09 RX ORDER — LISINOPRIL 2.5 MG/1
2.5 TABLET ORAL DAILY
COMMUNITY
End: 2022-08-09

## 2022-08-09 NOTE — TELEPHONE ENCOUNTER
Called patient, discussed surgery, post-op course, expectations, follow up plan.    Reviewed H&P from - 8/5/22 cleared for surgery.   Labs - WNL     MRI done on 1/31/22 - in [facility]    To OR as planned. Check in - 10:25 am at [facility]    Pt will bring Sai J Collar to OR.     Nothing to eat or drink after midnight the night before surgery.     Bring all pertinent films to hospital the day of surgery.     Continue to refrain from NSAIDS (Ibuprofen, Aleve, Naprosyn), ASA, Over the counter herbal medications or supplements, anti-coagulants and blood thinners.     Shower Instructions: using a washcloth and a bottle of provided Hibiclens, wash your body, avoiding your face, hair, and genitals. Preferably, shower the night before surgery and the morning of surgery using a half a bottle each time for your whole body shower.    Patient confirmed they have help/assistance in place at home upon discharge, significant other.    Patient was informed that we will provide up to 1 week prescription of pain medication for post-operative pain. Pt is not a chronic pain patient. Reports adverse reaction to oxycodone - requested oral morphine instead. Does not have experience with other      Instructed patient about the following: After your surgery, if you will be staying in-patient, a nursing provider team will be monitoring you closely throughout your stay and communicate your health status to your surgeon and other providers.  You will be seen by Advanced Practice Providers (e.g., nurse practitioners, clinic nurse specialist, and physician assistants) who will check on you regularly to assess the status of your surgery.   All of pt's questions were answered to his satisfaction. He was informed that we will call after discharge to schedule all necessary post-op follow up appointments.     Dahiana Riley RN

## 2022-08-09 NOTE — PROGRESS NOTES
PTA medications updated by Medication Scribe prior to surgery via phone call with patient (last doses completed by Nurse)     Medication history sources: Patient and H&P  In the past week, patient estimated taking medication this percent of the time: Greater than 90%  Adherence assessment: N/A Not Observed    Significant changes made to the medication list:  Patient reports no longer taking the following meds (med scribe removed from PTA med list): Aspirin, Dexamethasone,Gabapentin, Novolog, Lisinopril      Additional medication history information:   None    Medication reconciliation completed by provider prior to medication history? No    Time spent in this activity: 60 minutes    The information provided in this note is only as accurate as the sources available at the time of update(s)      Prior to Admission medications    Medication Sig Last Dose Taking? Auth Provider Long Term End Date   acetaminophen (TYLENOL) 500 MG tablet Take 500-1,000 mg by mouth as needed for mild pain  at PRN Yes Reported, Patient     cyclobenzaprine (FLEXERIL) 5 MG tablet Take 5 mg by mouth 3 times daily as needed for muscle spasms 6/1/2022 at PRN Yes Reported, Patient     insulin aspart (NOVOLOG FLEXPEN) 100 UNIT/ML pen Inject 10 Units Subcutaneous once  at AM Yes Reported, Patient     insulin NPH-Regular 70/30 (HUMULIN 70/30;NOVOLIN 70/30) (70-30) 100 UNIT/ML vial Inject 25 Units Subcutaneous daily (with breakfast)  Patient taking differently: Inject 20 Units Subcutaneous daily (with breakfast) 8/9/2022 at Before breakfast Yes Ike Rosenbaum MD Yes    insulin NPH-Regular 70/30 (HUMULIN 70/30;NOVOLIN 70/30) (70-30) 100 UNIT/ML vial Inject 30 Units Subcutaneous daily (with dinner)  Patient taking differently: Inject 25 Units Subcutaneous daily (with dinner)  at before dinner Yes Ike Rosenbaum MD Yes    insulin NPH-Regular 70/30 (HUMULIN 70/30;NOVOLIN 70/30) (70-30) 100 UNIT/ML vial Inject 6-10 Units Subcutaneous as needed for high  blood sugar (PRN per sliding scale)  at PRN Yes Reported, Patient Yes

## 2022-08-10 ENCOUNTER — HOSPITAL ENCOUNTER (OUTPATIENT)
Facility: CLINIC | Age: 62
Discharge: HOME | End: 2022-08-11
Attending: NEUROLOGICAL SURGERY | Admitting: NEUROLOGICAL SURGERY
Payer: COMMERCIAL

## 2022-08-10 ENCOUNTER — ANESTHESIA (OUTPATIENT)
Dept: SURGERY | Facility: CLINIC | Age: 62
End: 2022-08-10
Payer: COMMERCIAL

## 2022-08-10 ENCOUNTER — APPOINTMENT (OUTPATIENT)
Dept: GENERAL RADIOLOGY | Facility: CLINIC | Age: 62
End: 2022-08-10
Attending: NEUROLOGICAL SURGERY
Payer: COMMERCIAL

## 2022-08-10 ENCOUNTER — ANESTHESIA EVENT (OUTPATIENT)
Dept: SURGERY | Facility: CLINIC | Age: 62
End: 2022-08-10
Payer: COMMERCIAL

## 2022-08-10 DIAGNOSIS — M54.12 CERVICAL RADICULOPATHY: ICD-10-CM

## 2022-08-10 DIAGNOSIS — M48.02 SPINAL STENOSIS IN CERVICAL REGION: ICD-10-CM

## 2022-08-10 LAB
GLUCOSE BLDC GLUCOMTR-MCNC: 143 MG/DL (ref 70–99)
GLUCOSE BLDC GLUCOMTR-MCNC: 147 MG/DL (ref 70–99)
GLUCOSE BLDC GLUCOMTR-MCNC: 168 MG/DL (ref 70–99)
GLUCOSE BLDC GLUCOMTR-MCNC: 170 MG/DL (ref 70–99)
GLUCOSE BLDC GLUCOMTR-MCNC: 216 MG/DL (ref 70–99)
GLUCOSE BLDC GLUCOMTR-MCNC: 226 MG/DL (ref 70–99)
GLUCOSE BLDC GLUCOMTR-MCNC: 227 MG/DL (ref 70–99)
GLUCOSE BLDC GLUCOMTR-MCNC: 246 MG/DL (ref 70–99)
GLUCOSE BLDC GLUCOMTR-MCNC: 251 MG/DL (ref 70–99)
GLUCOSE BLDC GLUCOMTR-MCNC: 299 MG/DL (ref 70–99)
GLUCOSE BLDC GLUCOMTR-MCNC: 336 MG/DL (ref 70–99)

## 2022-08-10 PROCEDURE — 258N000003 HC RX IP 258 OP 636: Performed by: NURSE ANESTHETIST, CERTIFIED REGISTERED

## 2022-08-10 PROCEDURE — 99203 OFFICE O/P NEW LOW 30 MIN: CPT | Performed by: PHYSICIAN ASSISTANT

## 2022-08-10 PROCEDURE — 22845 INSERT SPINE FIXATION DEVICE: CPT | Performed by: NEUROLOGICAL SURGERY

## 2022-08-10 PROCEDURE — 22552 ARTHRD ANT NTRBD CERVICAL EA: CPT | Performed by: NEUROLOGICAL SURGERY

## 2022-08-10 PROCEDURE — 82962 GLUCOSE BLOOD TEST: CPT | Mod: 91

## 2022-08-10 PROCEDURE — 250N000011 HC RX IP 250 OP 636: Performed by: NEUROLOGICAL SURGERY

## 2022-08-10 PROCEDURE — 22551 ARTHRD ANT NTRBDY CERVICAL: CPT | Performed by: NEUROLOGICAL SURGERY

## 2022-08-10 PROCEDURE — C1713 ANCHOR/SCREW BN/BN,TIS/BN: HCPCS | Performed by: NEUROLOGICAL SURGERY

## 2022-08-10 PROCEDURE — 258N000003 HC RX IP 258 OP 636: Performed by: PHYSICIAN ASSISTANT

## 2022-08-10 PROCEDURE — 20931 SP BONE ALGRFT STRUCT ADD-ON: CPT | Performed by: NEUROLOGICAL SURGERY

## 2022-08-10 PROCEDURE — 250N000011 HC RX IP 250 OP 636: Performed by: PHYSICIAN ASSISTANT

## 2022-08-10 PROCEDURE — 370N000017 HC ANESTHESIA TECHNICAL FEE, PER MIN: Performed by: NEUROLOGICAL SURGERY

## 2022-08-10 PROCEDURE — 96372 THER/PROPH/DIAG INJ SC/IM: CPT | Performed by: PHYSICIAN ASSISTANT

## 2022-08-10 PROCEDURE — 250N000009 HC RX 250: Performed by: NURSE ANESTHETIST, CERTIFIED REGISTERED

## 2022-08-10 PROCEDURE — 22552 ARTHRD ANT NTRBD CERVICAL EA: CPT | Mod: AS | Performed by: PHYSICIAN ASSISTANT

## 2022-08-10 PROCEDURE — 250N000012 HC RX MED GY IP 250 OP 636 PS 637: Performed by: PHYSICIAN ASSISTANT

## 2022-08-10 PROCEDURE — 360N000084 HC SURGERY LEVEL 4 W/ FLUORO, PER MIN: Performed by: NEUROLOGICAL SURGERY

## 2022-08-10 PROCEDURE — 22845 INSERT SPINE FIXATION DEVICE: CPT | Mod: AS | Performed by: PHYSICIAN ASSISTANT

## 2022-08-10 PROCEDURE — 710N000009 HC RECOVERY PHASE 1, LEVEL 1, PER MIN: Performed by: NEUROLOGICAL SURGERY

## 2022-08-10 PROCEDURE — 250N000011 HC RX IP 250 OP 636: Performed by: ANESTHESIOLOGY

## 2022-08-10 PROCEDURE — 96372 THER/PROPH/DIAG INJ SC/IM: CPT | Mod: 59 | Performed by: PHYSICIAN ASSISTANT

## 2022-08-10 PROCEDURE — 250N000025 HC SEVOFLURANE, PER MIN: Performed by: NEUROLOGICAL SURGERY

## 2022-08-10 PROCEDURE — 272N000001 HC OR GENERAL SUPPLY STERILE: Performed by: NEUROLOGICAL SURGERY

## 2022-08-10 PROCEDURE — 258N000003 HC RX IP 258 OP 636: Performed by: ANESTHESIOLOGY

## 2022-08-10 PROCEDURE — 250N000011 HC RX IP 250 OP 636: Performed by: NURSE ANESTHETIST, CERTIFIED REGISTERED

## 2022-08-10 PROCEDURE — 22551 ARTHRD ANT NTRBDY CERVICAL: CPT | Mod: AS | Performed by: PHYSICIAN ASSISTANT

## 2022-08-10 PROCEDURE — 250N000009 HC RX 250: Performed by: NEUROLOGICAL SURGERY

## 2022-08-10 PROCEDURE — 999N000141 HC STATISTIC PRE-PROCEDURE NURSING ASSESSMENT: Performed by: NEUROLOGICAL SURGERY

## 2022-08-10 PROCEDURE — C1762 CONN TISS, HUMAN(INC FASCIA): HCPCS | Performed by: NEUROLOGICAL SURGERY

## 2022-08-10 PROCEDURE — 250N000012 HC RX MED GY IP 250 OP 636 PS 637: Performed by: ANESTHESIOLOGY

## 2022-08-10 PROCEDURE — 999N000179 XR SURGERY CARM FLUORO LESS THAN 5 MIN W STILLS

## 2022-08-10 DEVICE — IMPLANTABLE DEVICE: Type: IMPLANTABLE DEVICE | Site: SPINE CERVICAL | Status: FUNCTIONAL

## 2022-08-10 DEVICE — IMP SCR CERVICAL MEDT ATLANTIS 4.0X14MM ST VA 3120314: Type: IMPLANTABLE DEVICE | Site: SPINE CERVICAL | Status: FUNCTIONAL

## 2022-08-10 RX ORDER — ALBUTEROL SULFATE 0.83 MG/ML
2.5 SOLUTION RESPIRATORY (INHALATION) EVERY 4 HOURS PRN
Status: DISCONTINUED | OUTPATIENT
Start: 2022-08-10 | End: 2022-08-10 | Stop reason: HOSPADM

## 2022-08-10 RX ORDER — ONDANSETRON 2 MG/ML
INJECTION INTRAMUSCULAR; INTRAVENOUS PRN
Status: DISCONTINUED | OUTPATIENT
Start: 2022-08-10 | End: 2022-08-10

## 2022-08-10 RX ORDER — PROPOFOL 10 MG/ML
INJECTION, EMULSION INTRAVENOUS PRN
Status: DISCONTINUED | OUTPATIENT
Start: 2022-08-10 | End: 2022-08-10

## 2022-08-10 RX ORDER — ONDANSETRON 2 MG/ML
4 INJECTION INTRAMUSCULAR; INTRAVENOUS EVERY 30 MIN PRN
Status: DISCONTINUED | OUTPATIENT
Start: 2022-08-10 | End: 2022-08-10 | Stop reason: HOSPADM

## 2022-08-10 RX ORDER — NICOTINE 21 MG/24HR
1 PATCH, TRANSDERMAL 24 HOURS TRANSDERMAL DAILY
Status: DISCONTINUED | OUTPATIENT
Start: 2022-08-10 | End: 2022-08-11 | Stop reason: HOSPADM

## 2022-08-10 RX ORDER — CEFAZOLIN SODIUM/WATER 2 G/20 ML
2 SYRINGE (ML) INTRAVENOUS
Status: COMPLETED | OUTPATIENT
Start: 2022-08-10 | End: 2022-08-10

## 2022-08-10 RX ORDER — FENTANYL CITRATE 50 UG/ML
INJECTION, SOLUTION INTRAMUSCULAR; INTRAVENOUS PRN
Status: DISCONTINUED | OUTPATIENT
Start: 2022-08-10 | End: 2022-08-10

## 2022-08-10 RX ORDER — SODIUM CHLORIDE 9 MG/ML
INJECTION, SOLUTION INTRAVENOUS CONTINUOUS
Status: DISCONTINUED | OUTPATIENT
Start: 2022-08-10 | End: 2022-08-11 | Stop reason: HOSPADM

## 2022-08-10 RX ORDER — HYDROMORPHONE HCL IN WATER/PF 6 MG/30 ML
0.2 PATIENT CONTROLLED ANALGESIA SYRINGE INTRAVENOUS
Status: DISCONTINUED | OUTPATIENT
Start: 2022-08-10 | End: 2022-08-11 | Stop reason: HOSPADM

## 2022-08-10 RX ORDER — ACETAMINOPHEN 325 MG/1
650 TABLET ORAL EVERY 6 HOURS PRN
Status: DISCONTINUED | OUTPATIENT
Start: 2022-08-10 | End: 2022-08-11 | Stop reason: HOSPADM

## 2022-08-10 RX ORDER — SODIUM CHLORIDE, SODIUM LACTATE, POTASSIUM CHLORIDE, CALCIUM CHLORIDE 600; 310; 30; 20 MG/100ML; MG/100ML; MG/100ML; MG/100ML
INJECTION, SOLUTION INTRAVENOUS CONTINUOUS PRN
Status: DISCONTINUED | OUTPATIENT
Start: 2022-08-10 | End: 2022-08-10

## 2022-08-10 RX ORDER — NICOTINE POLACRILEX 4 MG
15-30 LOZENGE BUCCAL
Status: DISCONTINUED | OUTPATIENT
Start: 2022-08-10 | End: 2022-08-11 | Stop reason: HOSPADM

## 2022-08-10 RX ORDER — EPHEDRINE SULFATE 50 MG/ML
INJECTION, SOLUTION INTRAMUSCULAR; INTRAVENOUS; SUBCUTANEOUS PRN
Status: DISCONTINUED | OUTPATIENT
Start: 2022-08-10 | End: 2022-08-10

## 2022-08-10 RX ORDER — FENTANYL CITRATE 0.05 MG/ML
25 INJECTION, SOLUTION INTRAMUSCULAR; INTRAVENOUS
Status: DISCONTINUED | OUTPATIENT
Start: 2022-08-10 | End: 2022-08-10 | Stop reason: HOSPADM

## 2022-08-10 RX ORDER — PROCHLORPERAZINE MALEATE 10 MG
10 TABLET ORAL EVERY 6 HOURS PRN
Status: DISCONTINUED | OUTPATIENT
Start: 2022-08-10 | End: 2022-08-11 | Stop reason: HOSPADM

## 2022-08-10 RX ORDER — LIDOCAINE 40 MG/G
CREAM TOPICAL
Status: DISCONTINUED | OUTPATIENT
Start: 2022-08-10 | End: 2022-08-11 | Stop reason: HOSPADM

## 2022-08-10 RX ORDER — NALOXONE HYDROCHLORIDE 0.4 MG/ML
0.4 INJECTION, SOLUTION INTRAMUSCULAR; INTRAVENOUS; SUBCUTANEOUS
Status: DISCONTINUED | OUTPATIENT
Start: 2022-08-10 | End: 2022-08-11 | Stop reason: HOSPADM

## 2022-08-10 RX ORDER — HYDRALAZINE HYDROCHLORIDE 20 MG/ML
2.5-5 INJECTION INTRAMUSCULAR; INTRAVENOUS EVERY 10 MIN PRN
Status: DISCONTINUED | OUTPATIENT
Start: 2022-08-10 | End: 2022-08-10 | Stop reason: HOSPADM

## 2022-08-10 RX ORDER — OXYCODONE HYDROCHLORIDE 5 MG/1
5 TABLET ORAL EVERY 4 HOURS PRN
Status: DISCONTINUED | OUTPATIENT
Start: 2022-08-10 | End: 2022-08-10 | Stop reason: HOSPADM

## 2022-08-10 RX ORDER — ONDANSETRON 4 MG/1
4 TABLET, ORALLY DISINTEGRATING ORAL EVERY 6 HOURS PRN
Status: DISCONTINUED | OUTPATIENT
Start: 2022-08-10 | End: 2022-08-11 | Stop reason: HOSPADM

## 2022-08-10 RX ORDER — ONDANSETRON 2 MG/ML
4 INJECTION INTRAMUSCULAR; INTRAVENOUS EVERY 6 HOURS PRN
Status: DISCONTINUED | OUTPATIENT
Start: 2022-08-10 | End: 2022-08-11 | Stop reason: HOSPADM

## 2022-08-10 RX ORDER — TRAMADOL HYDROCHLORIDE 50 MG/1
50 TABLET ORAL EVERY 6 HOURS PRN
Status: DISCONTINUED | OUTPATIENT
Start: 2022-08-10 | End: 2022-08-11 | Stop reason: HOSPADM

## 2022-08-10 RX ORDER — NALOXONE HYDROCHLORIDE 0.4 MG/ML
0.2 INJECTION, SOLUTION INTRAMUSCULAR; INTRAVENOUS; SUBCUTANEOUS
Status: DISCONTINUED | OUTPATIENT
Start: 2022-08-10 | End: 2022-08-11 | Stop reason: HOSPADM

## 2022-08-10 RX ORDER — GLYCOPYRROLATE 0.2 MG/ML
INJECTION, SOLUTION INTRAMUSCULAR; INTRAVENOUS PRN
Status: DISCONTINUED | OUTPATIENT
Start: 2022-08-10 | End: 2022-08-10

## 2022-08-10 RX ORDER — FENTANYL CITRATE 0.05 MG/ML
25 INJECTION, SOLUTION INTRAMUSCULAR; INTRAVENOUS EVERY 5 MIN PRN
Status: DISCONTINUED | OUTPATIENT
Start: 2022-08-10 | End: 2022-08-10 | Stop reason: HOSPADM

## 2022-08-10 RX ORDER — HYDROMORPHONE HCL IN WATER/PF 6 MG/30 ML
0.2 PATIENT CONTROLLED ANALGESIA SYRINGE INTRAVENOUS EVERY 5 MIN PRN
Status: DISCONTINUED | OUTPATIENT
Start: 2022-08-10 | End: 2022-08-10 | Stop reason: HOSPADM

## 2022-08-10 RX ORDER — LIDOCAINE HYDROCHLORIDE 20 MG/ML
INJECTION, SOLUTION INFILTRATION; PERINEURAL PRN
Status: DISCONTINUED | OUTPATIENT
Start: 2022-08-10 | End: 2022-08-10

## 2022-08-10 RX ORDER — HYDROMORPHONE HCL IN WATER/PF 6 MG/30 ML
0.4 PATIENT CONTROLLED ANALGESIA SYRINGE INTRAVENOUS
Status: DISCONTINUED | OUTPATIENT
Start: 2022-08-10 | End: 2022-08-11 | Stop reason: HOSPADM

## 2022-08-10 RX ORDER — PROPOFOL 10 MG/ML
INJECTION, EMULSION INTRAVENOUS CONTINUOUS PRN
Status: DISCONTINUED | OUTPATIENT
Start: 2022-08-10 | End: 2022-08-10

## 2022-08-10 RX ORDER — SODIUM CHLORIDE, SODIUM LACTATE, POTASSIUM CHLORIDE, CALCIUM CHLORIDE 600; 310; 30; 20 MG/100ML; MG/100ML; MG/100ML; MG/100ML
INJECTION, SOLUTION INTRAVENOUS CONTINUOUS
Status: DISCONTINUED | OUTPATIENT
Start: 2022-08-10 | End: 2022-08-10 | Stop reason: HOSPADM

## 2022-08-10 RX ORDER — NEOSTIGMINE METHYLSULFATE 1 MG/ML
VIAL (ML) INJECTION PRN
Status: DISCONTINUED | OUTPATIENT
Start: 2022-08-10 | End: 2022-08-10

## 2022-08-10 RX ORDER — MEPERIDINE HYDROCHLORIDE 25 MG/ML
12.5 INJECTION INTRAMUSCULAR; INTRAVENOUS; SUBCUTANEOUS
Status: DISCONTINUED | OUTPATIENT
Start: 2022-08-10 | End: 2022-08-10 | Stop reason: HOSPADM

## 2022-08-10 RX ORDER — ONDANSETRON 4 MG/1
4 TABLET, ORALLY DISINTEGRATING ORAL EVERY 30 MIN PRN
Status: DISCONTINUED | OUTPATIENT
Start: 2022-08-10 | End: 2022-08-10 | Stop reason: HOSPADM

## 2022-08-10 RX ORDER — DEXTROSE MONOHYDRATE 25 G/50ML
25-50 INJECTION, SOLUTION INTRAVENOUS
Status: DISCONTINUED | OUTPATIENT
Start: 2022-08-10 | End: 2022-08-11 | Stop reason: HOSPADM

## 2022-08-10 RX ORDER — DEXAMETHASONE SODIUM PHOSPHATE 10 MG/ML
4 INJECTION, SOLUTION INTRAMUSCULAR; INTRAVENOUS ONCE
Status: COMPLETED | OUTPATIENT
Start: 2022-08-10 | End: 2022-08-10

## 2022-08-10 RX ORDER — CEFAZOLIN SODIUM/WATER 2 G/20 ML
2 SYRINGE (ML) INTRAVENOUS SEE ADMIN INSTRUCTIONS
Status: DISCONTINUED | OUTPATIENT
Start: 2022-08-10 | End: 2022-08-10 | Stop reason: HOSPADM

## 2022-08-10 RX ORDER — CEFAZOLIN SODIUM 1 G/3ML
1 INJECTION, POWDER, FOR SOLUTION INTRAMUSCULAR; INTRAVENOUS EVERY 8 HOURS
Status: COMPLETED | OUTPATIENT
Start: 2022-08-10 | End: 2022-08-11

## 2022-08-10 RX ADMIN — PROPOFOL 160 MG: 10 INJECTION, EMULSION INTRAVENOUS at 11:17

## 2022-08-10 RX ADMIN — FENTANYL CITRATE 25 MCG: 50 INJECTION, SOLUTION INTRAMUSCULAR; INTRAVENOUS at 15:19

## 2022-08-10 RX ADMIN — SODIUM CHLORIDE 7 UNITS: 9 INJECTION, SOLUTION INTRAVENOUS at 10:09

## 2022-08-10 RX ADMIN — SODIUM CHLORIDE, POTASSIUM CHLORIDE, SODIUM LACTATE AND CALCIUM CHLORIDE: 600; 310; 30; 20 INJECTION, SOLUTION INTRAVENOUS at 11:25

## 2022-08-10 RX ADMIN — FENTANYL CITRATE 100 MCG: 50 INJECTION, SOLUTION INTRAMUSCULAR; INTRAVENOUS at 11:13

## 2022-08-10 RX ADMIN — PROPOFOL 30 MCG/KG/MIN: 10 INJECTION, EMULSION INTRAVENOUS at 11:42

## 2022-08-10 RX ADMIN — NEOSTIGMINE METHYLSULFATE 3.5 MG: 1 INJECTION, SOLUTION INTRAVENOUS at 14:26

## 2022-08-10 RX ADMIN — CEFAZOLIN 1 G: 1 INJECTION, POWDER, FOR SOLUTION INTRAMUSCULAR; INTRAVENOUS at 19:26

## 2022-08-10 RX ADMIN — DEXAMETHASONE SODIUM PHOSPHATE 4 MG: 10 INJECTION, SOLUTION INTRAMUSCULAR; INTRAVENOUS at 11:25

## 2022-08-10 RX ADMIN — Medication 5 MG: at 12:47

## 2022-08-10 RX ADMIN — ROCURONIUM BROMIDE 50 MG: 50 INJECTION, SOLUTION INTRAVENOUS at 11:17

## 2022-08-10 RX ADMIN — Medication 5 MG: at 13:11

## 2022-08-10 RX ADMIN — SODIUM CHLORIDE 5 UNITS: 9 INJECTION, SOLUTION INTRAVENOUS at 09:31

## 2022-08-10 RX ADMIN — HYDROMORPHONE HYDROCHLORIDE 0.2 MG: 0.2 INJECTION, SOLUTION INTRAMUSCULAR; INTRAVENOUS; SUBCUTANEOUS at 15:36

## 2022-08-10 RX ADMIN — ROCURONIUM BROMIDE 20 MG: 50 INJECTION, SOLUTION INTRAVENOUS at 11:50

## 2022-08-10 RX ADMIN — ROCURONIUM BROMIDE 10 MG: 50 INJECTION, SOLUTION INTRAVENOUS at 13:46

## 2022-08-10 RX ADMIN — HYDROMORPHONE HYDROCHLORIDE 0.2 MG: 0.2 INJECTION, SOLUTION INTRAMUSCULAR; INTRAVENOUS; SUBCUTANEOUS at 15:42

## 2022-08-10 RX ADMIN — DEXMEDETOMIDINE HYDROCHLORIDE 0.3 MCG/KG/HR: 100 INJECTION, SOLUTION INTRAVENOUS at 11:25

## 2022-08-10 RX ADMIN — Medication 5 MG: at 11:25

## 2022-08-10 RX ADMIN — FENTANYL CITRATE 25 MCG: 50 INJECTION, SOLUTION INTRAMUSCULAR; INTRAVENOUS at 15:14

## 2022-08-10 RX ADMIN — SODIUM CHLORIDE, POTASSIUM CHLORIDE, SODIUM LACTATE AND CALCIUM CHLORIDE: 600; 310; 30; 20 INJECTION, SOLUTION INTRAVENOUS at 10:11

## 2022-08-10 RX ADMIN — Medication 7.5 MG: at 11:35

## 2022-08-10 RX ADMIN — INSULIN HUMAN 20 UNITS: 100 INJECTION, SUSPENSION SUBCUTANEOUS at 20:35

## 2022-08-10 RX ADMIN — ROCURONIUM BROMIDE 20 MG: 50 INJECTION, SOLUTION INTRAVENOUS at 13:17

## 2022-08-10 RX ADMIN — HYDROMORPHONE HYDROCHLORIDE 0.2 MG: 0.2 INJECTION, SOLUTION INTRAMUSCULAR; INTRAVENOUS; SUBCUTANEOUS at 19:27

## 2022-08-10 RX ADMIN — Medication 5 MG: at 11:50

## 2022-08-10 RX ADMIN — LIDOCAINE HYDROCHLORIDE 100 MG: 20 INJECTION, SOLUTION INFILTRATION; PERINEURAL at 11:13

## 2022-08-10 RX ADMIN — ONDANSETRON 4 MG: 2 INJECTION INTRAMUSCULAR; INTRAVENOUS at 14:13

## 2022-08-10 RX ADMIN — ROCURONIUM BROMIDE 30 MG: 50 INJECTION, SOLUTION INTRAVENOUS at 12:16

## 2022-08-10 RX ADMIN — Medication 2 G: at 11:20

## 2022-08-10 RX ADMIN — INSULIN ASPART 5 UNITS: 100 INJECTION, SOLUTION INTRAVENOUS; SUBCUTANEOUS at 16:57

## 2022-08-10 RX ADMIN — HYDROMORPHONE HYDROCHLORIDE 0.2 MG: 0.2 INJECTION, SOLUTION INTRAMUSCULAR; INTRAVENOUS; SUBCUTANEOUS at 16:46

## 2022-08-10 RX ADMIN — SODIUM CHLORIDE: 9 INJECTION, SOLUTION INTRAVENOUS at 16:50

## 2022-08-10 RX ADMIN — GLYCOPYRROLATE 0.6 MG: 0.2 INJECTION, SOLUTION INTRAMUSCULAR; INTRAVENOUS at 14:26

## 2022-08-10 RX ADMIN — MIDAZOLAM 2 MG: 1 INJECTION INTRAMUSCULAR; INTRAVENOUS at 11:10

## 2022-08-10 ASSESSMENT — ACTIVITIES OF DAILY LIVING (ADL)
ADLS_ACUITY_SCORE: 20
ADLS_ACUITY_SCORE: 21
ADLS_ACUITY_SCORE: 21
ADLS_ACUITY_SCORE: 20
ADLS_ACUITY_SCORE: 20
ADLS_ACUITY_SCORE: 21
ADLS_ACUITY_SCORE: 20
ADLS_ACUITY_SCORE: 35

## 2022-08-10 NOTE — PROVIDER NOTIFICATION
MD Notification    Notified Person: MD    Notified Person Name: Aaliyah    Notification Date/Time: 8/10/22 0829    Notification Interaction: Text Page    Purpose of Notification: 2414 DN    Is there instruction on brace wearing? There is no order.  Also, insulin due for before meals, pt not eating yet and BG at 227.  Do we need to get a PRN insulin order?  Thanks Lawrence SAL RN *[phone]    Orders Received:     Comments:

## 2022-08-10 NOTE — OP NOTE
Name of procedure: Anterior cervical discectomy and spinal cord decompression at C5-6; anterior cervical discectomy at C6-7; interbody arthrodesis at C5-6 and C6-7 using allograft bone; placement of anterior cervical plate C5-C7; use the operating microscope    Preoperative diagnosis: Intractable left arm pain believed secondary to high-grade proximal foraminal stenosis at left C6-7 from a chronic disc herniation; high-grade cervical spinal stenosis at C5-6 secondary to osteophyte formation    Postoperative diagnosis: Same    Surgeon: Eric Quiroz MD    First Assistant: Janae Hernandez PA-C    Material for the laboratory for examination: None    EBL: 10 cc    Description of the procedure: Patient was brought to the operating room where he is placed under suitable general endotracheal anesthesia and subsequently positioned in the supine position with a roll under his shoulders and his neck slightly extended and his head turned slightly toward the left side.  Right side of his neck was sterilely prepped and draped in usual fashion and a transverse incision approximately 4 cm in length was made centered on the anterior border the sternocleidomastoid muscle level chosen with use of lateral fluoroscopic guidance.  Sharp dissection proceeded through the platysma layer and along the anterior border the sternocleidomastoid muscle, through the middle cervical fascia and directly onto the ventral surface of the cervical vertebra of C5, C6 and C7.  C5-6 level was verified with lateral fluoroscopy and marked with a suture.  Medial edges of the longus Sidra muscle were coagulated and elevated and a 45 mm  retractor was placed along with a Amasa interbody retractor system from C5-C6.  Distraction was initiated across the C5-6 disc space.  The operating microscope was brought into use and used throughout the remainder the procedure for visualization, illumination and microsurgical technique.    Rather large anterior  osteophyte at C5-6 was reduced using a Midas Doyle drill.  Markedly degenerated and nearly collapsed disc space at C5-6 with slightly open and the adjacent calcified endplates were decorticated to permit deep exposure.  The posterior margin bridging osteophytes were discovered drilled to a thin shell and removed with a variety of curettes and rongeurs.  Posterior longitudinal ligament was also opened from foramen to foramen and bilateral foraminotomies were performed.  Ventral aspect of the dural envelope was decompressed from these osteophytes.  At the conclusion of the procedure there did not appear to be significant residual neurologic impingement at the C5-6 level.  Adjacent cortical endplates were slightly decorticated and an 8 x 14 x 14 mm cornerstone L ASR graft was gently impacted into the intervertebral space.  Attention was then directed downward to the C6-7 level where essentially the same technical procedure was performed after removing the retractors.  At this level chronic appearing disc herniation extending into the left C6-7 neural foramen was uncovered and removed.  There were fragments of endplate disc into the foramen significantly compressing the exiting left C7 nerve root.  These were removed to the extent possible and a good foraminotomies was performed.  Posterior longitudinal ligament was opened from foramen to foramen and once again an 8 x 14 x 14 mm L ASR cornerstone graft was sized and gently impacted into the intervertebral space.  All anterior osteophytes were then reduced 37.5 mm dynamic Medtronic titanium plate was selected sized and secured to the ventral aspect of C5, C6 and C7 using 614 mm unicortical titanium screws placed in predrilled openings.  Final fluoroscopic review showed satisfactory placement of the instrumentation which appears stable.  Center locking knobs were secured and pushpins were removed.  Retropharyngeal space was then copiously irrigated with Ancef containing  irrigation and meticulous hemostasis was assured using bipolar cautery.  A small amount of thrombin was left in the retropharyngeal space at the conclusion of the procedure.  Platysma layer and subcutaneous tissue were then closed in layers using interrupted inverted 3-0 Vicryl suture with a running undyed 4-0 Vicryl in a subcuticular stitch for skin.  Sterile dressing was then applied and patient was placed in cervical collar, awakened extubated and transported to recovery room in stable condition.    Mr. Hernandez was present throughout the entire procedure.  His assistance in retracting and protecting vital structures during the course of the decompression and subsequent instrumentation was invaluable.

## 2022-08-10 NOTE — CONSULTS
[facility]  Consult Note - Hospitalist Service     Date of Admission:  8/10/2022  Consult Requested by: Yulisa Fischer PA-C  Reason for Consult: Type 1 Diabetes management following anterior cervical discectomy and fusion (C5-C7)  PRIMARY CARE PROVIDER:    [facility], [facility]    Assessment & Plan   Shaq Ny is a 62 year old male admitted on 8/10/2022.    Past medical history significant for Cervical spine stenosis with left sided radiculopathy, DM1, History of nephrectomy, Tobacco use D/O who underwent an elective anterior cervical discectomy and fusion (C5-C7).    Cervical spine stenosis with left sided radiculopathy s/p anterior cervical discectomy and fusion (C5-C7).  POD# 0.   - Neurosurgery  is managing.   --Defer analgesic management, DVT prophylaxis, PT/OT.    - HGB check in the morning.    - Encourage utilization of incentive spirometer.     DM1  Hyperglycemia  PTA management with 70/30 NPH 20 units with breakfast and 25 units with supper along with 70/30 NPH sliding scale (6-10 units).  Patient received 4 mg of Dexamethasone intra-op.  He is also currently sedated and does not seem like he will be eating this evening.    *Discussed overall plan with nursing staff.  Glucose of 227 this afternoon and received 5 units of Novolog.  Intra-op glucose at 336 and received 12 units of regular insulin.    - NPH 70/30 20 units every evening with supper and 15 units every morning with breakfast.     --Advised to give supper dose as soon as it is available.   - Glucose checks every 4 hours with high intensity sliding scale Novolog.      History of left total nephrectomy and right partial nephrectomy (age 2)  No interventions.      Tobacco use D/O  Currently smoking 0.5-1 pack per day.    - Nicoderm patch available.    Clinically Significant Risk Factors Present on Admission                            Diet: Advance Diet as Tolerated: Regular Diet Adult     DVT Prophylaxis:  Defer to primary service   Francisco Catheter: Not present  Central Lines: None  Cardiac Monitoring: None  Code Status: Full Code      Disposition Plan    Per Neurosurgery.     The patient's care was discussed with the Bedside Nurse and Patient.    The patient has been discussed with Dr. Byrd, who agrees with the assessment and plan at this time.    At this time, I'd like to thank Yulisa Fischer PA-C for consulting the Hospitalist service.  We will continue follow.        Tomás Franz PA-C  [facility]  Securely message with the Vocera Web Console (learn more here)  Text page via [facility] Paging/Directory    ______________________________________________________________________    Chief Complaint   Elective anterior cervical discectomy and fusion (C5-C7).    History is obtained from the patient and EMR.      History of Present Illness   Shaq Ny is a 62 year old male with a past medical history significant for Cervical spine stenosis with left sided radiculopathy, DM1, History of nephrectomy, Tobacco use D/O who underwent an elective anterior cervical discectomy and fusion (C5-C7).    Patient was seen in his hospital room where he was lying in bed upon arrival.  Patient was groggy and somewhat sedated and at times would fall back asleep making it difficult to obtain answers from questions.  Initially, we reviewed his medical and surgical history.  We also discussed some of his home medication including his diabetes regimen.  Upon questioning patient states he is no longer on lisinopril.    Upon questioning, patient offers no real complaints regarding review of systems questions.  Suspect that this is due to patient's grogginess.    Discussed diabetes management with the patient and then nursing staff.    Review of Systems   The 10 point Review of Systems is negative other than noted in the HPI.    Past Medical History    I have reviewed this patient's medical history and  updated it with pertinent information if needed.   Past Medical History:   Diagnosis Date     Diabetes mellitus (H)     insulin dependent, onset age 30s     History of nephrectomy      Spinal stenosis    Cervical spine stenosis with left sided radiculopathy, DM1, History of nephrectomy, Tobacco use D/O    Past Surgical History   I have reviewed this patient's surgical history and updated it with pertinent information if needed.  Past Surgical History:   Procedure Laterality Date     BLADDER SURGERY      4th grade     KIDNEY SURGERY Left     kidney removed in 4th grade     KIDNEY SURGERY Right     partial removal in 4th grade, no hx of transplants       Social History   I have reviewed this patient's social history and updated it with pertinent information if needed.  Patient resides in a house in [place], [place] with his mother.  He currently smokes 0.5-1 pack per day.  He does not consume alcohol.  He does not use illicit drugs.    Social History     Tobacco Use     Smoking status: Current Every Day Smoker     Packs/day: 1.00     Years: 50.00     Pack years: 50.00     Types: Cigarettes     Smokeless tobacco: Never Used   Substance Use Topics     Alcohol use: Not Currently     Drug use: Never       Family History   I have reviewed this patient's family history and updated it with pertinent information if needed.   Family History   Problem Relation Age of Onset     Coronary Artery Disease Mother      Coronary Artery Disease Father      Coronary Artery Disease Brother        Medications   Prior to Admission Medications   Prescriptions Last Dose Informant Patient Reported? Taking?   acetaminophen (TYLENOL) 500 MG tablet More than a month at PRN Self Yes Yes   Sig: Take 500-1,000 mg by mouth as needed for mild pain   cyclobenzaprine (FLEXERIL) 5 MG tablet More than a month at PRN Self Yes Yes   Sig: Take 5 mg by mouth 3 times daily as needed for muscle spasms   insulin NPH-Regular 70/30 (HUMULIN 70/30;NOVOLIN  70/30) (70-30) 100 UNIT/ML vial 8/9/2022 at Before breakfast Self No Yes   Sig: Inject 25 Units Subcutaneous daily (with breakfast)   Patient taking differently: Inject 20 Units Subcutaneous daily (with breakfast)   insulin NPH-Regular 70/30 (HUMULIN 70/30;NOVOLIN 70/30) (70-30) 100 UNIT/ML vial  at before dinner Self No Yes   Sig: Inject 30 Units Subcutaneous daily (with dinner)   Patient taking differently: Inject 25 Units Subcutaneous daily (with dinner)   insulin NPH-Regular 70/30 (HUMULIN 70/30;NOVOLIN 70/30) (70-30) 100 UNIT/ML vial 8/9/2022 at PRN Self Yes Yes   Sig: Inject 6-10 Units Subcutaneous as needed for high blood sugar (PRN per sliding scale)   insulin aspart (NOVOLOG FLEXPEN) 100 UNIT/ML pen 8/10/2022 at AM Self Yes Yes   Sig: Inject 10 Units Subcutaneous once      Facility-Administered Medications Last Administration Doses Remaining   sodium chloride 0.9% (bag) irrigation 1,000 mL None recorded 1   sodium chloride 0.9% (bag) irrigation 1,000 mL None recorded 1        Allergies   Allergies   Allergen Reactions     Oxycodone Other (See Comments) and Nausea     Mood disturbances        Physical Exam   Vital Signs: Temp: 98.1  F (36.7  C) Temp src: Oral BP: 130/70 Pulse: 79   Resp: 18 SpO2: 97 % O2 Device: Nasal cannula Oxygen Delivery: 2 LPM  Weight: 168 lbs 3.2 oz    Constitutional: Asleep and remained groggy and somewhat sedated throughout encounter.  At times he was more awake and answering questions but primarily continued to doze off.  Otherwise he was cooperative and in no distress.  ENT: Normocephalic, without obvious abnormality, atraumatic, oral pharynx with dry mucus membranes, tonsils without erythema or exudates.  Eyes pupils are equal, round and reactive to light; extra occular movements intact.  Normal sclera.    Neck: Cervical brace in place  Pulmonary: No increased work of breathing, good air exchange, clear to auscultation bilaterally, no crackles or wheezing.  Cardiovascular: Regular  rate and rhythm, normal S1 and S2, no S3 or S4, and no murmur noted.  GI: Hypoactive bowel sounds, soft, non-distended, non-tender.    Skin/Integumen: Visualized skin appeared clear.  Neuro: CN II-XII grossly intact.  Upper and lower extremities strength, coordination and sensation intact bilaterally.    Psych: Difficult to assess due to patient's grogginess and sedation.  Extremities: No lower extremity edema noted, and calves are non-tender to palpation bilaterally.  PCD's in place bilaterally.     Data   I personally reviewed no images or EKG's today.  Results for orders placed or performed during the hospital encounter of 08/10/22 (from the past 24 hour(s))   Glucose by meter   Result Value Ref Range    GLUCOSE BY METER POCT 336 (H) 70 - 99 mg/dL   Glucose by meter   Result Value Ref Range    GLUCOSE BY METER POCT 299 (H) 70 - 99 mg/dL   Glucose by meter   Result Value Ref Range    GLUCOSE BY METER POCT 226 (H) 70 - 99 mg/dL   Glucose by meter   Result Value Ref Range    GLUCOSE BY METER POCT 143 (H) 70 - 99 mg/dL   Glucose by meter   Result Value Ref Range    GLUCOSE BY METER POCT 147 (H) 70 - 99 mg/dL   Glucose by meter   Result Value Ref Range    GLUCOSE BY METER POCT 170 (H) 70 - 99 mg/dL   XR Surgery ANTONIA Fluoro Less Than 5 Min w Stills    Narrative    SURGERY C-ARM FLUOROSCOPY LESS THAN 5 MINUTES WITH STILLS   8/10/2022  2:30 PM     HISTORY: ACD C5-6 and C6-7.    COMPARISON: None.      Impression    IMPRESSION: Fluoroscopic spot images obtained during spinal surgery.  Refer to operative report.     DANYEL MART MD         SYSTEM ID:  [id number]   Glucose by meter   Result Value Ref Range    GLUCOSE BY METER POCT 168 (H) 70 - 99 mg/dL   Glucose by meter   Result Value Ref Range    GLUCOSE BY METER POCT 227 (H) 70 - 99 mg/dL   Glucose by meter   Result Value Ref Range    GLUCOSE BY METER POCT 246 (H) 70 - 99 mg/dL

## 2022-08-10 NOTE — ANESTHESIA PREPROCEDURE EVALUATION
Anesthesia Pre-Procedure Evaluation    Patient: Shaq Ny   MRN: [id number] : 1960        Procedure : Procedure(s):  Anterior cervical discectomy and fusion at cervical 5 to cervical 6 and cervical 6 to cervical 7 for spinal stenosis and left cervical 7 radiculopathy          Past Medical History:   Diagnosis Date     Diabetes mellitus (H)     insulin dependent, onset age 30s     History of nephrectomy      Spinal stenosis       Past Surgical History:   Procedure Laterality Date     BLADDER SURGERY      4th grade     KIDNEY SURGERY Left     kidney removed in 4th grade     KIDNEY SURGERY Right     partial removal in 4th grade, no hx of transplants      Allergies   Allergen Reactions     Oxycodone Other (See Comments) and Nausea     Mood disturbances       Social History     Tobacco Use     Smoking status: Current Every Day Smoker     Packs/day: 1.00     Years: 50.00     Pack years: 50.00     Types: Cigarettes     Smokeless tobacco: Never Used   Substance Use Topics     Alcohol use: Not Currently      Wt Readings from Last 1 Encounters:   08/10/22 76.3 kg (168 lb 3.2 oz)        Anesthesia Evaluation   Pt has had prior anesthetic.     No history of anesthetic complications       ROS/MED HX  ENT/Pulmonary:    (-) sleep apnea   Neurologic:     (+) peripheral neuropathy,  (-) no CVA   Cardiovascular:    (-) CAD   METS/Exercise Tolerance:     Hematologic:       Musculoskeletal:       GI/Hepatic:    (-) GERD   Renal/Genitourinary: Comment: S/p nephrectomy      Endo:     (+) type I DM, Using insulin, Diabetic complications: nephropathy.  (-) Type II DM   Psychiatric/Substance Use:       Infectious Disease:       Malignancy:       Other:            Physical Exam    Airway        Mallampati: II   TM distance: > 3 FB   Neck ROM: full   Mouth opening: > 3 cm    Respiratory Devices and Support         Dental  no notable dental history         Cardiovascular   cardiovascular exam normal          Pulmonary    pulmonary exam normal                OUTSIDE LABS:  CBC:   Lab Results   Component Value Date    WBC 6.4 01/31/2022    WBC 13.5 (H) 01/30/2022    HGB 13.7 01/31/2022    HGB 14.8 01/30/2022    HCT 41.7 01/31/2022    HCT 45.4 01/30/2022     01/31/2022     01/30/2022     BMP:   Lab Results   Component Value Date     (L) 03/07/2022     01/31/2022    POTASSIUM 3.9 03/07/2022    POTASSIUM 4.9 01/31/2022    CHLORIDE 102 03/07/2022    CHLORIDE 105 01/31/2022    CO2 24 03/07/2022    CO2 26 01/31/2022    BUN 16 03/07/2022    BUN 16 01/31/2022    CR 0.98 03/07/2022    CR 1.08 01/31/2022     (H) 03/07/2022     (H) 01/31/2022     COAGS: No results found for: PTT, INR, FIBR  POC:   Lab Results   Component Value Date     (H) 09/04/2009     HEPATIC:   Lab Results   Component Value Date    ALBUMIN 3.6 01/30/2022    PROTTOTAL 6.1 01/30/2022    ALT 14 01/30/2022    AST 17 01/30/2022    ALKPHOS 74 01/30/2022    BILITOTAL 0.7 01/30/2022     OTHER:   Lab Results   Component Value Date    A1C 9.4 (H) 01/30/2022    DIONISIO 9.2 03/07/2022    LIPASE <9 01/30/2022    TSH 2.37 01/30/2022    CRP 0.2 01/30/2022       Anesthesia Plan    ASA Status:  2   NPO Status:  NPO Appropriate    Anesthesia Type: General.     - Airway: ETT   Induction: Propofol, Intravenous.   Maintenance: Balanced.   Techniques and Equipment:       - Drips/Meds: Dexmed. infusion     Consents    Anesthesia Plan(s) and associated risks, benefits, and realistic alternatives discussed. Questions answered and patient/representative(s) expressed understanding.    - Discussed:     - Discussed with:  Patient         Postoperative Care    Pain management: Multi-modal analgesia.   PONV prophylaxis: Ondansetron (or other 5HT-3), Dexamethasone or Solumedrol     Comments:                Kassandra Reyes MD, MD

## 2022-08-10 NOTE — OR NURSING
Dr Quiroz at bedside to see pt.  Received signout from Dr. Reyes.  Report called to Rn on ortho spine unit. Pt escorted to floor on cart with chart and 1 belongings bag.  Family notified of transfer.

## 2022-08-10 NOTE — ANESTHESIA CARE TRANSFER NOTE
Patient: Shaq Ny    Procedure: Procedure(s):  Anterior cervical discectomy and fusion at cervical 5 to cervical 6 and cervical 6 to cervical 7 for spinal stenosis and left cervical 7 radiculopathy       Diagnosis: Spinal stenosis in cervical region [M48.02]  Cervical radiculopathy [M54.12]  Diagnosis Additional Information: No value filed.    Anesthesia Type:   General     Note:    Oropharynx: oropharynx clear of all foreign objects and spontaneously breathing  Level of Consciousness: drowsy  Oxygen Supplementation: face mask  Level of Supplemental Oxygen (L/min / FiO2): 6  Independent Airway: airway patency satisfactory and stable  Dentition: dentition unchanged  Vital Signs Stable: post-procedure vital signs reviewed and stable  Report to RN Given: handoff report given  Patient transferred to: PACU  Comments: Smooth extubation in OR with minimal coughing. 6L SFM 02 to PACU.   Handoff Report: Identifed the Patient, Identified the Reponsible Provider, Reviewed the pertinent medical history, Discussed the surgical course, Reviewed Intra-OP anesthesia mangement and issues during anesthesia, Set expectations for post-procedure period and Allowed opportunity for questions and acknowledgement of understanding      Vitals:  Vitals Value Taken Time   /74 08/10/22 1443   Temp     Pulse 73 08/10/22 1446   Resp 18 08/10/22 1446   SpO2 97 % 08/10/22 1446   Vitals shown include unvalidated device data.    Electronically Signed By: VIOLETA Fregoso CRNA  August 10, 2022  2:47 PM

## 2022-08-10 NOTE — ANESTHESIA PROCEDURE NOTES
Airway       Patient location during procedure: OR       Procedure Start/Stop Times: 8/10/2022 11:17 AM  Staff -        Anesthesiologist:  Kassandra Reyes MD       CRNA: Karen Su APRN CRNA       Other Anesthesia Staff: Luh Estrada       Performed By: SRNA  Consent for Airway        Urgency: elective  Indications and Patient Condition       Indications for airway management: keny-procedural       Induction type:intravenous       Mask difficulty assessment: 1 - vent by mask    Final Airway Details       Final airway type: endotracheal airway       Successful airway: ETT - single  Endotracheal Airway Details        ETT size (mm): 8.0       Cuffed: yes       Cuff volume (mL): 7       Successful intubation technique: video laryngoscopy       VL Blade Size: Glidescope 4       Grade View of Cords: 1       Adjucts: stylet       Position: Left       Measured from: lips       Secured at (cm): 22       Bite block used: None    Post intubation assessment        Placement verified by: capnometry, equal breath sounds and chest rise        Number of attempts at approach: 1       Number of other approaches attempted: 0       Secured with: pink tape       Ease of procedure: easy       Dentition: Intact and Unchanged    Medication(s) Administered   Medication Administration Time: 8/10/2022 11:17 AM

## 2022-08-10 NOTE — PROGRESS NOTES
Dr. Reyes notified of blood sugar of 336. 5 units of IV insulin ordered, will re-check blood sugar.     Blood sugar recheck: 299, see MAR for orders.    Blood sugar recheck 226, no new orders.

## 2022-08-10 NOTE — ANESTHESIA POSTPROCEDURE EVALUATION
Patient: Shaq Ny    Procedure: Procedure(s):  Anterior cervical discectomy and fusion at cervical 5 to cervical 6 and cervical 6 to cervical 7 for spinal stenosis and left cervical 7 radiculopathy       Anesthesia Type:  General    Note:     Postop Pain Control: Uneventful            Sign Out: Well controlled pain   PONV: No   Neuro/Psych: Uneventful            Sign Out: Acceptable/Baseline neuro status   Airway/Respiratory: Uneventful            Sign Out: Acceptable/Baseline resp. status   CV/Hemodynamics: Uneventful            Sign Out: Acceptable CV status; No obvious hypovolemia; No obvious fluid overload   Other NRE: NONE   DID A NON-ROUTINE EVENT OCCUR? No           Last vitals:  Vitals Value Taken Time   /74 08/10/22 1443   Temp     Pulse 73 08/10/22 1447   Resp 20 08/10/22 1447   SpO2 97 % 08/10/22 1447   Vitals shown include unvalidated device data.    Electronically Signed By: Kassandra Reyes MD, MD  August 10, 2022  2:49 PM

## 2022-08-11 VITALS
HEIGHT: 71 IN | DIASTOLIC BLOOD PRESSURE: 70 MMHG | SYSTOLIC BLOOD PRESSURE: 144 MMHG | BODY MASS INDEX: 23.55 KG/M2 | WEIGHT: 168.2 LBS | RESPIRATION RATE: 16 BRPM | TEMPERATURE: 98.4 F | OXYGEN SATURATION: 97 % | HEART RATE: 62 BPM

## 2022-08-11 LAB
ALBUMIN SERPL-MCNC: 3.4 G/DL (ref 3.4–5)
ALP SERPL-CCNC: 85 U/L (ref 40–150)
ALT SERPL W P-5'-P-CCNC: 23 U/L (ref 0–70)
ANION GAP SERPL CALCULATED.3IONS-SCNC: 5 MMOL/L (ref 3–14)
AST SERPL W P-5'-P-CCNC: 23 U/L (ref 0–45)
BILIRUB SERPL-MCNC: 0.7 MG/DL (ref 0.2–1.3)
BUN SERPL-MCNC: 15 MG/DL (ref 7–30)
CALCIUM SERPL-MCNC: 8.8 MG/DL (ref 8.5–10.1)
CHLORIDE BLD-SCNC: 104 MMOL/L (ref 94–109)
CO2 SERPL-SCNC: 30 MMOL/L (ref 20–32)
CREAT SERPL-MCNC: 0.83 MG/DL (ref 0.66–1.25)
ERYTHROCYTE [DISTWIDTH] IN BLOOD BY AUTOMATED COUNT: 12.8 % (ref 10–15)
GFR SERPL CREATININE-BSD FRML MDRD: >90 ML/MIN/1.73M2
GLUCOSE BLD-MCNC: 169 MG/DL (ref 70–99)
GLUCOSE BLDC GLUCOMTR-MCNC: 107 MG/DL (ref 70–99)
GLUCOSE BLDC GLUCOMTR-MCNC: 127 MG/DL (ref 70–99)
GLUCOSE BLDC GLUCOMTR-MCNC: 153 MG/DL (ref 70–99)
GLUCOSE BLDC GLUCOMTR-MCNC: 159 MG/DL (ref 70–99)
GLUCOSE BLDC GLUCOMTR-MCNC: 304 MG/DL (ref 70–99)
GLUCOSE BLDC GLUCOMTR-MCNC: 336 MG/DL (ref 70–99)
HCT VFR BLD AUTO: 44.4 % (ref 40–53)
HGB BLD-MCNC: 14.7 G/DL (ref 13.3–17.7)
MCH RBC QN AUTO: 30.4 PG (ref 26.5–33)
MCHC RBC AUTO-ENTMCNC: 33.1 G/DL (ref 31.5–36.5)
MCV RBC AUTO: 92 FL (ref 78–100)
PLATELET # BLD AUTO: 224 10E3/UL (ref 150–450)
POTASSIUM BLD-SCNC: 4.7 MMOL/L (ref 3.4–5.3)
PROT SERPL-MCNC: 6.9 G/DL (ref 6.8–8.8)
RBC # BLD AUTO: 4.84 10E6/UL (ref 4.4–5.9)
SODIUM SERPL-SCNC: 139 MMOL/L (ref 133–144)
WBC # BLD AUTO: 13.3 10E3/UL (ref 4–11)

## 2022-08-11 PROCEDURE — 80053 COMPREHEN METABOLIC PANEL: CPT | Performed by: HOSPITALIST

## 2022-08-11 PROCEDURE — 250N000012 HC RX MED GY IP 250 OP 636 PS 637: Performed by: PHYSICIAN ASSISTANT

## 2022-08-11 PROCEDURE — 250N000013 HC RX MED GY IP 250 OP 250 PS 637: Performed by: PHYSICIAN ASSISTANT

## 2022-08-11 PROCEDURE — 85014 HEMATOCRIT: CPT | Performed by: HOSPITALIST

## 2022-08-11 PROCEDURE — 99213 OFFICE O/P EST LOW 20 MIN: CPT | Performed by: PHYSICIAN ASSISTANT

## 2022-08-11 PROCEDURE — 96372 THER/PROPH/DIAG INJ SC/IM: CPT | Performed by: PHYSICIAN ASSISTANT

## 2022-08-11 PROCEDURE — 36415 COLL VENOUS BLD VENIPUNCTURE: CPT | Performed by: HOSPITALIST

## 2022-08-11 PROCEDURE — 250N000011 HC RX IP 250 OP 636: Performed by: PHYSICIAN ASSISTANT

## 2022-08-11 PROCEDURE — 82962 GLUCOSE BLOOD TEST: CPT

## 2022-08-11 RX ORDER — LIDOCAINE HYDROCHLORIDE 20 MG/ML
JELLY TOPICAL ONCE
Status: DISCONTINUED | OUTPATIENT
Start: 2022-08-11 | End: 2022-08-11 | Stop reason: HOSPADM

## 2022-08-11 RX ORDER — AMOXICILLIN 250 MG
1-2 CAPSULE ORAL 2 TIMES DAILY
Qty: 30 TABLET | Refills: 0 | Status: SHIPPED | OUTPATIENT
Start: 2022-08-11 | End: 2023-12-20

## 2022-08-11 RX ORDER — TRAMADOL HYDROCHLORIDE 50 MG/1
50 TABLET ORAL EVERY 6 HOURS PRN
Qty: 30 TABLET | Refills: 0 | Status: SHIPPED | OUTPATIENT
Start: 2022-08-11 | End: 2022-08-25

## 2022-08-11 RX ADMIN — HYDROMORPHONE HYDROCHLORIDE 0.2 MG: 0.2 INJECTION, SOLUTION INTRAMUSCULAR; INTRAVENOUS; SUBCUTANEOUS at 00:33

## 2022-08-11 RX ADMIN — HYDROMORPHONE HYDROCHLORIDE 0.2 MG: 0.2 INJECTION, SOLUTION INTRAMUSCULAR; INTRAVENOUS; SUBCUTANEOUS at 04:30

## 2022-08-11 RX ADMIN — TRAMADOL HYDROCHLORIDE 50 MG: 50 TABLET, COATED ORAL at 09:03

## 2022-08-11 RX ADMIN — INSULIN ASPART 15 UNITS: 100 INJECTION, SUSPENSION SUBCUTANEOUS at 10:50

## 2022-08-11 RX ADMIN — CEFAZOLIN 1 G: 1 INJECTION, POWDER, FOR SOLUTION INTRAMUSCULAR; INTRAVENOUS at 04:15

## 2022-08-11 ASSESSMENT — ACTIVITIES OF DAILY LIVING (ADL)
ADLS_ACUITY_SCORE: 21

## 2022-08-11 NOTE — PROVIDER NOTIFICATION
MD Notification    Notified Person: MD    Notified Person Name: Ana    Notification Date/Time: 8/10/22 1310    Notification Interaction: Text Page    Purpose of Notification: 2414 DN    No orders for bladder management protocol, patient has not voided, bladder scan at 714.  Can we get orders to straight cath.  Thanks Lawrence SAL RN *[phone]    Orders Received:    Comments:

## 2022-08-11 NOTE — PROGRESS NOTES
[facility]    Neurosurgery  Daily Post-Op Note    Assessment & Plan   Procedure(s):  Anterior cervical discectomy and fusion at cervical 5 to cervical 6 and cervical 6 to cervical 7 for spinal stenosis and left cervical 7 radiculopathy   1 Day Post-Op  Doing well.  Pain well-controlled.  Some residual n/t in left hand but already feels like he has better strength in left arm.   Sai MAKI is not fitting him well, will order Clever.     Plan:  -Advance activity as tolerated  -Continue supportive and symptomatic treatment  -appreciate Medicine    Keron Titus PA-C    Interval History   Stable.  Doing well.  Improving slowly.  Pain is reasonably controlled.  No fevers.     Physical Exam   Temp: 98.4  F (36.9  C) Temp src: Oral BP: (!) 144/70 Pulse: 62   Resp: 16 SpO2: 97 % O2 Device: None (Room air) Oxygen Delivery: 2 LPM  Vitals:    08/10/22 0841   Weight: 76.3 kg (168 lb 3.2 oz)     Vital Signs with Ranges  Temp:  [97  F (36.1  C)-98.4  F (36.9  C)] 98.4  F (36.9  C)  Pulse:  [62-79] 62  Resp:  [14-18] 16  BP: (130-154)/(67-74) 144/70  SpO2:  [93 %-99 %] 97 %  I/O last 3 completed shifts:  In: 1400 [I.V.:1400]  Out: 2295 [Urine:2275; Blood:20]    Alert and oriented.  Moves all extremities equally.  Reflexes symmetrical.     Incision: CDI      Medications     sodium chloride 75 mL/hr at 08/10/22 1650        insulin aspart  1-12 Units Subcutaneous Q4H     insulin aspart prot & aspart  15 Units Subcutaneous QAM AC     insulin NPH-Regular  20 Units Subcutaneous Daily with supper     lidocaine   Urethral Once     nicotine  1 patch Transdermal Daily     nicotine   Transdermal Q8H     sodium chloride (PF)  3 mL Intracatheter Q8H           Keron Titus PA-C  [facility]  [address]  [address]  [place] [address]    Tel [phone]  Pager [phone]

## 2022-08-11 NOTE — PLAN OF CARE
Up SBA. A&Ox4. VSS RA. CMS/neuros intact. Dressing CDI. Neck brace on all times. Pain managed with prn dilaudid. Voiding in urinal 350, 425, 100. Straight cath >350. Refusing straight cath at this time. Educated patient about urinary retention. Patient is asymptomatic. Denies bladder discomfort. Writer did page hospitalist for lidocaine jelly. Pt is encouraged to keep voiding. Tolerating regular diet. , 107. Continue to monitor voiding output.

## 2022-08-11 NOTE — PROGRESS NOTES
A call was placed earlier to remind orthosis to replace cervical collar. However no orthosis team member came up to fit patient. Patient refused to wait any longer for replacement of neck brace by the Orthosis team.    Discharge instructions reviewed with patient. Home safety tips reviewed with patient. All personal belongings accounted for. Narcotic pain med and stool softener sent home with the patient.

## 2022-08-11 NOTE — PLAN OF CARE
Shift Summary [id number]    Admitting Diagnosis: Spinal stenosis in cervical region [M48.02]  Cervical radiculopathy [M54.12]   Vitals VSS   Pain 2-3/10. Taking IV Dilaudid PRN. Last dose 1930  A&Ox4  Voiding Straight cath to be done  Mobility SBA  Neuro intact  CMS intact  Lung Sounds clear on 2L via NC  GI NO BM  Dressing CDI    Orders Placed This Encounter      Advance Diet as Tolerated: Regular Diet Adult       Plan:

## 2022-08-11 NOTE — PROGRESS NOTES
[facility]    Medicine History and Physical - Hospitalist Service       Date of Admission:  8/10/2022    Assessment & Plan   Shaq Ny is a 62 year old male admitted on 8/10/2022. PMHx Cervical spine stenosis with left sided radiculopathy, DM1, History of nephrectomy, Tobacco use D/O who underwent an elective anterior cervical discectomy and fusion (C5-C7).     Cervical spine stenosis with left sided radiculopathy s/p C5-C7 anterior cervical discectomy and fusion (8/11/2022).  - Post op mngt per neurosurgery.    - Encourage utilization of incentive spirometer.   - OK from medical standpoint for discharge.    Elevated blood pressure.  No hx HTN. SBPs 140s.  - Monitor and record outpatient; follow-up with PCP if persistently >130 systolic. Discussed and entered in discharge paperwork.     Hyperglycemia in DM1. - improving.  PTA management with 70/30 NPH 20 units with breakfast and 25 units with supper along with 70/30 NPH sliding scale (6-10 units).  Patient received 4 mg of Dexamethasone intra-op.  He is also currently sedated and does not seem like he will be eating this evening.    *Discussed overall plan with nursing staff.  Glucose of 227 this afternoon and received 5 units of Novolog.  Intra-op glucose at 336 and received 12 units of regular insulin.    - Cont PTA regimen at discharge.     Hx left total nephrectomy and right partial nephrectomy (age 2).  No interventions.       Tobacco use D/O  Currently smoking 0.5-1 pack per day.    - Nicoderm patch available.    Clinically Significant Risk Factors Present on Admission                            Diet: Regular Diet Adult    Francisco Catheter: Not present    DVT Prophylaxis: Pneumatic Compression Devices  Code Status: Full Code         The patient's care was discussed with the Attending Physician, Dr. Kessler and Patient.    JoAnna K. Barthell, PA-C  Hospitalist Service  [facility]  Hospital    ______________________________________________________________________    Interval History   Pain controlled. Passing flatus. Feels like emptying bladder completely now, some retention last night and straight cathed. SBPs mildly elevated.    Data reviewed today: I reviewed all medications, new labs and imaging results over the last 24 hours.    Physical Exam   Vital Signs: Temp: 98.4  F (36.9  C) Temp src: Oral BP: (!) 144/70 Pulse: 62   Resp: 16 SpO2: 97 % O2 Device: None (Room air) Oxygen Delivery: 2 LPM  Weight: 168 lbs 3.2 oz  Constitutional: Appears stated age, no acute distress. Upright side of bed eating breakfast. Cervical collar in place.  Respiratory: No increased work of breathing.  Skin: No rashes or lesions on exposed skin.    Medications     sodium chloride 75 mL/hr at 08/10/22 1650       insulin aspart  1-12 Units Subcutaneous Q4H     insulin aspart prot & aspart  15 Units Subcutaneous QAM AC     insulin NPH-Regular  20 Units Subcutaneous Daily with supper     lidocaine   Urethral Once     nicotine  1 patch Transdermal Daily     nicotine   Transdermal Q8H     sodium chloride (PF)  3 mL Intracatheter Q8H       Data   Recent Labs   Lab 08/11/22  0716 08/11/22  0636 08/11/22  0418   WBC 13.3*  --   --    HGB 14.7  --   --    MCV 92  --   --      --   --      --   --    POTASSIUM 4.7  --   --    CHLORIDE 104  --   --    CO2 30  --   --    BUN 15  --   --    CR 0.83  --   --    ANIONGAP 5  --   --    DIONISIO 8.8  --   --    * 159* 107*   ALBUMIN 3.4  --   --    PROTTOTAL 6.9  --   --    BILITOTAL 0.7  --   --    ALKPHOS 85  --   --    ALT 23  --   --    AST 23  --   --        Imaging:  Recent Results (from the past 24 hour(s))   XR Surgery ANTONIA Fluoro Less Than 5 Min w Stills    Narrative    SURGERY C-ARM FLUOROSCOPY LESS THAN 5 MINUTES WITH STILLS   8/10/2022  2:30 PM     HISTORY: ACD C5-6 and C6-7.    COMPARISON: None.      Impression    IMPRESSION: Fluoroscopic spot images  obtained during spinal surgery.  Refer to operative report.     DANYEL MART MD         SYSTEM ID:  [id number]

## 2022-08-15 ENCOUNTER — TELEPHONE (OUTPATIENT)
Dept: NEUROSURGERY | Facility: CLINIC | Age: 62
End: 2022-08-15

## 2022-08-15 DIAGNOSIS — Z98.1 S/P CERVICAL SPINAL FUSION: Primary | ICD-10-CM

## 2022-08-15 NOTE — TELEPHONE ENCOUNTER
PATIENT NAME: Reji Ny   YOB: 1960   MRN: [id number]  SURGEON: Richie    DATE of SURGERY: 8/10/22  PROCEDURE: C5-6, C6-7 ACDF       FOLLOW-UP PLAN:   Wound Check: 7-10 days     Staples/Sutures Out : n/a   Post Op Visit: 6 weeks   Post-op Provider: ORTEGA on Dr. Quiroz clinic day   DIAGNOSTICS:  XR prior    DISPOSITION: home        ADDITIONAL INSTRUCTIONS FOR MEDICAL STAFF: wound check scheduled for 8/17/22    Dahiana Riley RN

## 2022-08-15 NOTE — TELEPHONE ENCOUNTER
"Returned call to pt who is s/p C5-6, C6-7 ACDF on 8/10/22 with Dr. Quiroz.     He reports difficulty sleeping due to restlessness; he is \"bored\". Reji reports his pain is \"annoying\" but manageable, reports posterior neck pain, denies radicular pain.     Encouraged pt to use OTC sleep aids, taking the medication at least 2 hours apart from narcotic pain medication to prevent excessive sedation. He verbalized understanding and agreement. Will trial this and return call if this does not remedy his restlessness at night.     Dahiana Riley RN   "

## 2022-08-17 ENCOUNTER — ALLIED HEALTH/NURSE VISIT (OUTPATIENT)
Dept: NEUROSURGERY | Facility: CLINIC | Age: 62
End: 2022-08-17
Payer: COMMERCIAL

## 2022-08-17 VITALS — HEART RATE: 68 BPM | RESPIRATION RATE: 18 BRPM | SYSTOLIC BLOOD PRESSURE: 112 MMHG | DIASTOLIC BLOOD PRESSURE: 78 MMHG

## 2022-08-17 DIAGNOSIS — Z98.1 S/P CERVICAL SPINAL FUSION: Primary | ICD-10-CM

## 2022-08-17 PROCEDURE — 99207 PR NO CHARGE NURSE ONLY: CPT

## 2022-08-17 RX ORDER — CYCLOBENZAPRINE HCL 5 MG
5 TABLET ORAL 3 TIMES DAILY PRN
Qty: 30 TABLET | Refills: 0 | Status: SHIPPED | OUTPATIENT
Start: 2022-08-17 | End: 2022-08-25

## 2022-08-17 NOTE — PROGRESS NOTES
Shaq Ny is status post Anterior cervical discectomy and fusion at cervical 5 to cervical 6 and cervical 6 to cervical 7 for spinal stenosis and left cervical 7 radiculopathy on 08/10/2022 with Dr. Quiroz.  Today he returns in follow up for a wound check. He is pleased with his outcome - reports a resolved arm pain, denies sensory or motor issues in UE. Notes intermittent ache and stiffness between shoulder blades. Gait and balance are normal. Wears a [place] J collar.  eRx Flexeril 5 mg.      Surgical wound WNL - CDI, no signs of infection or skin breakdown.  Incision well-healed: good skin approximation, no redness or visible/palpable edema, no tenderness to palpation.  PT. AF, denies fever, chills or sweats.  Pt. reports that the symptoms are improved from pre-op.    Yudith Bui, RN, CNRN

## 2022-08-25 ENCOUNTER — TELEPHONE (OUTPATIENT)
Dept: NEUROSURGERY | Facility: CLINIC | Age: 62
End: 2022-08-25

## 2022-08-25 ENCOUNTER — MEDICAL CORRESPONDENCE (OUTPATIENT)
Dept: NEUROSURGERY | Facility: CLINIC | Age: 62
End: 2022-08-25

## 2022-08-25 DIAGNOSIS — Z98.1 S/P CERVICAL SPINAL FUSION: ICD-10-CM

## 2022-08-25 DIAGNOSIS — M54.12 CERVICAL RADICULOPATHY: Primary | ICD-10-CM

## 2022-08-25 DIAGNOSIS — M48.02 SPINAL STENOSIS IN CERVICAL REGION: ICD-10-CM

## 2022-08-25 RX ORDER — CYCLOBENZAPRINE HCL 5 MG
5 TABLET ORAL 3 TIMES DAILY PRN
Qty: 30 TABLET | Refills: 0 | Status: SHIPPED | OUTPATIENT
Start: 2022-08-25 | End: 2022-10-28

## 2022-08-25 RX ORDER — LIDOCAINE 36 MG/1
1 PATCH TOPICAL DAILY
Qty: 5 PATCH | Refills: 0 | Status: SHIPPED | OUTPATIENT
Start: 2022-08-25 | End: 2023-12-20

## 2022-08-25 RX ORDER — GABAPENTIN 100 MG/1
100 CAPSULE ORAL 3 TIMES DAILY
Qty: 90 CAPSULE | Refills: 0 | Status: SHIPPED | OUTPATIENT
Start: 2022-08-25 | End: 2023-12-20

## 2022-08-25 RX ORDER — TRAMADOL HYDROCHLORIDE 50 MG/1
50 TABLET ORAL EVERY 6 HOURS PRN
Qty: 30 TABLET | Refills: 0 | Status: SHIPPED | OUTPATIENT
Start: 2022-08-25 | End: 2023-12-20

## 2022-08-25 NOTE — TELEPHONE ENCOUNTER
Patient would like a call back from a nurse as he stated that his (r) arm / shoulder is experiencing pain and numbness.

## 2022-08-25 NOTE — TELEPHONE ENCOUNTER
Refilled. Consider adding lidocaine patches or gabapentin?   Can have XR and appt if he would like as well.

## 2022-08-25 NOTE — TELEPHONE ENCOUNTER
Pt is s/p Anterior cervical discectomy and fusion at cervical 5 to cervical 6 and cervical 6 to cervical 7 for spinal stenosis and left cervical 7 radiculopathy with Dr. Quiroz on 8/10/22.    He called to report 2 days of RUE shoulder and arm pain with arm weakness. He also noted new numbness from the  shoulder to elbow. He cannot pinpoint any event that triggered it, it started in the middle of the night. All symptoms were LUE prior to surgery.     Currently taking: Tramadol Q6 hours, flexeril, and using ice. Recommended adding in 1,000 mg of tylenol TID.     He would like a refill of tramadol and flexeril and a return call. Per : tramadol last filled on 8/12, 28#, 7 day supply     Dahiana Riley RN

## 2022-08-25 NOTE — TELEPHONE ENCOUNTER
After discussion with ORTEGA offered a trial of gabapentin with titration up to 1 capsule TID. Pt previously tolerated this drug well and verbalized agreement to try this again. He also was in agreement to trial pain patches (prescribed according to insurance approval).     He will follow up with us if this is not successful. Will order XR and pt can RTC to see ORTEGA per David if this is the case.     Dahiana Riley RN

## 2022-08-30 ENCOUNTER — HOSPITAL ENCOUNTER (OUTPATIENT)
Dept: RADIOLOGY | Facility: HOSPITAL | Age: 62
Discharge: HOME | End: 2022-08-30
Attending: NEUROLOGICAL SURGERY | Admitting: NEUROLOGICAL SURGERY
Payer: COMMERCIAL

## 2022-08-30 ENCOUNTER — TELEPHONE (OUTPATIENT)
Dept: NEUROSURGERY | Facility: CLINIC | Age: 62
End: 2022-08-30

## 2022-08-30 DIAGNOSIS — Z98.1 S/P CERVICAL SPINAL FUSION: ICD-10-CM

## 2022-08-30 DIAGNOSIS — Z98.1 S/P CERVICAL SPINAL FUSION: Primary | ICD-10-CM

## 2022-08-30 DIAGNOSIS — M54.12 CERVICAL RADICULOPATHY: ICD-10-CM

## 2022-08-30 PROCEDURE — 72040 X-RAY EXAM NECK SPINE 2-3 VW: CPT

## 2022-08-30 NOTE — TELEPHONE ENCOUNTER
Patient calling - still has right arm pain and can not lift it. Patient would like a call back to discuss.

## 2022-08-30 NOTE — TELEPHONE ENCOUNTER
Returned call to pt after ORTEGA reviewed XR. No obvious abnormality on XR. Offered pt an office visit for an exam and to discuss his care plan. He verbalized understanding and agreement. Pt to be seen on 9/2 with ORTEGA when Dr. Quiroz is in clinic.     Dahiana Riley RN

## 2022-08-30 NOTE — TELEPHONE ENCOUNTER
Returned call to pt who reports ongoing RUE weakness which flares up at night, has been the same since last week. He reports that he cannot raise the arm at the shoulder. Reji still has numbness from the shoulder the the elbow.    Gabapentin has helped pain - is taking 100 mg TID, declined increasing any higher at this point. Did not  pain patches.     Will order XR with chart check.     Dahiana Riley RN

## 2022-09-02 ENCOUNTER — OFFICE VISIT (OUTPATIENT)
Dept: NEUROSURGERY | Facility: CLINIC | Age: 62
End: 2022-09-02
Payer: COMMERCIAL

## 2022-09-02 VITALS
WEIGHT: 168 LBS | OXYGEN SATURATION: 98 % | HEIGHT: 71 IN | SYSTOLIC BLOOD PRESSURE: 108 MMHG | BODY MASS INDEX: 23.52 KG/M2 | HEART RATE: 85 BPM | DIASTOLIC BLOOD PRESSURE: 56 MMHG

## 2022-09-02 DIAGNOSIS — G58.9 NERVE PALSY: Primary | ICD-10-CM

## 2022-09-02 PROCEDURE — 99024 POSTOP FOLLOW-UP VISIT: CPT | Performed by: NURSE PRACTITIONER

## 2022-09-02 NOTE — LETTER
9/2/2022         RE: Shaq Christinaharpaljimenajose raul  [address] [address]        Dear Colleague,    Thank you for referring your patient, Shaq Ny, to the [facility]. Please see a copy of my visit note below.    Neurosurgery Follow UP  September 2, 2022    A/P: Shaq Ny Anterior cervical discectomy and spinal cord decompression at C5-6; anterior cervical discectomy at C6-7; interbody arthrodesis at C5-6 and C6-7 using allograft bone; placement of anterior cervical plate C5-C7; 8/10/2022 with Dr Quiroz. XR done earlier this week for patient's report of right arm weakness. No concerns.     1-2 weeks into recovery patient was unable to use right arm which had full mobility prior to surgery. Left arm patient states was weak before surgery and is now fully functional. Right shoulder numb. Numb elbow to shoulder. I believe patient has a C5 nerve palsy. No steroids since surgery. Taking tramadol, gabapentin, flexeril intermittently. Wakes up at night with pain. Discussed taking medications on a regular basis may help with that. Recommend avoiding NSAIDS. Discussed right arm symptoms with Dr Quiroz. No indication for steroids, TENS. Will send to PT/OT and encouraged patient that this should improve with time. 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 He is wearing his collar as directed. Denies swallowing difficulty.     PLAN:   1. Pain control   Tylenol 1000 mg three times a day   Tramadol - can take 3-4 times a day as needed   Gabapentin as directed  Flexeril - can take 5-10 mg three times a day as needed   Ice/Heat     DO NOT TAKE  NSAIDs  for 6 months after fusion surgery, as these medications may delay bone healing  NSAIDS include such drugs as:  Ibuprofen  Motrin  Advil  Aleve  Naproxen       2. Return in two weeks to see Dr Quiroz - no need to repeat XR  3. PT/OT will call you to schedule   4. Likely you have a C5 nerve palsy - this  "should get better with time       HPI: Presented with persistent and increasing left upper extremity radicular symptoms including weakness in left tricep and numbness left C7 dermatome. Spinal stenosis C5-6 without signal change. Focal disc protrusion left C6-7 with severe impingement exiting left C7 nerve root. Failed conservative management.     Physical Exam  /56   Pulse 85   Ht 1.803 m (5' 11\")   Wt 76.2 kg (168 lb)   SpO2 98%   BMI 23.43 kg/m      General: alert, oriented. FOSTER. Speech clear.     Motor: normal bulk and tone    Strength: right arm unable to pronate, cannot flex bicep, cannot lift over head; no upper arm movement but has strong hand grasp.   biceps 0-1/5 right, 5/5 left   Triceps 2/5 right, 5/5 left   Deltoid 0-1/5 right, 5/5 left  Hand grasp 5/5/right, 5/5 left   Hip flexors 5/5 right, 5/5 left   Quadriceps: 5/5 right, 5/5 left   Ankle dorsiflexion: 5/5 right, 5/5 left   Extensor hallicus longus: 5/5 right, 5/5 left   Ankle plantar flexion : 5/5 right, 5/5 left     Sensation: diminished to light touch right arm     Reflexes: no hyperreflexia     Coordination: unable to utilize right arm normally. No issues walking.     Incision: healed     Imaging: Reviewed XR from earlier this week     CHIRAG Jackson-CNP  [facility] Neurosurgery  O: [phone]            Again, thank you for allowing me to participate in the care of your patient.        Sincerely,        VIOLETA Damon CNP    "

## 2022-09-02 NOTE — PROGRESS NOTES
Neurosurgery Follow UP  September 2, 2022    A/P: Shaq Ny Anterior cervical discectomy and spinal cord decompression at C5-6; anterior cervical discectomy at C6-7; interbody arthrodesis at C5-6 and C6-7 using allograft bone; placement of anterior cervical plate C5-C7; 8/10/2022 with Dr Quiroz. XR done earlier this week for patient's report of right arm weakness. No concerns.     1-2 weeks into recovery patient was unable to use right arm which had full mobility prior to surgery. Left arm patient states was weak before surgery and is now fully functional. Right shoulder numb. Numb elbow to shoulder. I believe patient has a C5 nerve palsy. No steroids since surgery. Taking tramadol, gabapentin, flexeril intermittently. Wakes up at night with pain. Discussed taking medications on a regular basis may help with that. Recommend avoiding NSAIDS. Discussed right arm symptoms with Dr Quiroz. No indication for steroids, TENS. Will send to PT/OT and encouraged patient that this should improve with time. 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 He is wearing his collar as directed. Denies swallowing difficulty.     PLAN:   1. Pain control   Tylenol 1000 mg three times a day   Tramadol - can take 3-4 times a day as needed   Gabapentin as directed  Flexeril - can take 5-10 mg three times a day as needed   Ice/Heat     DO NOT TAKE  NSAIDs  for 6 months after fusion surgery, as these medications may delay bone healing  NSAIDS include such drugs as:  Ibuprofen  Motrin  Advil  Aleve  Naproxen       2. Return in two weeks to see Dr Quiroz - no need to repeat XR  3. PT/OT will call you to schedule   4. Likely you have a C5 nerve palsy - this should get better with time       HPI: Presented with persistent and increasing left upper extremity radicular symptoms including weakness in left tricep and numbness left C7 dermatome. Spinal stenosis C5-6 without signal change. Focal disc protrusion  "left C6-7 with severe impingement exiting left C7 nerve root. Failed conservative management.     Physical Exam  /56   Pulse 85   Ht 1.803 m (5' 11\")   Wt 76.2 kg (168 lb)   SpO2 98%   BMI 23.43 kg/m      General: alert, oriented. FOSTER. Speech clear.     Motor: normal bulk and tone    Strength: right arm unable to pronate, cannot flex bicep, cannot lift over head; no upper arm movement but has strong hand grasp.   biceps 0-1/5 right, 5/5 left   Triceps 2/5 right, 5/5 left   Deltoid 0-1/5 right, 5/5 left  Hand grasp 5/5/right, 5/5 left   Hip flexors 5/5 right, 5/5 left   Quadriceps: 5/5 right, 5/5 left   Ankle dorsiflexion: 5/5 right, 5/5 left   Extensor hallicus longus: 5/5 right, 5/5 left   Ankle plantar flexion : 5/5 right, 5/5 left     Sensation: diminished to light touch right arm     Reflexes: no hyperreflexia     Coordination: unable to utilize right arm normally. No issues walking.     Incision: healed     Imaging: Reviewed XR from earlier this week     Chantale Oscar, CHIRAG-CNP  [facility] Neurosurgery  O: [phone]        "

## 2022-09-02 NOTE — PATIENT INSTRUCTIONS
Pain control   Tylenol 1000 mg three times a day   Tramadol - can take 3-4 times a day as needed   Gabapentin as directed  Flexeril - can take 5-10 mg three times a day as needed   Ice/Heat     DO NOT TAKE  NSAIDs  for 6 months after fusion surgery, as these medications may delay bone healing  NSAIDS include such drugs as:  Ibuprofen  Motrin  Advil  Aleve  Naproxen       2. Return in two weeks to see Dr Quiroz - no need to repeat XR  3. PT/OT will call you to schedule   4. Likely you have a C5 nerve palsy - this should get better with time

## 2022-09-13 ENCOUNTER — HOSPITAL ENCOUNTER (OUTPATIENT)
Dept: PHYSICAL THERAPY | Facility: REHABILITATION | Age: 62
Discharge: HOME | End: 2022-09-13
Attending: NURSE PRACTITIONER
Payer: COMMERCIAL

## 2022-09-13 DIAGNOSIS — M48.02 SPINAL STENOSIS IN CERVICAL REGION: ICD-10-CM

## 2022-09-13 DIAGNOSIS — M54.12 CERVICAL RADICULOPATHY: Primary | ICD-10-CM

## 2022-09-13 DIAGNOSIS — G58.9 NERVE PALSY: ICD-10-CM

## 2022-09-13 PROCEDURE — 97110 THERAPEUTIC EXERCISES: CPT | Mod: GP

## 2022-09-13 PROCEDURE — 97161 PT EVAL LOW COMPLEX 20 MIN: CPT | Mod: GP

## 2022-09-13 NOTE — PROGRESS NOTES
[facility]    OUTPATIENT PHYSICAL THERAPY ORTHOPEDIC EVALUATION  PLAN OF TREATMENT FOR OUTPATIENT REHABILITATION  (COMPLETE FOR INITIAL CLAIMS ONLY)  Patient's Last Name, First Name, M.I.  YOB: 1960  Shaq Ny    Provider s Name:  [facility]   Medical Record No.  [id number]   Start of Care Date:  09/13/22   Onset Date:   8/10/22   Type:     _X__PT   ___OT   ___SLP Medical Diagnosis:  G58.9 (ICD-10-CM) - Nerve palsy     PT Diagnosis:  C5 nerve palsy; decreased R shoulder ROM and strength   Visits from SOC:  1      _________________________________________________________________________________  Plan of Treatment/Functional Goals:  ADL retraining, balance training, gait training, joint mobilization, manual therapy, neuromuscular re-education, ROM, strengthening, stretching     Biofeedback, Cryotherapy, Electrical stimulation, Hot packs, TENS, Traction, Ultrasound     Goals  Goal Identifier: HEP  Goal Description: Patient will demonstrate independence with HEP to facilitate improvement in function.  Target Date: 11/22/22    Goal Identifier: R shoulder ROM  Goal Description: Patient will improve R shoulder ROM to  equal the L to improve functional mobility.  Target Date: 11/22/22    Goal Identifier: R shoulder strength  Goal Description: Patient will improve R shoulder strength to at least 4/5 to improve ability to complete ADLs.  Target Date: 11/22/22                       Therapy Frequency:  1 time/week  Predicted Duration of Therapy Intervention:  10 weeks    Scott Parmer, PT                 I CERTIFY THE NEED FOR THESE SERVICES FURNISHED UNDER        THIS PLAN OF TREATMENT AND WHILE UNDER MY CARE     (Physician co-signature of this document indicates review and certification of the therapy plan).                     Certification Date From:  09/13/22    Certification Date To:  11/22/22    Referring Provider:  VIOLETA Jackson CNP    Initial Assessment        See Epic Evaluation Start of Care Date: 09/13/22 09/13/22 0800   General Information   Type of Visit Initial OP Ortho PT Evaluation   Start of Care Date 09/13/22   Referring Physician VIOLETA Jackson CNP   Orders Evaluate and Treat   Date of Order 09/02/22   Certification Required? Yes   Surgical/Medical history reviewed Yes   Precautions/Limitations spinal precautions  (cervical)   General Information Comments s/p Anterior cervical discectomy and spinal cord decompression at C5-6; anterior cervical discectomy at C6-7; interbody arthrodesis at C5-6 and C6-7 using allograft bone; placement of anterior cervical plate C5-C7; 8/10/2022 with Dr Quiroz   Body Part(s)   Body Part(s) Cervical Spine;Shoulder;Elbow/Wrist   Presentation and Etiology   Pertinent history of current problem (include personal factors and/or comorbidities that impact the POC) Patient is s/p cervical surgery on 8/10/22. Mostly left sided symptoms prior to surgery which seem to have resolved. He is now left with significant weakness to the R shoulder. He is unable to lift the R arm over his head. He also has numbness and tingling through the R shoulder and upper arm as well. He has been dx with R C5 palsy. He is R handed. He has been having lots of issues with sleeping as well. He works in [employer], and has been out of work since surgery. He is in a cervical collar currently and will have that for 2-4 more weeks.   Impairments A. Pain;D. Decreased ROM;E. Decreased flexibility;F. Decreased strength and endurance   Functional Limitations perform activities of daily living;perform required work activities   Symptom Location R arm   How/Where did it occur With a fall   Chronicity New   Pain rating (0-10 point scale) Best (/10);Worst (/10)   Best (/10) 2   Worst (/10) 10   Pain quality B. Dull;C. Aching;D. Burning;A. Sharp    Frequency of pain/symptoms B. Intermittent   Pain/symptoms are: Worse during the night   Pain/symptoms exacerbated by G. Certain positions;I. Bending;C. Lifting;D. Carrying;A. Sitting   Pain/symptoms eased by E. Changing positions;F. Certain positions;I. OTC medication(s)   Fall Risk Screen   Fall screen completed by PT   Have you fallen 2 or more times in the past year? No   Have you fallen and had an injury in the past year? No   Is patient a fall risk? No   Abuse Screen (yes response referral indicated)   Feels Unsafe at Home or Work/School no   Feels Threatened by Someone no   Does Anyone Try to Keep You From Having Contact with Others or Doing Things Outside Your Home? no   Physical Signs of Abuse Present no   Cervical Spine   Shoulder Shrug (C2-C4) Strength 5/5 B   Shoulder Abd (C5) Strength trace R; 4/5 L   Shoulder Add (C7) Strength 4/5 B   Shoulder ER (C5, C6) Strength 2/5 R; 4/5 L   Shoulder IR (C5, C6) Strength 5/5 B   Elbow Flexion (C5, C6) Strength 4/5 R; 5/5 L   Elbow Extension (C7) Strength 5/5 B   Wrist Extension (C6) Strength 5/5 B   Wrist Flexion (C7) Strength 5/5 B   Thumb Abd (C8) Strength 5/5 B   Upper Trapezius Flexibility Tightness B   Levator Scapula Flexibility Tightness B   Observation Patient in a cervical collar   Integumentary  well healed incision cervical spine   Posture forward shoulders B   Shoulder Objective Findings   Side (if bilateral, select both right and left) Right;Left   Right Shoulder Flexion AROM 5 deg   Right Shoulder Flexion PROM 145 deg tightness   Left Shoulder Flexion AROM 165 deg   Left Shoulder Flexion PROM 165 deg tightness   Right Shoulder Abduction AROM 0 deg   Right Shoulder Abduction PROM 95 deg tightness   Left Shoulder Abduction AROM 160 deg   Left Shoulder Abduction PROM 110 deg   Right Shoulder ER PROM 85 deg tightness with arm at side   Left Shoulder ER PROM 95 in 90 abd   Left Shoulder IR PROM 50 in 90 abd   Planned Therapy Interventions   Planned  Therapy Interventions ADL retraining;balance training;gait training;joint mobilization;manual therapy;neuromuscular re-education;ROM;strengthening;stretching   Planned Modality Interventions   Planned Modality Interventions Biofeedback;Cryotherapy;Electrical stimulation;Hot packs;TENS;Traction;Ultrasound   Clinical Impression   Criteria for Skilled Therapeutic Interventions Met yes, treatment indicated   PT Diagnosis C5 nerve palsy; decreased R shoulder ROM and strength   Functional limitations due to impairments reaching, lifting, driving, sleeping, dressing, bathing   Clinical Presentation Stable/Uncomplicated   Clinical Decision Making (Complexity) Low complexity   Therapy Frequency 1 time/week   Predicted Duration of Therapy Intervention (days/wks) 10 weeks   Risk & Benefits of therapy have been explained Yes   Patient, Family & other staff in agreement with plan of care Yes   Clinical Impression Comments Patient is a 62 year old male presenting to physical therapy status post cervical surgery on 8/10/22 who has acquired C5 palsy since about 1-2 weeks out from surgery. Functionally, patient is unable to perform any reaching, lifting, dressing, or bathing with the R arm and he is R handed. Prior to surgery, the R side was unaffected. He would benefit from skilled PT services to address limitations noted in the IE.   ORTHO GOALS   PT Ortho Eval Goals 1;2;3;4   Ortho Goal 1   Goal Identifier HEP   Goal Description Patient will demonstrate independence with HEP to facilitate improvement in function.   Target Date 11/22/22   Ortho Goal 2   Goal Identifier R shoulder ROM   Goal Description Patient will improve R shoulder ROM to  equal the L to improve functional mobility.   Target Date 11/22/22   Ortho Goal 3   Goal Identifier R shoulder strength   Goal Description Patient will improve R shoulder strength to at least 4/5 to improve ability to complete ADLs.   Target Date 11/22/22   Total Evaluation Time   PT Eval, Low  Complexity Minutes (43894) 22   Therapy Certification   Certification date from 09/13/22   Certification date to 11/22/22   Medical Diagnosis G58.9 (ICD-10-CM) - Nerve palsy     Scott Parmer, PT, DPT

## 2022-09-15 DIAGNOSIS — M48.02 SPINAL STENOSIS IN CERVICAL REGION: Primary | ICD-10-CM

## 2022-09-15 RX ORDER — CYCLOBENZAPRINE HCL 5 MG
5 TABLET ORAL 3 TIMES DAILY PRN
Qty: 40 TABLET | Refills: 0 | Status: SHIPPED | OUTPATIENT
Start: 2022-09-15 | End: 2022-10-28

## 2022-09-16 NOTE — TELEPHONE ENCOUNTER
09/16/22- Patient calling to see if we can refill his tramadol medication as well. Stated Richie did not order it for him, the ER doctor did but he would like Richie to refill if possible.    Good call back number [phone]  Thank you!

## 2022-09-30 DIAGNOSIS — G58.9 NERVE PALSY: Primary | ICD-10-CM

## 2022-09-30 RX ORDER — GABAPENTIN 100 MG/1
100 CAPSULE ORAL 3 TIMES DAILY
Qty: 90 CAPSULE | Refills: 1 | Status: SHIPPED | OUTPATIENT
Start: 2022-09-30 | End: 2023-12-20

## 2022-10-04 ENCOUNTER — HOSPITAL ENCOUNTER (OUTPATIENT)
Dept: PHYSICAL THERAPY | Facility: REHABILITATION | Age: 62
Discharge: HOME | End: 2022-10-04
Payer: COMMERCIAL

## 2022-10-04 DIAGNOSIS — G58.9 NERVE PALSY: Primary | ICD-10-CM

## 2022-10-04 DIAGNOSIS — M54.12 CERVICAL RADICULOPATHY: ICD-10-CM

## 2022-10-04 PROCEDURE — 97110 THERAPEUTIC EXERCISES: CPT | Mod: GP

## 2022-10-04 NOTE — ADDENDUM NOTE
Encounter addended by: Antonette Chamberlain, PT on: 10/4/2022 4:32 PM   Actions taken: Visit diagnoses modified

## 2022-10-11 ENCOUNTER — HOSPITAL ENCOUNTER (OUTPATIENT)
Dept: MRI IMAGING | Facility: HOSPITAL | Age: 62
Discharge: HOME | End: 2022-10-11
Attending: NEUROLOGICAL SURGERY | Admitting: NEUROLOGICAL SURGERY
Payer: COMMERCIAL

## 2022-10-11 DIAGNOSIS — M54.12 CERVICAL RADICULOPATHY: ICD-10-CM

## 2022-10-11 DIAGNOSIS — Z98.1 S/P CERVICAL SPINAL FUSION: ICD-10-CM

## 2022-10-11 DIAGNOSIS — G58.9 NERVE PALSY: ICD-10-CM

## 2022-10-11 PROCEDURE — 72156 MRI NECK SPINE W/O & W/DYE: CPT

## 2022-10-11 PROCEDURE — A9585 GADOBUTROL INJECTION: HCPCS | Performed by: NEUROLOGICAL SURGERY

## 2022-10-11 PROCEDURE — 255N000002 HC RX 255 OP 636: Performed by: NEUROLOGICAL SURGERY

## 2022-10-11 RX ORDER — GADOBUTROL 604.72 MG/ML
0.1 INJECTION INTRAVENOUS ONCE
Status: COMPLETED | OUTPATIENT
Start: 2022-10-11 | End: 2022-10-11

## 2022-10-11 RX ADMIN — GADOBUTROL 8 ML: 604.72 INJECTION INTRAVENOUS at 11:08

## 2022-10-17 ENCOUNTER — TELEPHONE (OUTPATIENT)
Dept: NEUROSURGERY | Facility: CLINIC | Age: 62
End: 2022-10-17

## 2022-10-17 NOTE — TELEPHONE ENCOUNTER
10/17/22 Patient states that he has completed his MRI.  However, no one has called as far as the result.  Patient would like a call back today regarding MRI.   Best contact is [phone]

## 2022-10-18 NOTE — TELEPHONE ENCOUNTER
"After discussion with [facility] team, returned call to patient and relayed the following: MRI findings support the suspicion of a \"right C5 nerve palsy as he has narrowing around the nerve on the right at C4-5\" We would like to move forward with an EMG as soon as possible and have him return to clinic to discus next steps with Dr. Quiroz on 10/28.     Will reach out the [facility] to see if we can move up EMG from 11/16.     Dahiana Riley RN       "

## 2022-10-19 ENCOUNTER — OFFICE VISIT (OUTPATIENT)
Dept: PHYSICAL MEDICINE AND REHAB | Facility: CLINIC | Age: 62
End: 2022-10-19
Attending: NEUROLOGICAL SURGERY
Payer: COMMERCIAL

## 2022-10-19 DIAGNOSIS — G58.9 NERVE PALSY: ICD-10-CM

## 2022-10-19 DIAGNOSIS — M54.12 CERVICAL RADICULOPATHY: ICD-10-CM

## 2022-10-19 DIAGNOSIS — Z98.1 S/P CERVICAL SPINAL FUSION: ICD-10-CM

## 2022-10-19 PROCEDURE — 95912 NRV CNDJ TEST 11-12 STUDIES: CPT | Performed by: PHYSICAL MEDICINE & REHABILITATION

## 2022-10-19 PROCEDURE — 95886 MUSC TEST DONE W/N TEST COMP: CPT | Mod: RT | Performed by: PHYSICAL MEDICINE & REHABILITATION

## 2022-10-19 NOTE — PROGRESS NOTES
[facility]  [address] [place] [address] [address]  [place] [address]  Office: [phone] Fax: [phone]    Electromyography and Nerve Conduction Study Report        Indication: Patient presents at the request of Dr. Eric Quiroz for right upper extremity EMG. He is status post ACDF C5-6 and C6-7 in August 2022.  He reports right upper extremity numbness tingling and weakness to the deltoid weakness of the shoulder abduction.  Also having progressive weakness of the hands and numbness and tingling in both hands digits 4 and 5 has a history of diabetes mellitus.  He has pain through his right shoulder as well into the tricep bicep.  He is right-handed.  On exam he has atrophy of the right deltoid with 0/5 strength right shoulder abductors, 5 -/5 right elbow flexors, 5/5 right elbow extensors pronators.  4/5 interosseous and 5/5 long finger flexors.  Absent reflexes to the upper extremities bilaterally.  Negative Tami's.        Pt Exam Discussion (Communication Barriers):  Electromyography and nerve conduction testing, including associated discomfort, was discussed with the patient prior to the procedure.  No learning/ communication barriers; patient verbalized understanding of procedure.  Informed consent was obtained.           Pt Assessment:  Testing was successfully completed; patient tolerated testing well.       Blood Thinners: None Skin Temperature: Warmed 31.2                      EMG/NCS  results:     Nerve Conduction Studies  Motor Sites      Amplitude Segment CV Distal Latency F-Latency F-Estimate Temp Comment   Site (mV)  (m/s) (ms) ms ms  C    Right Median (APB) Motor   Wrist 6.1 Wrist-APB  *4.6   23.1    Elbow 5.7 Elbow-Wrist *45 10.1   23.1    Left Ulnar (ADM) Motor   Wrist 3.5   3.3   23.2    Bel Elbow 3.5 Bel Elbow-Wrist 62 7.2   23.2    Ab Elbow 2.8 Ab Elbow-Wrist 59 9.4   23.3    Right Ulnar (ADM) Motor   Wrist 1.16   3.2   23.1    Bel Elbow 0.66 Bel Elbow-Wrist  27 12.3   23.2    Ab Elbow 0.39 Ab Elbow-Wrist 24 19.2   23.2    Right Ulnar (FDI) Motor   Wrist 0.48 Wrist-FDI  *5.0   23.1    Bel Elbow 0.16 Bel Elbow-Wrist 37 11.4   23.1    Elbow *0.11 Elbow-Wrist *39 14.5   23.2      Sensory Sites      Amplitude CV Onset Lat Peak Lat Temp Comment   Site ( V) (m/s) (ms) (ms)  C    Right Median Anti Sensory   Wrist-Dig II *NR *NR *NR *NR 23.3    Right Median-Ulnar Palmar Sensory        Median   Palm-Wrist 8 40 2.0 *2.6 23         Ulnar   Palm-Wrist *NR *NR *NR *NR 23.1    Left Radial Sensory   Forearm-Wrist *18 57 1.75 2.3 23.2    Right Radial Sensory   Forearm-Wrist *14 56 1.80 2.4 23    Left Ulnar Anti Sensory   Wrist-Dig V *NR *NR *NR *NR 23.2    Right Ulnar Anti Sensory   Wrist-Dig V *NR *NR *NR *NR 23        NCS Waveforms:    Motor                Sensory                      Electromyography     Side Muscle Nerve Root Ins Act Fibs Psw Amp Dur Poly Recrt Int Pat Comment   Right Deltoid Axillary C5-6 *Incr *3+ *3+ Nml Nml 0 Nml Nml No MUP Activation   Right Triceps Radial C6-7-8 Nml Nml Nml Nml Nml 0 Nml Nml    Right Biceps2 Musculocut C5-6 Nml Nml Nml Nml Nml 0 Nml Nml    Right BrachioRad2 Radial C5-6 *Incr *2+ *2+ Nml Nml 0 *Reduced Nml    Right PronatorTeres Median C6-7 Nml Nml Nml Nml Nml 0 Nml Nml    Right 1stDorInt Ulnar C8-T1 *Incr *3+ *3+ Nml Nml 0 *Reduced Nml    Right Abd Poll Brev Median C8-T1 Nml Nml Nml Nml Nml 0 Nml Nml    Right Ext Indicis2 Radial (Post Int) C7-8 Nml Nml Nml Nml Nml 0 Nml Nml    Right FlexCarpiUln2 Ulnar C8,T1 Nml Nml Nml Nml Nml 0 Nml Nml    Right Abd Dig Min2 Ulnar C8-T1 Nml Nml Nml Nml Nml 0 *Reduced Nml    Right Cervical Parasp Mid Rami C4-6 Nml Nml Nml             Comment NCS: Abnormal study:    1.  Absent right median and bilateral ulnar SNAPs.  2.  Low amplitude bilateral radial SNAPs.  3.  Prolonged latency right median transcarpal study with significantly low amplitude.  4.  Absent right ulnar transcarpal study.  5.  Borderline latency  right median CMAP with mildly slowed conduction velocity.  6.  Diffusely low amplitude of the right ulnar CMAP to the ADM with significantly diffuse slowed conduction velocity without significant amplitude drop or focal slowing across the elbow.  Significantly low amplitude throughout makes any amplitude drop difficult to evaluate.  7.  Diffusely low amplitude right ulnar CMAP to the FDI without amplitude drop or slowing across the elbow.  8.  Diffusely low amplitude of the left ulnar CMAP to the ADM which is 50% of normal without amplitude drop or slowing across the elbow.     Comment EMG: Abnormal study.  1.  Increased insertional activity with positive waves and fibrillation potentials of the right deltoid with no motor unit potential activation.  Increased insertional activity with positive waves and  denervation potentials right brachioradialis with reduced recruitment.  There is also increased insertional activity with denervation potentials of the right first dorsal interosseous and reduced recruitment of the first dorsal interosseous and abductor digiti minimi.  Remainder right upper extremity needle EMG normal.    Interpretation: Abnormal study: There is electrodiagnostic evidence of:    1.  Right C5 and/or C6 radiculopathy, active.  C6 involvement would be less likely given normal needle EMG to the bicep and pronator teres.     2.  Right ulnar neuropathy not localizable.  Due to the degree of axonal loss to the first dorsal interosseous and abductor digit he minimi, this is very difficult to localize and appears severe.  This localizes distal to the takeoff of the flexor carpi ulnaris on needle EMG.  C8/T1 radiculopathy would be less likely given needle EMG abnormalities are only noted in the ulnar innervated C8/T1 muscles.      3.  I cannot completely exclude a relatively mild concomitant median neuropathy at the wrist consistent with entrapment in the carpal tunnel.    4.  There are diffuse changes noted  on nerve conduction study which are consistent with but not confirmatory for a sensorimotor peripheral polyneuropathy.  This would be consistent with his history of diabetes mellitus.       The testing was completed in its entirety by the physician.      It was our pleasure caring for your patient today, if there any questions or concerns please do not hesitate to contact us.

## 2022-10-19 NOTE — PATIENT INSTRUCTIONS
Thank you for choosing the [facility] for your EMG testing.    The ordering provider will receive your final EMG results within the next few days.  Please follow up with your provider for the results and further treatment recommendations.

## 2022-10-19 NOTE — LETTER
10/19/2022         RE: Shaq Ny  [address] [place] [address]        Dear Colleague,    Thank you for referring your patient, Shaq Ny, to the [facility]. Please see a copy of my visit note below.    [facility]  [address]  [place] [address]  Office: [phone] Fax: [phone]    Electromyography and Nerve Conduction Study Report        Indication: Patient presents at the request of Dr. Eric Quiroz for right upper extremity EMG. He is status post ACDF C5-6 and C6-7 in August 2022.  He reports right upper extremity numbness tingling and weakness to the deltoid weakness of the shoulder abduction.  Also having progressive weakness of the hands and numbness and tingling in both hands digits 4 and 5 has a history of diabetes mellitus.  He has pain through his right shoulder as well into the tricep bicep.  He is right-handed.  On exam he has atrophy of the right deltoid with 0/5 strength right shoulder abductors, 5 -/5 right elbow flexors, 5/5 right elbow extensors pronators.  4/5 interosseous and 5/5 long finger flexors.  Absent reflexes to the upper extremities bilaterally.  Negative Tami's.        Pt Exam Discussion (Communication Barriers):  Electromyography and nerve conduction testing, including associated discomfort, was discussed with the patient prior to the procedure.  No learning/ communication barriers; patient verbalized understanding of procedure.  Informed consent was obtained.           Pt Assessment:  Testing was successfully completed; patient tolerated testing well.       Blood Thinners: None Skin Temperature: Warmed 31.2                      EMG/NCS  results:     Nerve Conduction Studies  Motor Sites      Amplitude Segment CV Distal Latency F-Latency F-Estimate Temp Comment   Site (mV)  (m/s) (ms) ms ms  C    Right Median (APB) Motor   Wrist 6.1 Wrist-APB  *4.6   23.1    Elbow 5.7  Elbow-Wrist *45 10.1   23.1    Left Ulnar (ADM) Motor   Wrist 3.5   3.3   23.2    Bel Elbow 3.5 Bel Elbow-Wrist 62 7.2   23.2    Ab Elbow 2.8 Ab Elbow-Wrist 59 9.4   23.3    Right Ulnar (ADM) Motor   Wrist 1.16   3.2   23.1    Bel Elbow 0.66 Bel Elbow-Wrist 27 12.3   23.2    Ab Elbow 0.39 Ab Elbow-Wrist 24 19.2   23.2    Right Ulnar (FDI) Motor   Wrist 0.48 Wrist-FDI  *5.0   23.1    Bel Elbow 0.16 Bel Elbow-Wrist 37 11.4   23.1    Elbow *0.11 Elbow-Wrist *39 14.5   23.2      Sensory Sites      Amplitude CV Onset Lat Peak Lat Temp Comment   Site ( V) (m/s) (ms) (ms)  C    Right Median Anti Sensory   Wrist-Dig II *NR *NR *NR *NR 23.3    Right Median-Ulnar Palmar Sensory        Median   Palm-Wrist 8 40 2.0 *2.6 23         Ulnar   Palm-Wrist *NR *NR *NR *NR 23.1    Left Radial Sensory   Forearm-Wrist *18 57 1.75 2.3 23.2    Right Radial Sensory   Forearm-Wrist *14 56 1.80 2.4 23    Left Ulnar Anti Sensory   Wrist-Dig V *NR *NR *NR *NR 23.2    Right Ulnar Anti Sensory   Wrist-Dig V *NR *NR *NR *NR 23        NCS Waveforms:    Motor                Sensory                      Electromyography     Side Muscle Nerve Root Ins Act Fibs Psw Amp Dur Poly Recrt Int Pat Comment   Right Deltoid Axillary C5-6 *Incr *3+ *3+ Nml Nml 0 Nml Nml No MUP Activation   Right Triceps Radial C6-7-8 Nml Nml Nml Nml Nml 0 Nml Nml    Right Biceps2 Musculocut C5-6 Nml Nml Nml Nml Nml 0 Nml Nml    Right BrachioRad2 Radial C5-6 *Incr *2+ *2+ Nml Nml 0 *Reduced Nml    Right PronatorTeres Median C6-7 Nml Nml Nml Nml Nml 0 Nml Nml    Right 1stDorInt Ulnar C8-T1 *Incr *3+ *3+ Nml Nml 0 *Reduced Nml    Right Abd Poll Brev Median C8-T1 Nml Nml Nml Nml Nml 0 Nml Nml    Right Ext Indicis2 Radial (Post Int) C7-8 Nml Nml Nml Nml Nml 0 Nml Nml    Right FlexCarpiUln2 Ulnar C8,T1 Nml Nml Nml Nml Nml 0 Nml Nml    Right Abd Dig Min2 Ulnar C8-T1 Nml Nml Nml Nml Nml 0 *Reduced Nml    Right Cervical Parasp Mid Rami C4-6 Nml Nml Nml             Comment NCS: Abnormal  study:    1.  Absent right median and bilateral ulnar SNAPs.  2.  Low amplitude bilateral radial SNAPs.  3.  Prolonged latency right median transcarpal study with significantly low amplitude.  4.  Absent right ulnar transcarpal study.  5.  Borderline latency right median CMAP with mildly slowed conduction velocity.  6.  Diffusely low amplitude of the right ulnar CMAP to the ADM with significantly diffuse slowed conduction velocity without significant amplitude drop or focal slowing across the elbow.  Significantly low amplitude throughout makes any amplitude drop difficult to evaluate.  7.  Diffusely low amplitude right ulnar CMAP to the FDI without amplitude drop or slowing across the elbow.  8.  Diffusely low amplitude of the left ulnar CMAP to the ADM which is 50% of normal without amplitude drop or slowing across the elbow.     Comment EMG: Abnormal study.  1.  Increased insertional activity with positive waves and fibrillation potentials of the right deltoid with no motor unit potential activation.  Increased insertional activity with positive waves and  denervation potentials right brachioradialis with reduced recruitment.  There is also increased insertional activity with denervation potentials of the right first dorsal interosseous and reduced recruitment of the first dorsal interosseous and abductor digiti minimi.  Remainder right upper extremity needle EMG normal.    Interpretation: Abnormal study: There is electrodiagnostic evidence of:    1.  Right C5 and/or C6 radiculopathy, active.  C6 involvement would be less likely given normal needle EMG to the bicep and pronator teres.     2.  Right ulnar neuropathy not localizable.  Due to the degree of axonal loss to the first dorsal interosseous and abductor digit he minimi, this is very difficult to localize and appears severe.  This localizes distal to the takeoff of the flexor carpi ulnaris on needle EMG.  C8/T1 radiculopathy would be less likely given  needle EMG abnormalities are only noted in the ulnar innervated C8/T1 muscles.      3.  I cannot completely exclude a relatively mild concomitant median neuropathy at the wrist consistent with entrapment in the carpal tunnel.    4.  There are diffuse changes noted on nerve conduction study which are consistent with but not confirmatory for a sensorimotor peripheral polyneuropathy.  This would be consistent with his history of diabetes mellitus.       The testing was completed in its entirety by the physician.      It was our pleasure caring for your patient today, if there any questions or concerns please do not hesitate to contact us.        Again, thank you for allowing me to participate in the care of your patient.        Sincerely,        Dann Faye, DO

## 2022-10-21 ENCOUNTER — HOSPITAL ENCOUNTER (OUTPATIENT)
Dept: PHYSICAL THERAPY | Facility: REHABILITATION | Age: 62
Discharge: HOME | End: 2022-10-21
Payer: COMMERCIAL

## 2022-10-21 DIAGNOSIS — M48.02 SPINAL STENOSIS IN CERVICAL REGION: ICD-10-CM

## 2022-10-21 DIAGNOSIS — M54.12 CERVICAL RADICULOPATHY: ICD-10-CM

## 2022-10-21 DIAGNOSIS — G58.9 NERVE PALSY: Primary | ICD-10-CM

## 2022-10-21 PROCEDURE — 99207 PR NO CHARGE LOS: CPT | Mod: GP

## 2022-10-21 NOTE — ADDENDUM NOTE
Encounter addended by: Susan Trammell, PT on: 10/21/2022 10:09 AM   Actions taken: Flowsheet accepted

## 2022-10-25 ENCOUNTER — HOSPITAL ENCOUNTER (OUTPATIENT)
Dept: PHYSICAL THERAPY | Facility: REHABILITATION | Age: 62
Discharge: HOME | End: 2022-10-25
Payer: COMMERCIAL

## 2022-10-25 DIAGNOSIS — M54.12 CERVICAL RADICULOPATHY: ICD-10-CM

## 2022-10-25 DIAGNOSIS — G58.9 NERVE PALSY: Primary | ICD-10-CM

## 2022-10-25 DIAGNOSIS — M48.02 SPINAL STENOSIS IN CERVICAL REGION: ICD-10-CM

## 2022-10-25 PROCEDURE — 97110 THERAPEUTIC EXERCISES: CPT | Mod: GP

## 2022-10-25 PROCEDURE — 97140 MANUAL THERAPY 1/> REGIONS: CPT | Mod: GP

## 2022-10-28 ENCOUNTER — OFFICE VISIT (OUTPATIENT)
Dept: NEUROSURGERY | Facility: CLINIC | Age: 62
End: 2022-10-28
Payer: COMMERCIAL

## 2022-10-28 VITALS
HEIGHT: 71 IN | HEART RATE: 82 BPM | OXYGEN SATURATION: 98 % | WEIGHT: 168 LBS | DIASTOLIC BLOOD PRESSURE: 55 MMHG | BODY MASS INDEX: 23.52 KG/M2 | SYSTOLIC BLOOD PRESSURE: 110 MMHG

## 2022-10-28 DIAGNOSIS — M54.12 CERVICAL RADICULOPATHY: Primary | ICD-10-CM

## 2022-10-28 DIAGNOSIS — R20.2 NUMBNESS AND TINGLING IN RIGHT HAND: ICD-10-CM

## 2022-10-28 DIAGNOSIS — R20.0 NUMBNESS AND TINGLING IN RIGHT HAND: ICD-10-CM

## 2022-10-28 PROCEDURE — 99024 POSTOP FOLLOW-UP VISIT: CPT | Performed by: NEUROLOGICAL SURGERY

## 2022-10-28 ASSESSMENT — PATIENT HEALTH QUESTIONNAIRE - PHQ9: SUM OF ALL RESPONSES TO PHQ QUESTIONS 1-9: 11

## 2022-10-28 NOTE — LETTER
10/28/2022         RE: Shaq Ny  [address] [place] [address]        Dear Colleague,    Thank you for referring your patient, Shaq Ny, to the [facility]. Please see a copy of my visit note below.    Repeat EMG in 3 months, return to clinic to discuss results.     Dahiana Riley RN     Mr. Ny returns for 8-week follow-up after an anterior cervical discectomy and fusion at C5-6 and C6-7.  The surgery was intended to treat a left C7 radiculopathy present as a result of a focal disc protrusion at left C6-7 as well as to provide decompression of spinal stenosis at C5-6.  Surgery was apparently uncomplicated and the patient noted immediate improvement in his left upper extremity subsequent to the operation.  However, approximately 10 to 14 days following surgery he reports that he awakened with a 5th nerve palsy, unable to abduct his previously asymptomatic right upper extremity.  This problem has continued and he has undergone an EMG.  The EMG suggested the presence of an active right C5 and C6 radiculopathy.  In addition, the presence of a right ulnar neuropathy was also detected.    On reexamination today in the office he has no contraction of his right deltoid.  Left deltoid is strong.  Right biceps is slightly weak and rated at 5-/5.   strength is equal and full bilaterally.  There is some evidence of first dorsal interosseous muscle wasting more prominent on the right than the left but otherwise ulnar innervated musculature appears to be equal and strong bilaterally.  Tinel's sign is negative at the right elbow.  Palpation does not produce discomfort and there is no history of injury to the elbow.    His surgical incision is well-healed.  Cervical range of motion is slightly restricted but nonpainful.  Left upper extremity has 5/5 strength throughout with the sole exception of the left triceps which is somewhat weak at 4/5.  There are no long  track findings.  Gait is normal.    Plain radiographs of the cervical spine obtained recently show good location of the interbody arthrodesis without evidence of complication.  Anterior instrumentation likewise is well-placed without evidence of loosening or fracture.  Comparison the preoperative cervical MRI scan with the postoperative MRI scan obtained on October 11, 2022 does not show significant interval change at the C4-5 level.  The surgical procedure of course did not involve the C4-5 level.  Spinal canal appears to be well decompressed.  There is some slight foraminal stenosis noted at right C4-5 but no compelling evidence of impingement of the exiting right C5 nerve root to my review.    Complicating features of this case include a 15-year history of type 1 diabetes with some EMG evidence for peripheral neuropathy potentially correlated.  It is also unfortunate that the patient has resumed smoking since the time of surgery.    Assessment: Technically successful two-level anterior cervical discectomy, decompression, and fusion at the C5-6 and C6-7 levels with improvement/resolution of left upper extremity radicular symptoms.  Unfortunately, the patient has developed a right C5 palsy, now with an EMG proven, which began 1 to 2 weeks subsequent to the surgery which did not involve the C4-5 level.  I reviewed the differential diagnosis for this problem with the patient and discussed management alternatives.  Presently, I see no great alternative other than continued clinical observation and repeat EMG at 3 months.  I attempted to reassure the patient that it is possible and perhaps probable that he will experience recovery of his right deltoid muscle.  I have asked him to call or return to the office or emergency room should he experience any significant worsening of his current symptoms.  I will plan to see him back in the office once again after the planned EMG is completed.      Again, thank you for allowing  me to participate in the care of your patient.        Sincerely,        Eric Quiroz MD

## 2022-10-28 NOTE — PROGRESS NOTES
Mr. Ny returns for 8-week follow-up after an anterior cervical discectomy and fusion at C5-6 and C6-7.  The surgery was intended to treat a left C7 radiculopathy present as a result of a focal disc protrusion at left C6-7 as well as to provide decompression of spinal stenosis at C5-6.  Surgery was apparently uncomplicated and the patient noted immediate improvement in his left upper extremity subsequent to the operation.  However, approximately 10 to 14 days following surgery he reports that he awakened with a 5th nerve palsy, unable to abduct his previously asymptomatic right upper extremity.  This problem has continued and he has undergone an EMG.  The EMG suggested the presence of an active right C5 and C6 radiculopathy.  In addition, the presence of a right ulnar neuropathy was also detected.    On reexamination today in the office he has no contraction of his right deltoid.  Left deltoid is strong.  Right biceps is slightly weak and rated at 5-/5.   strength is equal and full bilaterally.  There is some evidence of first dorsal interosseous muscle wasting more prominent on the right than the left but otherwise ulnar innervated musculature appears to be equal and strong bilaterally.  Tinel's sign is negative at the right elbow.  Palpation does not produce discomfort and there is no history of injury to the elbow.    His surgical incision is well-healed.  Cervical range of motion is slightly restricted but nonpainful.  Left upper extremity has 5/5 strength throughout with the sole exception of the left triceps which is somewhat weak at 4/5.  There are no long track findings.  Gait is normal.    Plain radiographs of the cervical spine obtained recently show good location of the interbody arthrodesis without evidence of complication.  Anterior instrumentation likewise is well-placed without evidence of loosening or fracture.  Comparison the preoperative cervical MRI scan with the postoperative MRI scan  obtained on October 11, 2022 does not show significant interval change at the C4-5 level.  The surgical procedure of course did not involve the C4-5 level.  Spinal canal appears to be well decompressed.  There is some slight foraminal stenosis noted at right C4-5 but no compelling evidence of impingement of the exiting right C5 nerve root to my review.    Complicating features of this case include a 15-year history of type 1 diabetes with some EMG evidence for peripheral neuropathy potentially correlated.  It is also unfortunate that the patient has resumed smoking since the time of surgery.    Assessment: Technically successful two-level anterior cervical discectomy, decompression, and fusion at the C5-6 and C6-7 levels with improvement/resolution of left upper extremity radicular symptoms.  Unfortunately, the patient has developed a right C5 palsy, now with an EMG proven, which began 1 to 2 weeks subsequent to the surgery which did not involve the C4-5 level.  I reviewed the differential diagnosis for this problem with the patient and discussed management alternatives.  Presently, I see no great alternative other than continued clinical observation and repeat EMG at 3 months.  I attempted to reassure the patient that it is possible and perhaps probable that he will experience recovery of his right deltoid muscle.  I have asked him to call or return to the office or emergency room should he experience any significant worsening of his current symptoms.  I will plan to see him back in the office once again after the planned EMG is completed.

## 2022-10-28 NOTE — NURSING NOTE
Pt is a follow up after an EMG. Pt has pain in his neck shoulders and arms. Pt has numbness in the pinky of left hand and last 2 fingers on the right hand. Pt cant lift his right arm.   NDI:  38%  Jack Castaneda MA

## 2022-11-04 ENCOUNTER — HOSPITAL ENCOUNTER (OUTPATIENT)
Dept: PHYSICAL THERAPY | Facility: REHABILITATION | Age: 62
Discharge: HOME | End: 2022-11-04
Payer: COMMERCIAL

## 2022-11-04 DIAGNOSIS — M48.02 SPINAL STENOSIS IN CERVICAL REGION: ICD-10-CM

## 2022-11-04 DIAGNOSIS — G58.9 NERVE PALSY: Primary | ICD-10-CM

## 2022-11-04 DIAGNOSIS — M54.12 CERVICAL RADICULOPATHY: ICD-10-CM

## 2022-11-04 PROCEDURE — 97032 APPL MODALITY 1+ESTIM EA 15: CPT | Mod: GP

## 2022-11-04 PROCEDURE — 97110 THERAPEUTIC EXERCISES: CPT | Mod: GP

## 2022-11-14 ENCOUNTER — HOSPITAL ENCOUNTER (OUTPATIENT)
Dept: PHYSICAL THERAPY | Facility: REHABILITATION | Age: 62
Discharge: HOME | End: 2022-11-14
Payer: COMMERCIAL

## 2022-11-14 DIAGNOSIS — G58.9 NERVE PALSY: Primary | ICD-10-CM

## 2022-11-14 DIAGNOSIS — M54.12 CERVICAL RADICULOPATHY: ICD-10-CM

## 2022-11-14 PROCEDURE — 97110 THERAPEUTIC EXERCISES: CPT | Mod: GP

## 2022-11-14 PROCEDURE — 97032 APPL MODALITY 1+ESTIM EA 15: CPT | Mod: GP

## 2022-11-30 NOTE — PHARMACY-ADMISSION MEDICATION HISTORY
Pharmacy Note - Admission Medication History     ______________________________________________________________________    Prior To Admission (PTA) med list completed and updated in EMR.       PTA Med List   Medication Sig Last Dose     insulin NPH-Regular 70/30 (HUMULIN 70/30;NOVOLIN 70/30) (70-30) 100 UNIT/ML vial Inject 20 Units Subcutaneous daily (with breakfast) 1/30/2022 at Unknown time     insulin NPH-Regular 70/30 (HUMULIN 70/30;NOVOLIN 70/30) (70-30) 100 UNIT/ML vial Inject 25 Units Subcutaneous daily (with dinner) 1/29/2022 at Unknown time       Information source(s): Patient  Method of interview communication: in-person    Summary of Changes to PTA Med List  New: Novolin 70/30    Patient was asked about OTC/herbal products specifically.  PTA med list reflects this.    Allergies were reviewed, assessed, and updated with the patient.      Patient does not use any multi-dose medications prior to admission.    The information provided in this note is only as accurate as the sources available at the time of the update(s).    Thank you for the opportunity to participate in the care of this patient.    Dipti Ayala, [facility]  1/30/2022 6:25 PM     Dorsal Nasal Flap Text: The defect edges were debeveled with a #15 scalpel blade.  Given the location of the defect and the proximity to free margins a dorsal nasal flap was deemed most appropriate.  Using a sterile surgical marker, an appropriate dorsal nasal flap was drawn around the defect.    The area thus outlined was incised deep to adipose tissue with a #15 scalpel blade.  The skin margins were undermined to an appropriate distance in all directions utilizing iris scissors.

## 2022-12-01 ENCOUNTER — HOSPITAL ENCOUNTER (OUTPATIENT)
Dept: PHYSICAL THERAPY | Facility: REHABILITATION | Age: 62
Discharge: HOME | End: 2022-12-01
Payer: COMMERCIAL

## 2022-12-01 DIAGNOSIS — M54.12 CERVICAL RADICULOPATHY: ICD-10-CM

## 2022-12-01 DIAGNOSIS — M48.02 SPINAL STENOSIS IN CERVICAL REGION: ICD-10-CM

## 2022-12-01 DIAGNOSIS — G58.9 NERVE PALSY: Primary | ICD-10-CM

## 2022-12-01 PROCEDURE — 97032 APPL MODALITY 1+ESTIM EA 15: CPT | Mod: GP

## 2022-12-01 PROCEDURE — 97110 THERAPEUTIC EXERCISES: CPT | Mod: GP

## 2022-12-16 ENCOUNTER — HOSPITAL ENCOUNTER (OUTPATIENT)
Dept: PHYSICAL THERAPY | Facility: REHABILITATION | Age: 62
Discharge: HOME | End: 2022-12-16
Payer: COMMERCIAL

## 2022-12-16 DIAGNOSIS — G58.9 NERVE PALSY: Primary | ICD-10-CM

## 2022-12-16 DIAGNOSIS — M54.12 CERVICAL RADICULOPATHY: ICD-10-CM

## 2022-12-16 DIAGNOSIS — M48.02 SPINAL STENOSIS IN CERVICAL REGION: ICD-10-CM

## 2022-12-16 PROCEDURE — 97032 APPL MODALITY 1+ESTIM EA 15: CPT | Mod: GP

## 2022-12-16 PROCEDURE — 97110 THERAPEUTIC EXERCISES: CPT | Mod: GP

## 2023-01-30 ENCOUNTER — OFFICE VISIT (OUTPATIENT)
Dept: NEUROLOGY | Facility: CLINIC | Age: 63
End: 2023-01-30
Attending: NEUROLOGICAL SURGERY
Payer: COMMERCIAL

## 2023-01-30 DIAGNOSIS — R20.0 NUMBNESS AND TINGLING IN RIGHT HAND: ICD-10-CM

## 2023-01-30 DIAGNOSIS — M54.12 CERVICAL RADICULOPATHY: ICD-10-CM

## 2023-01-30 DIAGNOSIS — R20.2 NUMBNESS AND TINGLING IN RIGHT HAND: ICD-10-CM

## 2023-01-30 PROCEDURE — 95909 NRV CNDJ TST 5-6 STUDIES: CPT | Performed by: PSYCHIATRY & NEUROLOGY

## 2023-01-30 PROCEDURE — 95886 MUSC TEST DONE W/N TEST COMP: CPT | Mod: RT | Performed by: PSYCHIATRY & NEUROLOGY

## 2023-01-30 NOTE — LETTER
1/30/2023         RE: Shaq Ny  [address] [place] [address]        Dear Colleague,    Thank you for referring your patient, Shaq Ny, to the [facility] [place]. Please see a copy of my visit note below.    See procedure note for EMG report      Again, thank you for allowing me to participate in the care of your patient.        Sincerely,        Moo Cm MD

## 2023-01-30 NOTE — PROCEDURES
ELECTROMYOGRAPHY (EMG) REPORT       [facility] NEUROLOGY [place]  [address] [place] [address]  Tel: [phone]  Fax: [phone]  [email or web address]     Shaq Ny,  1960, MRN [id number]  PCP: Rena [facility]  Date: 2023     Principal Diagnosis:    Cervical radiculopathy  Numbness and tingling in right hand     Height: 5 feet 11 inch  Reason for referral: Evaluate right upper. c/o weakness, pain, numbness, tingling in right arm/hand/fingers for almost a year. Diabetic > 20 years. h/o neck surgery.      Motor NCS      Nerve / Sites Lat Amp Dist Arnaud    ms mV cm m/s   R Median - APB      Wrist 5.52 3.9 7       Elbow 11.15 3.4 27 48   R Ulnar - ADM      Wrist 6.04 0.4 7       B.Elbow 16.87 0.2 23.5 22      A.Elbow 21.35 0.2 13 29       F  Wave      Nerve Fmin    ms   R Ulnar - ADM 45.94       Sensory NCS      Nerve / Sites Onset Lat Pk Lat Amp.2-3 Dist Arnaud Lat Diff    ms ms  V cm m/s ms   R Median - II (Antidr)      Wrist 4.01 4.64 6.3 13 32    R Ulnar - V (Antidr)      Wrist NR NR NR 11 NR    R Median, Ulnar - Transcarpal comparison      Median Palm NR NR NR 8 NR       Ulnar Palm NR NR NR 8 NR          NR       EMG Summary Table     Spontaneous MUAP Rcmt Note   Muscle Fib PSW Fasc IA # Amp Dur PPP Rate Type   R. Brachioradialis Occ Occ None N N N N N N N   R. Pronator teres Occ Occ None N N N N N N N   R. Biceps brachii Occ Occ None N N N N N N N   R. Deltoid 2+ 2+ None N None - - - N Can't Contract   R. Supraspinatus 2+ 2+ None N Sl Mod Red Incr Incr N N N   R. Infraspinatus 2+ 2+ None N None - - - N Can't Contract   R. Triceps brachii 1+ 1+ None N N N N N N N   R. Anconeus 1+ 1+ None N N N N N N N   R. Extensor digitorum communis 1+ 1+ None N N N N N N N   R. Extensor indicis proprius Occ Occ None N N N N N N N   R. Flexor carpi ulnaris 1+ 1+ None N Sl Mod Red Incr Incr N N N   R. Abductor digiti minimi (manus) 2+ 2+ None N Sl Mod Red Incr Incr N N  N   R. First dorsal interosseous 2+ 2+ None N None - - - N Atrophy/No units seen   R. Abductor pollicis brevis 1+ 1+ None N Mod Red Incr Incr N N N        Summary: Nerve conduction and EMG study of right upper extremity shows:  1. Delayed right median distal motor latency with normal amplitude and conduction velocity  2. Delayed right ulnar distal motor latency with low CMAP and slow conduction  3. Delayed right ulnar F latency  4. Absent median and ulnar SNAP    Impression:   This is a abnormal nerve conduction and EMG study of right upper extremity that suggests sensorimotor polyneuropathy.  Possible superimposed median neuropathy at the wrist consistent with moderate to severe right carpal tunnel syndrome      Moo Cm MD  [facility]  (Formerly, [facility]      This note was dictated using voice recognition software.  Any grammatical or context distortions are unintentional and inherent to the software.

## 2023-02-06 ENCOUNTER — HOSPITAL ENCOUNTER (OUTPATIENT)
Dept: PHYSICAL THERAPY | Facility: REHABILITATION | Age: 63
Discharge: HOME | End: 2023-02-06
Payer: COMMERCIAL

## 2023-02-06 DIAGNOSIS — G58.9 NERVE PALSY: Primary | ICD-10-CM

## 2023-02-06 DIAGNOSIS — M48.02 SPINAL STENOSIS IN CERVICAL REGION: ICD-10-CM

## 2023-02-06 DIAGNOSIS — M54.12 CERVICAL RADICULOPATHY: ICD-10-CM

## 2023-02-06 PROCEDURE — 97110 THERAPEUTIC EXERCISES: CPT | Mod: GP | Performed by: PHYSICAL THERAPIST

## 2023-02-13 ENCOUNTER — HOSPITAL ENCOUNTER (OUTPATIENT)
Dept: PHYSICAL THERAPY | Facility: REHABILITATION | Age: 63
Discharge: HOME | End: 2023-02-13
Payer: COMMERCIAL

## 2023-02-13 DIAGNOSIS — M48.02 SPINAL STENOSIS IN CERVICAL REGION: ICD-10-CM

## 2023-02-13 DIAGNOSIS — G58.9 NERVE PALSY: Primary | ICD-10-CM

## 2023-02-13 DIAGNOSIS — M54.12 CERVICAL RADICULOPATHY: ICD-10-CM

## 2023-02-13 PROCEDURE — 97110 THERAPEUTIC EXERCISES: CPT | Mod: GP | Performed by: PHYSICAL THERAPIST

## 2023-02-13 NOTE — PROGRESS NOTES
[facility]    OUTPATIENT PHYSICAL THERAPY  PLAN OF TREATMENT FOR OUTPATIENT REHABILITATION AND PROGRESS NOTE           Patient's Last Name, First Name, Shaq Vicente Date of Birth  1960   Provider's Name  [facility] Medical Record No.  [id number]    Onset Date  9/13/22 Start of Care Date  9/13/22   Type:     _X_PT   ___OT   ___SLP Medical Diagnosis  C5 nerve palsy   PT Diagnosis  Decreased R shoulder ROM and strength Plan of Treatment  Frequency/Duration: 1-2x/wk  Certification date from 2/13/23 to 5/8/23     Goals:  Goal Identifier (P) HEP   Goal Description (P) Patient will demonstrate independence with HEP to facilitate improvement in function.   Target Date (P) 05/08/23   Date Met      Progress (detail required for progress note): (P) Progressing towards     Goal Identifier (P) R shoulder ROM   Goal Description (P) Patient will improve R shoulder ROM to  equal the L to improve functional mobility.   Target Date (P) 05/08/23   Date Met      Progress (detail required for progress note): (P) Minimal to No Progress: R shoulder flexion: 30 deg, ABDuction: 20 deg     Goal Identifier (P) R shoulder strength   Goal Description (P) Patient will improve R shoulder strength to at least 4/5 to improve ability to complete ADLs.   Target Date (P) 05/08/23   Date Met      Progress (detail required for progress note): (P) Minimal progress; R shoulder strength is grossly 2-/5     Beginning/End Dates of Progress Note Reporting Period:  9/13/22 to 2/13/23    Progress Toward Goals:   Progress this reporting period: Minimal to no progress.  ROM and strength remain significantly impaired.  Pt did have about a 6 week gap due to scheduling miscommunication.      Client Self (Subjective) Report for Progress Note Reporting Period: (P) Pt reports he has had no progression of  symptoms and that everything is the same.  No changes.  Exercises are not getting easier.      Objective Measurements:   Objective Measure: (P) R deltoid strength  Details: (P) 2-/5     R shoulder AROM  R shoulder flexion: 30 deg, ABDuction: 20 deg             I CERTIFY THE NEED FOR THESE SERVICES FURNISHED UNDER        THIS PLAN OF TREATMENT AND WHILE UNDER MY CARE     (Physician co-signature of this document indicates review and certification of the therapy plan).              Referring Provider: Dr. Eric Brooke, PT

## 2023-02-16 ENCOUNTER — HOSPITAL ENCOUNTER (OUTPATIENT)
Dept: PHYSICAL THERAPY | Facility: REHABILITATION | Age: 63
Discharge: HOME | End: 2023-02-16
Payer: COMMERCIAL

## 2023-02-16 DIAGNOSIS — M48.02 SPINAL STENOSIS IN CERVICAL REGION: ICD-10-CM

## 2023-02-16 DIAGNOSIS — M54.12 CERVICAL RADICULOPATHY: ICD-10-CM

## 2023-02-16 DIAGNOSIS — G58.9 NERVE PALSY: Primary | ICD-10-CM

## 2023-02-16 PROCEDURE — 97110 THERAPEUTIC EXERCISES: CPT | Mod: GP | Performed by: PHYSICAL THERAPIST

## 2023-02-20 ENCOUNTER — HOSPITAL ENCOUNTER (OUTPATIENT)
Dept: PHYSICAL THERAPY | Facility: REHABILITATION | Age: 63
Discharge: HOME | End: 2023-02-20
Payer: COMMERCIAL

## 2023-02-20 DIAGNOSIS — M54.12 CERVICAL RADICULOPATHY: ICD-10-CM

## 2023-02-20 DIAGNOSIS — G58.9 NERVE PALSY: Primary | ICD-10-CM

## 2023-02-20 DIAGNOSIS — M48.02 SPINAL STENOSIS IN CERVICAL REGION: ICD-10-CM

## 2023-02-20 PROCEDURE — 97032 APPL MODALITY 1+ESTIM EA 15: CPT | Mod: GP | Performed by: PHYSICAL THERAPIST

## 2023-02-20 PROCEDURE — 97110 THERAPEUTIC EXERCISES: CPT | Mod: GP | Performed by: PHYSICAL THERAPIST

## 2023-02-23 ENCOUNTER — HOSPITAL ENCOUNTER (OUTPATIENT)
Dept: PHYSICAL THERAPY | Facility: REHABILITATION | Age: 63
Discharge: HOME | End: 2023-02-23
Payer: COMMERCIAL

## 2023-02-23 DIAGNOSIS — M48.02 SPINAL STENOSIS IN CERVICAL REGION: ICD-10-CM

## 2023-02-23 DIAGNOSIS — G58.9 NERVE PALSY: Primary | ICD-10-CM

## 2023-02-23 DIAGNOSIS — M54.12 CERVICAL RADICULOPATHY: ICD-10-CM

## 2023-02-23 PROCEDURE — 97110 THERAPEUTIC EXERCISES: CPT | Mod: GP | Performed by: PHYSICAL THERAPIST

## 2023-03-02 ENCOUNTER — HOSPITAL ENCOUNTER (OUTPATIENT)
Dept: PHYSICAL THERAPY | Facility: REHABILITATION | Age: 63
Discharge: HOME | End: 2023-03-02
Payer: COMMERCIAL

## 2023-03-02 DIAGNOSIS — M54.12 CERVICAL RADICULOPATHY: ICD-10-CM

## 2023-03-02 DIAGNOSIS — G58.9 NERVE PALSY: Primary | ICD-10-CM

## 2023-03-02 PROCEDURE — 97110 THERAPEUTIC EXERCISES: CPT | Mod: GP | Performed by: PHYSICAL THERAPIST

## 2023-03-02 PROCEDURE — 97032 APPL MODALITY 1+ESTIM EA 15: CPT | Mod: GP | Performed by: PHYSICAL THERAPIST

## 2023-03-09 ENCOUNTER — HOSPITAL ENCOUNTER (OUTPATIENT)
Dept: PHYSICAL THERAPY | Facility: REHABILITATION | Age: 63
Discharge: HOME | End: 2023-03-09
Payer: COMMERCIAL

## 2023-03-09 DIAGNOSIS — G58.9 NERVE PALSY: Primary | ICD-10-CM

## 2023-03-09 DIAGNOSIS — M48.02 SPINAL STENOSIS IN CERVICAL REGION: ICD-10-CM

## 2023-03-09 DIAGNOSIS — M54.12 CERVICAL RADICULOPATHY: ICD-10-CM

## 2023-03-09 PROCEDURE — 97110 THERAPEUTIC EXERCISES: CPT | Mod: GP | Performed by: PHYSICAL THERAPIST

## 2023-03-09 PROCEDURE — 97112 NEUROMUSCULAR REEDUCATION: CPT | Mod: GP | Performed by: PHYSICAL THERAPIST

## 2023-03-16 ENCOUNTER — HOSPITAL ENCOUNTER (OUTPATIENT)
Dept: PHYSICAL THERAPY | Facility: REHABILITATION | Age: 63
Discharge: HOME | End: 2023-03-16
Payer: COMMERCIAL

## 2023-03-16 DIAGNOSIS — M48.02 SPINAL STENOSIS IN CERVICAL REGION: ICD-10-CM

## 2023-03-16 DIAGNOSIS — G58.9 NERVE PALSY: Primary | ICD-10-CM

## 2023-03-16 DIAGNOSIS — M54.12 CERVICAL RADICULOPATHY: ICD-10-CM

## 2023-03-16 PROCEDURE — 97110 THERAPEUTIC EXERCISES: CPT | Mod: GP | Performed by: PHYSICAL THERAPIST

## 2023-03-17 ENCOUNTER — TELEPHONE (OUTPATIENT)
Dept: NEUROLOGY | Facility: CLINIC | Age: 63
End: 2023-03-17
Payer: COMMERCIAL

## 2023-03-17 DIAGNOSIS — S14.2XXD INJURY OF SPINAL NERVE ROOT AT C5 LEVEL, SUBSEQUENT ENCOUNTER: Primary | ICD-10-CM

## 2023-03-17 DIAGNOSIS — M54.12 CERVICAL RADICULOPATHY: ICD-10-CM

## 2023-03-17 NOTE — TELEPHONE ENCOUNTER
LVM for patient to call back to schedule appointment to see Dr. Quiroz or ORTEGA for ACDF follow up from 8/10/22. Patient has had right arm weakness since surgery.

## 2023-03-17 NOTE — TELEPHONE ENCOUNTER
Patient S/P ACDF 8/10/2022 with Dr Quiroz. Last seen in October by Dr Quiroz. Right arm weakness present since surgery. Has been going to PT for C5 nerve palsy. EMG completed which shows possible ulnar nerve compression. His therapist reached out to let us know patient is due for appointment with our team. Please arrange clinic appointment with Dr Quiroz or ORTEGA (Janae) or another surgeon if Dr Quiroz is unavailable. XR ordered. DME ordered to assist with ROM per PT request.     CHIRAG Jackson-CNP  [facility] Neurosurgery  O: [phone]

## 2023-03-20 ENCOUNTER — PRE VISIT (OUTPATIENT)
Dept: NEUROSURGERY | Facility: CLINIC | Age: 63
End: 2023-03-20
Payer: COMMERCIAL

## 2023-03-20 NOTE — TELEPHONE ENCOUNTER
NEUROSURGERY- NEW PREVISIT PLANNING       Record Status/Location     Referring Provider Chantale Mccloud APRN CNP   Diagnosis Epic right arm weakness since surgery     MRI (HEAD, NECK, SPINE) Epic Imaging 10/11/22 MRI Cervicale Spine   CT Epic Imaging 1/30/22 CT Head   X-ray Epic Imaging X-Ray 8/30/22   INJECTION Epic Encounters [facility]   PHYSICAL THERAPY Epic  Encounters 2/13/23 [facility]    SURGERY Epic 8/10/22 Spinal Stenosis in cervicale region/ Anterior cervical discectomy and fusion C5-C6  C6-C7 Stenosis and left C7 radiculopathy    EMG- 10/19/22- Cervical spinal fusion, Nerve plasty, Cervical Radiculopathy

## 2023-03-23 ENCOUNTER — HOSPITAL ENCOUNTER (OUTPATIENT)
Dept: PHYSICAL THERAPY | Facility: REHABILITATION | Age: 63
Discharge: HOME | End: 2023-03-23
Payer: COMMERCIAL

## 2023-03-23 DIAGNOSIS — M54.12 CERVICAL RADICULOPATHY: ICD-10-CM

## 2023-03-23 DIAGNOSIS — G58.9 NERVE PALSY: Primary | ICD-10-CM

## 2023-03-23 PROCEDURE — 97110 THERAPEUTIC EXERCISES: CPT | Mod: GP | Performed by: PHYSICAL THERAPIST

## 2023-03-24 ENCOUNTER — ANCILLARY PROCEDURE (OUTPATIENT)
Dept: GENERAL RADIOLOGY | Facility: CLINIC | Age: 63
End: 2023-03-24
Attending: NURSE PRACTITIONER
Payer: COMMERCIAL

## 2023-03-24 DIAGNOSIS — S14.2XXD INJURY OF SPINAL NERVE ROOT AT C5 LEVEL, SUBSEQUENT ENCOUNTER: ICD-10-CM

## 2023-03-24 DIAGNOSIS — M54.12 CERVICAL RADICULOPATHY: ICD-10-CM

## 2023-03-24 PROCEDURE — 72040 X-RAY EXAM NECK SPINE 2-3 VW: CPT | Mod: TC | Performed by: RADIOLOGY

## 2023-03-27 ENCOUNTER — OFFICE VISIT (OUTPATIENT)
Dept: NEUROSURGERY | Facility: CLINIC | Age: 63
End: 2023-03-27
Payer: COMMERCIAL

## 2023-03-27 VITALS — SYSTOLIC BLOOD PRESSURE: 110 MMHG | HEART RATE: 82 BPM | OXYGEN SATURATION: 98 % | DIASTOLIC BLOOD PRESSURE: 55 MMHG

## 2023-03-27 DIAGNOSIS — R29.898 SHOULDER WEAKNESS: Primary | ICD-10-CM

## 2023-03-27 NOTE — PROGRESS NOTES
Mr. Ny returns for reevaluation.  Unfortunately, he has not experienced any significant motor return with abduction of his right shoulder.  I can find no evidence of deltoid contraction today to examine nation.  There is a slight bit of right biceps weakness rated 5-/5, otherwise his upper extremity motor examination is normal.  He has no complaint of numbness or sensory loss in a C5 pattern.  Cervical range of motion is pain-free and Spurling sign is negative.    He has undergone an EMG of his right upper extremity in January 30, 2023.  That study was read as showing sensorimotor polyneuropathy involving the right arm with possible median neuropathy consistent with moderate to severe right carpal tunnel syndrome.  To my review, this does not appear to explain the predominant problem.    Plain cervical radiographs obtained 3 days ago show no evidence of complication at the surgical site.  The interbody arthrodesis at C5-6 and C6-7 appear to be well incorporated.  There is no evidence of complication related to the instrumentation.  The adjacent C4-5 level appears unchanged.    Patient has suffered marked atrophy of his right shoulder musculature and is slightly tender with passive range of motion of the right shoulder.    I have explained to him that I am not sure of the diagnosis.  He is a type I diabetic and possibly this is a metabolic problem although the timing and focality as well as the EMG results are somewhat confusing.  I have requested an MRI scan of the right shoulder.  I will plan to contact evaluating neurologist Dr. Cm who performed EMG to seek further clarification and recommendations.  I will plan to see Mr. Ny back when that MRI scan is available for review.    Approximately 15 minutes in total was spent with the patient today the majority reviewing clinical and radiographic findings and discussing differential diagnosis and management alternatives.

## 2023-03-27 NOTE — LETTER
3/27/2023         RE: Shaq Ny  [address] [place] [address]        Dear Colleague,    Thank you for referring your patient, Shaq Ny, to the [facility] NEUROLOGY CLINICS [facility]. Please see a copy of my visit note below.    Mr. Ny returns for reevaluation.  Unfortunately, he has not experienced any significant motor return with abduction of his right shoulder.  I can find no evidence of deltoid contraction today to examine nation.  There is a slight bit of right biceps weakness rated 5-/5, otherwise his upper extremity motor examination is normal.  He has no complaint of numbness or sensory loss in a C5 pattern.  Cervical range of motion is pain-free and Spurling sign is negative.    He has undergone an EMG of his right upper extremity in January 30, 2023.  That study was read as showing sensorimotor polyneuropathy involving the right arm with possible median neuropathy consistent with moderate to severe right carpal tunnel syndrome.  To my review, this does not appear to explain the predominant problem.    Plain cervical radiographs obtained 3 days ago show no evidence of complication at the surgical site.  The interbody arthrodesis at C5-6 and C6-7 appear to be well incorporated.  There is no evidence of complication related to the instrumentation.  The adjacent C4-5 level appears unchanged.    Patient has suffered marked atrophy of his right shoulder musculature and is slightly tender with passive range of motion of the right shoulder.    I have explained to him that I am not sure of the diagnosis.  He is a type I diabetic and possibly this is a metabolic problem although the timing and focality as well as the EMG results are somewhat confusing.  I have requested an MRI scan of the right shoulder.  I will plan to contact evaluating neurologist Dr. Cm who performed EMG to seek further clarification and recommendations.  I will plan to see Mr. Ny back when that  MRI scan is available for review.    Approximately 15 minutes in total was spent with the patient today the majority reviewing clinical and radiographic findings and discussing differential diagnosis and management alternatives.      Again, thank you for allowing me to participate in the care of your patient.        Sincerely,        Eric Quiroz MD

## 2023-03-27 NOTE — PATIENT INSTRUCTIONS
Patient Next Steps:      Order placed for imaging.You can schedule at our  today, or Central Scheduling will call you to make the appointment. If you do not hear from them within 1-2 business days you can call the numbers below.   [phone] ([facility])        Dr Quiroz would like to see you back in the clinic for follow up after MRI is done.  Please call the number below to schedule.         Please call us if you have any further questions or concerns.    [facility] Neurosurgery [facility]   Phone: [phone]  Fax: [phone]

## 2023-03-28 ENCOUNTER — TELEPHONE (OUTPATIENT)
Dept: NEUROSURGERY | Facility: CLINIC | Age: 63
End: 2023-03-28
Payer: COMMERCIAL

## 2023-03-28 NOTE — TELEPHONE ENCOUNTER
Saw Dr Quiroz, who rec a shoulder MRI and follow up in clinic for review.     Called patient to discuss  Reviewed that this is the earliest that Dr Ceja will be in office   [facility] will be in touch for any urgent findings.   Patient agrees

## 2023-03-28 NOTE — TELEPHONE ENCOUNTER
Patient called to inform Dr. Quiroz's team that the MRI is scheduled for 4-3-23.  Patient wants Richie's team to know clinic is not available until the 9th of May, and patient concerned with having to wait so long for results of imaging to be reviewed

## 2023-03-31 ENCOUNTER — TELEPHONE (OUTPATIENT)
Dept: PHYSICAL THERAPY | Facility: REHABILITATION | Age: 63
End: 2023-03-31

## 2023-03-31 ENCOUNTER — HOSPITAL ENCOUNTER (OUTPATIENT)
Dept: PHYSICAL THERAPY | Facility: REHABILITATION | Age: 63
Discharge: HOME | End: 2023-03-31
Payer: COMMERCIAL

## 2023-03-31 DIAGNOSIS — G58.9 NERVE PALSY: Primary | ICD-10-CM

## 2023-03-31 PROCEDURE — 97110 THERAPEUTIC EXERCISES: CPT | Mod: GP | Performed by: PHYSICAL THERAPIST

## 2023-03-31 PROCEDURE — 97112 NEUROMUSCULAR REEDUCATION: CPT | Mod: GP | Performed by: PHYSICAL THERAPIST

## 2023-03-31 PROCEDURE — 97530 THERAPEUTIC ACTIVITIES: CPT | Mod: GP | Performed by: PHYSICAL THERAPIST

## 2023-03-31 NOTE — ADDENDUM NOTE
Encounter addended by: Murphy Miranda, PT on: 3/31/2023 12:47 PM   Actions taken: Charge Capture section accepted

## 2023-03-31 NOTE — TELEPHONE ENCOUNTER
Called and spoke with patient regarding missed appt this morning. Patient states he completely forgot and was very apologetic. Patient confirmed his future PT appointments and would like to keep them and is planning to attend them.

## 2023-04-03 ENCOUNTER — HOSPITAL ENCOUNTER (OUTPATIENT)
Dept: MRI IMAGING | Facility: HOSPITAL | Age: 63
Discharge: HOME | End: 2023-04-03
Attending: NEUROLOGICAL SURGERY | Admitting: NEUROLOGICAL SURGERY
Payer: COMMERCIAL

## 2023-04-03 DIAGNOSIS — R29.898 SHOULDER WEAKNESS: ICD-10-CM

## 2023-04-03 PROCEDURE — 73221 MRI JOINT UPR EXTREM W/O DYE: CPT | Mod: RT

## 2023-04-14 ENCOUNTER — HOSPITAL ENCOUNTER (OUTPATIENT)
Dept: PHYSICAL THERAPY | Facility: REHABILITATION | Age: 63
Discharge: HOME | End: 2023-04-14
Payer: COMMERCIAL

## 2023-04-14 DIAGNOSIS — M54.12 CERVICAL RADICULOPATHY: ICD-10-CM

## 2023-04-14 DIAGNOSIS — G58.9 NERVE PALSY: Primary | ICD-10-CM

## 2023-04-14 PROCEDURE — 97110 THERAPEUTIC EXERCISES: CPT | Mod: GP | Performed by: PHYSICAL THERAPIST

## 2023-04-14 NOTE — PROGRESS NOTES
"[facility]    Outpatient Physical Therapy Discharge Note  Patient: Shaq Ny  : 1960    Beginning/End Dates of Reporting Period:  2022 to 2023 with total of 20 PT sessions    Referring Provider: Chantale Oscar CNP    Therapy Diagnosis: C5 nerve palsy; decreased R shoulder ROM and strength     Client Self Report: Pt reports no improvement with his arm, \"Not that I can tell\".    Objective Measurements:  Objective Measure: R deltoid strength  Details: 2-/5  Objective Measure: right shoulder ER MMT  Details: 2-/5  Objective Measure: R shoulder A/PROM  Details: Flexion: 35 deg/132 deg; ABDuction: 17 deg/95 deg  Objective Measure: R shoulder inferior subluxation  Details: 1-2 finger width subluxation        Goals:  Goal Identifier HEP   Goal Description Patient will demonstrate independence with HEP to facilitate improvement in function.   Target Date 23   Date Met      Progress (detail required for progress note): Progressing towards     Goal Identifier R shoulder ROM   Goal Description Patient will improve R shoulder ROM to  equal the L to improve functional mobility.   Target Date 23   Date Met      Progress (detail required for progress note): (P) NOT MET: Minimal to No Progress: R shoulder flexion: 30 deg, ABDuction: 17 deg     Goal Identifier R shoulder strength   Goal Description Patient will improve R shoulder strength to at least 4/5 to improve ability to complete ADLs.   Target Date 23   Date Met      Progress (detail required for progress note): (P) NOT MET: Minimal progress; R shoulder strength is grossly 2-/5     Plan:  Discharge from therapy.    Discharge:    Reason for Discharge: No further expectation of progress.  Pt has not made any appreciable or functional improvement since starting PT.  He continues to be very weak and lacks ROM and general functional " use of his shoulder despite attempts with NMES as well as progression of exercise and ROM.     Equipment Issued: None    Discharge Plan: Patient to continue home program.

## 2023-05-09 ENCOUNTER — OFFICE VISIT (OUTPATIENT)
Dept: NEUROSURGERY | Facility: CLINIC | Age: 63
End: 2023-05-09
Payer: COMMERCIAL

## 2023-05-09 VITALS
BODY MASS INDEX: 23.52 KG/M2 | DIASTOLIC BLOOD PRESSURE: 71 MMHG | HEART RATE: 71 BPM | SYSTOLIC BLOOD PRESSURE: 113 MMHG | WEIGHT: 168 LBS | HEIGHT: 71 IN | OXYGEN SATURATION: 99 %

## 2023-05-09 DIAGNOSIS — M54.12 CERVICAL RADICULOPATHY: ICD-10-CM

## 2023-05-09 DIAGNOSIS — R20.0 NUMBNESS AND TINGLING IN RIGHT HAND: ICD-10-CM

## 2023-05-09 DIAGNOSIS — R29.898 SHOULDER WEAKNESS: Primary | ICD-10-CM

## 2023-05-09 DIAGNOSIS — R20.2 NUMBNESS AND TINGLING IN RIGHT HAND: ICD-10-CM

## 2023-05-09 PROCEDURE — 99213 OFFICE O/P EST LOW 20 MIN: CPT | Performed by: NEUROLOGICAL SURGERY

## 2023-05-09 ASSESSMENT — PAIN SCALES - GENERAL: PAINLEVEL: NO PAIN (0)

## 2023-05-09 NOTE — LETTER
5/9/2023         RE: Shaq Ny  [address] [place] [address]        Dear Colleague,    Thank you for referring your patient, Shaq Ny, to the [facility] NEUROLOGY CLINICS [facility]. Please see a copy of my visit note below.    Mr. Ny is seen back in follow-up after an MRI scan of his right shoulder.  This study did not show any significant pathology involving the joint or the apex of the lung.  However, abnormal signal from the deltoid, supra and infraspinatus as well as a portion of the latissimus was noted suggesting the possibility of myositis or possibly neuritis such as Parsonage-Kee syndrome.    I have also discussed the prior EMG with Dr. Cm.  He confirms findings in a C6 and C7 radicular pattern particularly C6 suggesting either neuritis or a chronic radiculopathy.  He did not evaluate the C5 nerve root unfortunately.    On examination, Mr. Ny has very minimal abduction of his right shoulder consistent with contraction of his supraspinatus muscle primarily.  There is no visible contraction of the deltoid muscle.    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  I reviewed the results of the shoulder MRI scan with the patient and recommended dedicated right brachial plexus MRI to determine if there is evidence of primary plexitis or other pathology.  I will plan to see him back in the office when that study result is available.  If this is not confirmatory for brachial plexitis and consideration for a repeat EMG or cervical myelogram and CT scan might be considered.    I discussed the differential diagnosis, management alternatives and prognosis once again today in the office with the patient.    Approximately 20 minutes in total was spent reviewing the relevant studies and discussing differential diagnosis and management alternatives.      Again, thank you for allowing me to participate in the care  of your patient.        Sincerely,        Eric Quiroz MD

## 2023-05-09 NOTE — PROGRESS NOTES
Mr. Ny is seen back in follow-up after an MRI scan of his right shoulder.  This study did not show any significant pathology involving the joint or the apex of the lung.  However, abnormal signal from the deltoid, supra and infraspinatus as well as a portion of the latissimus was noted suggesting the possibility of myositis or possibly neuritis such as Parsonage-Kee syndrome.    I have also discussed the prior EMG with Dr. Cm.  He confirms findings in a C6 and C7 radicular pattern particularly C6 suggesting either neuritis or a chronic radiculopathy.  He did not evaluate the C5 nerve root unfortunately.    On examination, Mr. Ny has very minimal abduction of his right shoulder consistent with contraction of his supraspinatus muscle primarily.  There is no visible contraction of the deltoid muscle.    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  I reviewed the results of the shoulder MRI scan with the patient and recommended dedicated right brachial plexus MRI to determine if there is evidence of primary plexitis or other pathology.  I will plan to see him back in the office when that study result is available.  If this is not confirmatory for brachial plexitis and consideration for a repeat EMG or cervical myelogram and CT scan might be considered.    I discussed the differential diagnosis, management alternatives and prognosis once again today in the office with the patient.    Approximately 20 minutes in total was spent reviewing the relevant studies and discussing differential diagnosis and management alternatives.

## 2023-05-09 NOTE — PATIENT INSTRUCTIONS
Patient Next Steps:    Dr. Quiroz would like you to complete a right brachial plexus MRI and then see him back in clinic in June. I will have our scheduling team contact you to coordinate this.     Please call us if you have any further questions or concerns.    [facility]   Phone: [phone]  Fax: [phone]

## 2023-06-01 ENCOUNTER — HOSPITAL ENCOUNTER (OUTPATIENT)
Dept: MRI IMAGING | Facility: HOSPITAL | Age: 63
Discharge: HOME | End: 2023-06-01
Attending: NEUROLOGICAL SURGERY | Admitting: NEUROLOGICAL SURGERY
Payer: COMMERCIAL

## 2023-06-01 DIAGNOSIS — R20.0 NUMBNESS AND TINGLING IN RIGHT HAND: ICD-10-CM

## 2023-06-01 DIAGNOSIS — M54.12 CERVICAL RADICULOPATHY: ICD-10-CM

## 2023-06-01 DIAGNOSIS — R20.2 NUMBNESS AND TINGLING IN RIGHT HAND: ICD-10-CM

## 2023-06-01 DIAGNOSIS — R29.898 SHOULDER WEAKNESS: ICD-10-CM

## 2023-06-01 PROCEDURE — A9585 GADOBUTROL INJECTION: HCPCS | Performed by: NEUROLOGICAL SURGERY

## 2023-06-01 PROCEDURE — 255N000002 HC RX 255 OP 636: Performed by: NEUROLOGICAL SURGERY

## 2023-06-01 PROCEDURE — 72156 MRI NECK SPINE W/O & W/DYE: CPT

## 2023-06-01 RX ORDER — GADOBUTROL 604.72 MG/ML
7.5 INJECTION INTRAVENOUS ONCE
Status: COMPLETED | OUTPATIENT
Start: 2023-06-01 | End: 2023-06-01

## 2023-06-01 RX ADMIN — GADOBUTROL 7.5 ML: 604.72 INJECTION INTRAVENOUS at 13:27

## 2023-06-06 ENCOUNTER — OFFICE VISIT (OUTPATIENT)
Dept: NEUROSURGERY | Facility: CLINIC | Age: 63
End: 2023-06-06
Payer: COMMERCIAL

## 2023-06-06 VITALS — OXYGEN SATURATION: 96 % | SYSTOLIC BLOOD PRESSURE: 135 MMHG | DIASTOLIC BLOOD PRESSURE: 78 MMHG | HEART RATE: 63 BPM

## 2023-06-06 DIAGNOSIS — R29.898 SHOULDER WEAKNESS: Primary | ICD-10-CM

## 2023-06-06 DIAGNOSIS — M54.12 CERVICAL RADICULOPATHY: ICD-10-CM

## 2023-06-06 PROCEDURE — 99213 OFFICE O/P EST LOW 20 MIN: CPT | Performed by: NEUROLOGICAL SURGERY

## 2023-06-06 ASSESSMENT — PAIN SCALES - GENERAL: PAINLEVEL: MODERATE PAIN (4)

## 2023-06-06 NOTE — PROGRESS NOTES
Mr. Jean returns for reevaluation following an MRI scan of his right brachial plexus.  He has seen no motor return to his right shoulder and continues to have 0/5 strength of his right deltoid.  He has no complaint of numbness into his right arm or other new finding.  He has no new symptoms involving his left arm.  On examination there is noted to be marked atrophy of his right deltoid with 0/5 strength.  Supraspinatus muscle appears to be intact.  Right biceps may be slightly weak at 5-/5 but right triceps and hand intrinsics are strong and equal to the left side.  He is noted to have some slight weakness of his left triceps muscle which was present before his prior anterior cervical discectomy and fusion.    I reviewed the findings from the brachial plexus MRI scan with him today in the office.  I am told him that I am skeptical that he will see any significant motor return for right shoulder abduction given current circumstances and absolute lack of motor movement in the right deltoid.  I have recommended a repeat EMG to look for interval changes and better define the extent of this problem so that I might at least provide a diagnosis.    Approximately 20 minutes in total spent reviewing clinical and electrophysiologic studies as well as counseling Mr. Jean regarding differential diagnosis, management alternatives, etc.

## 2023-06-06 NOTE — LETTER
6/6/2023         RE: Shaq Ny  [address] [place] [address]        Dear Colleague,    Thank you for referring your patient, Shaq Ny, to the [facility] NEUROLOGY CLINICS [facility]. Please see a copy of my visit note below.    Mr. Jean returns for reevaluation following an MRI scan of his right brachial plexus.  He has seen no motor return to his right shoulder and continues to have 0/5 strength of his right deltoid.  He has no complaint of numbness into his right arm or other new finding.  He has no new symptoms involving his left arm.  On examination there is noted to be marked atrophy of his right deltoid with 0/5 strength.  Supraspinatus muscle appears to be intact.  Right biceps may be slightly weak at 5-/5 but right triceps and hand intrinsics are strong and equal to the left side.  He is noted to have some slight weakness of his left triceps muscle which was present before his prior anterior cervical discectomy and fusion.    I reviewed the findings from the brachial plexus MRI scan with him today in the office.  I am told him that I am skeptical that he will see any significant motor return for right shoulder abduction given current circumstances and absolute lack of motor movement in the right deltoid.  I have recommended a repeat EMG to look for interval changes and better define the extent of this problem so that I might at least provide a diagnosis.    Approximately 20 minutes in total spent reviewing clinical and electrophysiologic studies as well as counseling Mr. Jean regarding differential diagnosis, management alternatives, etc.      Again, thank you for allowing me to participate in the care of your patient.        Sincerely,        Eric Quiroz MD

## 2023-06-06 NOTE — PATIENT INSTRUCTIONS
Patient Next Steps:  Order placed for EMG. You can call the phone number highlighted in the order to schedule your appointment. We will call you with the results once EMG is completed. Contact us to let us know after you receive the EMG.   Dr. Cm  tel: [phone]    Please call us if you have any further questions or concerns.    [facility]   Phone: [phone]  Fax: [phone]

## 2023-06-06 NOTE — NURSING NOTE
"Shaq Ny is a 63 year old male who presents for:  Chief Complaint   Patient presents with     RECHECK        Initial Vitals:  /78   Pulse 63   SpO2 96%  Estimated body mass index is 23.43 kg/m  as calculated from the following:    Height as of 5/9/23: 5' 11\" (1.803 m).    Weight as of 5/9/23: 168 lb (76.2 kg).. There is no height or weight on file to calculate BSA. BP completed using cuff size: regular  Moderate Pain (4)    Yogesh Ash    "

## 2023-12-20 ENCOUNTER — HOSPITAL ENCOUNTER (INPATIENT)
Facility: HOSPITAL | Age: 63
LOS: 3 days | Discharge: HOME | DRG: 871 | End: 2023-12-23
Attending: EMERGENCY MEDICINE | Admitting: STUDENT IN AN ORGANIZED HEALTH CARE EDUCATION/TRAINING PROGRAM
Payer: COMMERCIAL

## 2023-12-20 ENCOUNTER — APPOINTMENT (OUTPATIENT)
Dept: RADIOLOGY | Facility: HOSPITAL | Age: 63
DRG: 871 | End: 2023-12-20
Attending: EMERGENCY MEDICINE
Payer: COMMERCIAL

## 2023-12-20 DIAGNOSIS — R65.20 SEPSIS WITH ACUTE HYPOXIC RESPIRATORY FAILURE WITHOUT SEPTIC SHOCK, DUE TO UNSPECIFIED ORGANISM (H): ICD-10-CM

## 2023-12-20 DIAGNOSIS — J10.1 INFLUENZA A: Primary | ICD-10-CM

## 2023-12-20 DIAGNOSIS — E10.10 DIABETIC KETOACIDOSIS WITHOUT COMA ASSOCIATED WITH TYPE 1 DIABETES MELLITUS (H): ICD-10-CM

## 2023-12-20 DIAGNOSIS — A41.9 SEPSIS WITH ACUTE HYPOXIC RESPIRATORY FAILURE WITHOUT SEPTIC SHOCK, DUE TO UNSPECIFIED ORGANISM (H): ICD-10-CM

## 2023-12-20 DIAGNOSIS — J96.01 SEPSIS WITH ACUTE HYPOXIC RESPIRATORY FAILURE WITHOUT SEPTIC SHOCK, DUE TO UNSPECIFIED ORGANISM (H): ICD-10-CM

## 2023-12-20 LAB
ALBUMIN SERPL BCG-MCNC: 3.8 G/DL (ref 3.5–5.2)
ALP SERPL-CCNC: 78 U/L (ref 40–150)
ALT SERPL W P-5'-P-CCNC: 15 U/L (ref 0–70)
ANION GAP SERPL CALCULATED.3IONS-SCNC: 15 MMOL/L (ref 7–15)
AST SERPL W P-5'-P-CCNC: 37 U/L (ref 0–45)
B-OH-BUTYR SERPL-SCNC: 2.3 MMOL/L
BASE EXCESS BLDV CALC-SCNC: -2.5 MMOL/L
BASOPHILS # BLD AUTO: 0 10E3/UL (ref 0–0.2)
BASOPHILS NFR BLD AUTO: 0 %
BILIRUB DIRECT SERPL-MCNC: <0.2 MG/DL (ref 0–0.3)
BILIRUB SERPL-MCNC: 0.5 MG/DL
BUN SERPL-MCNC: 17.5 MG/DL (ref 8–23)
CALCIUM SERPL-MCNC: 8.3 MG/DL (ref 8.8–10.2)
CHLORIDE SERPL-SCNC: 92 MMOL/L (ref 98–107)
CREAT SERPL-MCNC: 1 MG/DL (ref 0.67–1.17)
DEPRECATED HCO3 PLAS-SCNC: 21 MMOL/L (ref 22–29)
EGFRCR SERPLBLD CKD-EPI 2021: 84 ML/MIN/1.73M2
EOSINOPHIL # BLD AUTO: 0 10E3/UL (ref 0–0.7)
EOSINOPHIL NFR BLD AUTO: 0 %
ERYTHROCYTE [DISTWIDTH] IN BLOOD BY AUTOMATED COUNT: 12.3 % (ref 10–15)
FLUAV RNA SPEC QL NAA+PROBE: POSITIVE
FLUBV RNA RESP QL NAA+PROBE: NEGATIVE
GLUCOSE SERPL-MCNC: 324 MG/DL (ref 70–99)
HBA1C MFR BLD: 9 %
HCO3 BLDV-SCNC: 23 MMOL/L (ref 24–30)
HCT VFR BLD AUTO: 44 % (ref 40–53)
HGB BLD-MCNC: 15.1 G/DL (ref 13.3–17.7)
HOLD SPECIMEN: NORMAL
IMM GRANULOCYTES # BLD: 0 10E3/UL
IMM GRANULOCYTES NFR BLD: 0 %
LACTATE SERPL-SCNC: 1.1 MMOL/L (ref 0.7–2)
LIPASE SERPL-CCNC: 11 U/L (ref 13–60)
LYMPHOCYTES # BLD AUTO: 0.4 10E3/UL (ref 0.8–5.3)
LYMPHOCYTES NFR BLD AUTO: 3 %
MAGNESIUM SERPL-MCNC: 1.5 MG/DL (ref 1.7–2.3)
MCH RBC QN AUTO: 30.7 PG (ref 26.5–33)
MCHC RBC AUTO-ENTMCNC: 34.3 G/DL (ref 31.5–36.5)
MCV RBC AUTO: 89 FL (ref 78–100)
MONOCYTES # BLD AUTO: 0.7 10E3/UL (ref 0–1.3)
MONOCYTES NFR BLD AUTO: 7 %
NEUTROPHILS # BLD AUTO: 9.1 10E3/UL (ref 1.6–8.3)
NEUTROPHILS NFR BLD AUTO: 90 %
NRBC # BLD AUTO: 0 10E3/UL
NRBC BLD AUTO-RTO: 0 /100
OXYHGB MFR BLDV: 90.5 % (ref 70–75)
PCO2 BLDV: 40 MM HG (ref 35–50)
PH BLDV: 7.36 [PH] (ref 7.35–7.45)
PHOSPHATE SERPL-MCNC: 3.5 MG/DL (ref 2.5–4.5)
PLATELET # BLD AUTO: 143 10E3/UL (ref 150–450)
PO2 BLDV: 60 MM HG (ref 25–47)
POTASSIUM SERPL-SCNC: 4 MMOL/L (ref 3.4–5.3)
PROCALCITONIN SERPL IA-MCNC: 5.89 NG/ML
PROT SERPL-MCNC: 6.5 G/DL (ref 6.4–8.3)
RBC # BLD AUTO: 4.92 10E6/UL (ref 4.4–5.9)
RSV RNA SPEC NAA+PROBE: NEGATIVE
SAO2 % BLDV: 91.8 % (ref 70–75)
SARS-COV-2 RNA RESP QL NAA+PROBE: NEGATIVE
SODIUM SERPL-SCNC: 128 MMOL/L (ref 135–145)
WBC # BLD AUTO: 10.2 10E3/UL (ref 4–11)

## 2023-12-20 PROCEDURE — 82010 KETONE BODYS QUAN: CPT | Performed by: EMERGENCY MEDICINE

## 2023-12-20 PROCEDURE — 83930 ASSAY OF BLOOD OSMOLALITY: CPT | Performed by: STUDENT IN AN ORGANIZED HEALTH CARE EDUCATION/TRAINING PROGRAM

## 2023-12-20 PROCEDURE — 93005 ELECTROCARDIOGRAM TRACING: CPT | Performed by: STUDENT IN AN ORGANIZED HEALTH CARE EDUCATION/TRAINING PROGRAM

## 2023-12-20 PROCEDURE — 250N000011 HC RX IP 250 OP 636: Mod: JZ | Performed by: STUDENT IN AN ORGANIZED HEALTH CARE EDUCATION/TRAINING PROGRAM

## 2023-12-20 PROCEDURE — 82565 ASSAY OF CREATININE: CPT | Performed by: STUDENT IN AN ORGANIZED HEALTH CARE EDUCATION/TRAINING PROGRAM

## 2023-12-20 PROCEDURE — 99406 BEHAV CHNG SMOKING 3-10 MIN: CPT | Performed by: STUDENT IN AN ORGANIZED HEALTH CARE EDUCATION/TRAINING PROGRAM

## 2023-12-20 PROCEDURE — 258N000003 HC RX IP 258 OP 636: Performed by: EMERGENCY MEDICINE

## 2023-12-20 PROCEDURE — 250N000011 HC RX IP 250 OP 636: Performed by: EMERGENCY MEDICINE

## 2023-12-20 PROCEDURE — 82248 BILIRUBIN DIRECT: CPT | Performed by: STUDENT IN AN ORGANIZED HEALTH CARE EDUCATION/TRAINING PROGRAM

## 2023-12-20 PROCEDURE — 36415 COLL VENOUS BLD VENIPUNCTURE: CPT | Performed by: EMERGENCY MEDICINE

## 2023-12-20 PROCEDURE — 250N000009 HC RX 250: Performed by: EMERGENCY MEDICINE

## 2023-12-20 PROCEDURE — 80053 COMPREHEN METABOLIC PANEL: CPT | Performed by: EMERGENCY MEDICINE

## 2023-12-20 PROCEDURE — 87040 BLOOD CULTURE FOR BACTERIA: CPT | Performed by: EMERGENCY MEDICINE

## 2023-12-20 PROCEDURE — 250N000013 HC RX MED GY IP 250 OP 250 PS 637: Performed by: EMERGENCY MEDICINE

## 2023-12-20 PROCEDURE — 96360 HYDRATION IV INFUSION INIT: CPT

## 2023-12-20 PROCEDURE — 87637 SARSCOV2&INF A&B&RSV AMP PRB: CPT | Performed by: EMERGENCY MEDICINE

## 2023-12-20 PROCEDURE — 83690 ASSAY OF LIPASE: CPT | Performed by: EMERGENCY MEDICINE

## 2023-12-20 PROCEDURE — 83036 HEMOGLOBIN GLYCOSYLATED A1C: CPT | Performed by: STUDENT IN AN ORGANIZED HEALTH CARE EDUCATION/TRAINING PROGRAM

## 2023-12-20 PROCEDURE — 96361 HYDRATE IV INFUSION ADD-ON: CPT

## 2023-12-20 PROCEDURE — 83735 ASSAY OF MAGNESIUM: CPT | Performed by: STUDENT IN AN ORGANIZED HEALTH CARE EDUCATION/TRAINING PROGRAM

## 2023-12-20 PROCEDURE — 250N000013 HC RX MED GY IP 250 OP 250 PS 637: Performed by: STUDENT IN AN ORGANIZED HEALTH CARE EDUCATION/TRAINING PROGRAM

## 2023-12-20 PROCEDURE — 84145 PROCALCITONIN (PCT): CPT | Performed by: EMERGENCY MEDICINE

## 2023-12-20 PROCEDURE — 99291 CRITICAL CARE FIRST HOUR: CPT | Mod: 25

## 2023-12-20 PROCEDURE — 83605 ASSAY OF LACTIC ACID: CPT | Performed by: EMERGENCY MEDICINE

## 2023-12-20 PROCEDURE — 82805 BLOOD GASES W/O2 SATURATION: CPT | Performed by: EMERGENCY MEDICINE

## 2023-12-20 PROCEDURE — 258N000003 HC RX IP 258 OP 636: Performed by: STUDENT IN AN ORGANIZED HEALTH CARE EDUCATION/TRAINING PROGRAM

## 2023-12-20 PROCEDURE — 99223 1ST HOSP IP/OBS HIGH 75: CPT | Mod: 25 | Performed by: STUDENT IN AN ORGANIZED HEALTH CARE EDUCATION/TRAINING PROGRAM

## 2023-12-20 PROCEDURE — 94640 AIRWAY INHALATION TREATMENT: CPT

## 2023-12-20 PROCEDURE — 83970 ASSAY OF PARATHORMONE: CPT | Performed by: STUDENT IN AN ORGANIZED HEALTH CARE EDUCATION/TRAINING PROGRAM

## 2023-12-20 PROCEDURE — 200N000001 HC R&B ICU

## 2023-12-20 PROCEDURE — 71046 X-RAY EXAM CHEST 2 VIEWS: CPT

## 2023-12-20 PROCEDURE — 85025 COMPLETE CBC W/AUTO DIFF WBC: CPT | Performed by: EMERGENCY MEDICINE

## 2023-12-20 PROCEDURE — 84100 ASSAY OF PHOSPHORUS: CPT | Performed by: STUDENT IN AN ORGANIZED HEALTH CARE EDUCATION/TRAINING PROGRAM

## 2023-12-20 RX ORDER — LIDOCAINE 40 MG/G
CREAM TOPICAL
Status: DISCONTINUED | OUTPATIENT
Start: 2023-12-20 | End: 2023-12-23 | Stop reason: HOSPADM

## 2023-12-20 RX ORDER — ONDANSETRON 2 MG/ML
4 INJECTION INTRAMUSCULAR; INTRAVENOUS EVERY 6 HOURS PRN
Status: DISCONTINUED | OUTPATIENT
Start: 2023-12-20 | End: 2023-12-23 | Stop reason: HOSPADM

## 2023-12-20 RX ORDER — NICOTINE 21 MG/24HR
1 PATCH, TRANSDERMAL 24 HOURS TRANSDERMAL DAILY PRN
Status: DISCONTINUED | OUTPATIENT
Start: 2023-12-20 | End: 2023-12-23 | Stop reason: HOSPADM

## 2023-12-20 RX ORDER — FAMOTIDINE 20 MG/1
20 TABLET, FILM COATED ORAL 2 TIMES DAILY PRN
Status: DISCONTINUED | OUTPATIENT
Start: 2023-12-20 | End: 2023-12-23 | Stop reason: HOSPADM

## 2023-12-20 RX ORDER — CEFTRIAXONE 1 G/1
1 INJECTION, POWDER, FOR SOLUTION INTRAMUSCULAR; INTRAVENOUS EVERY 24 HOURS
Status: DISCONTINUED | OUTPATIENT
Start: 2023-12-21 | End: 2023-12-21

## 2023-12-20 RX ORDER — DEXTROSE MONOHYDRATE 25 G/50ML
25-50 INJECTION, SOLUTION INTRAVENOUS
Status: DISCONTINUED | OUTPATIENT
Start: 2023-12-20 | End: 2023-12-23 | Stop reason: HOSPADM

## 2023-12-20 RX ORDER — ACETAMINOPHEN 650 MG/1
650 SUPPOSITORY RECTAL EVERY 4 HOURS PRN
Status: DISCONTINUED | OUTPATIENT
Start: 2023-12-20 | End: 2023-12-23 | Stop reason: HOSPADM

## 2023-12-20 RX ORDER — PROCHLORPERAZINE MALEATE 5 MG
5 TABLET ORAL EVERY 6 HOURS PRN
Status: DISCONTINUED | OUTPATIENT
Start: 2023-12-20 | End: 2023-12-23 | Stop reason: HOSPADM

## 2023-12-20 RX ORDER — ONDANSETRON 4 MG/1
4 TABLET, ORALLY DISINTEGRATING ORAL EVERY 6 HOURS PRN
Status: DISCONTINUED | OUTPATIENT
Start: 2023-12-20 | End: 2023-12-23 | Stop reason: HOSPADM

## 2023-12-20 RX ORDER — SODIUM CHLORIDE AND POTASSIUM CHLORIDE 150; 900 MG/100ML; MG/100ML
INJECTION, SOLUTION INTRAVENOUS CONTINUOUS
Status: DISCONTINUED | OUTPATIENT
Start: 2023-12-20 | End: 2023-12-21

## 2023-12-20 RX ORDER — CEFTRIAXONE 1 G/1
1 INJECTION, POWDER, FOR SOLUTION INTRAMUSCULAR; INTRAVENOUS ONCE
Status: COMPLETED | OUTPATIENT
Start: 2023-12-20 | End: 2023-12-21

## 2023-12-20 RX ORDER — OSELTAMIVIR PHOSPHATE 75 MG/1
75 CAPSULE ORAL 2 TIMES DAILY
Status: DISCONTINUED | OUTPATIENT
Start: 2023-12-20 | End: 2023-12-22

## 2023-12-20 RX ORDER — AZITHROMYCIN 250 MG/1
250 TABLET, FILM COATED ORAL DAILY
Qty: 4 TABLET | Refills: 0 | Status: DISCONTINUED | OUTPATIENT
Start: 2023-12-21 | End: 2023-12-21

## 2023-12-20 RX ORDER — POLYETHYLENE GLYCOL 3350 17 G/17G
17 POWDER, FOR SOLUTION ORAL 2 TIMES DAILY PRN
Status: DISCONTINUED | OUTPATIENT
Start: 2023-12-20 | End: 2023-12-23 | Stop reason: HOSPADM

## 2023-12-20 RX ORDER — ACETAMINOPHEN 325 MG/1
650 TABLET ORAL EVERY 4 HOURS PRN
Status: DISCONTINUED | OUTPATIENT
Start: 2023-12-20 | End: 2023-12-23 | Stop reason: HOSPADM

## 2023-12-20 RX ORDER — OSELTAMIVIR PHOSPHATE 75 MG/1
75 CAPSULE ORAL ONCE
Status: DISCONTINUED | OUTPATIENT
Start: 2023-12-20 | End: 2023-12-20

## 2023-12-20 RX ORDER — NICOTINE POLACRILEX 4 MG
15-30 LOZENGE BUCCAL
Status: DISCONTINUED | OUTPATIENT
Start: 2023-12-20 | End: 2023-12-23 | Stop reason: HOSPADM

## 2023-12-20 RX ORDER — PROCHLORPERAZINE 25 MG
12.5 SUPPOSITORY, RECTAL RECTAL EVERY 12 HOURS PRN
Status: DISCONTINUED | OUTPATIENT
Start: 2023-12-20 | End: 2023-12-23 | Stop reason: HOSPADM

## 2023-12-20 RX ORDER — ENOXAPARIN SODIUM 100 MG/ML
40 INJECTION SUBCUTANEOUS EVERY 24 HOURS
Status: DISCONTINUED | OUTPATIENT
Start: 2023-12-20 | End: 2023-12-23 | Stop reason: HOSPADM

## 2023-12-20 RX ORDER — BISACODYL 10 MG
10 SUPPOSITORY, RECTAL RECTAL DAILY PRN
Status: DISCONTINUED | OUTPATIENT
Start: 2023-12-20 | End: 2023-12-23 | Stop reason: HOSPADM

## 2023-12-20 RX ORDER — IPRATROPIUM BROMIDE AND ALBUTEROL SULFATE 2.5; .5 MG/3ML; MG/3ML
3 SOLUTION RESPIRATORY (INHALATION) ONCE
Status: COMPLETED | OUTPATIENT
Start: 2023-12-20 | End: 2023-12-20

## 2023-12-20 RX ORDER — ACETAMINOPHEN 325 MG/1
650 TABLET ORAL ONCE
Status: COMPLETED | OUTPATIENT
Start: 2023-12-20 | End: 2023-12-20

## 2023-12-20 RX ORDER — DEXTROSE MONOHYDRATE, SODIUM CHLORIDE, AND POTASSIUM CHLORIDE 50; 1.49; 4.5 G/1000ML; G/1000ML; G/1000ML
INJECTION, SOLUTION INTRAVENOUS CONTINUOUS
Status: DISCONTINUED | OUTPATIENT
Start: 2023-12-20 | End: 2023-12-21

## 2023-12-20 RX ADMIN — SODIUM CHLORIDE 1000 ML: 9 INJECTION, SOLUTION INTRAVENOUS at 22:30

## 2023-12-20 RX ADMIN — NICOTINE 1 PATCH: 21 PATCH, EXTENDED RELEASE TRANSDERMAL at 23:00

## 2023-12-20 RX ADMIN — SODIUM CHLORIDE 1000 ML: 9 INJECTION, SOLUTION INTRAVENOUS at 20:28

## 2023-12-20 RX ADMIN — AZITHROMYCIN MONOHYDRATE 500 MG: 500 INJECTION, POWDER, LYOPHILIZED, FOR SOLUTION INTRAVENOUS at 22:30

## 2023-12-20 RX ADMIN — ACETAMINOPHEN 650 MG: 325 TABLET ORAL at 21:13

## 2023-12-20 RX ADMIN — ENOXAPARIN SODIUM 40 MG: 40 INJECTION SUBCUTANEOUS at 23:00

## 2023-12-20 RX ADMIN — OSELTAMIVIR PHOSPHATE 75 MG: 75 CAPSULE ORAL at 23:00

## 2023-12-20 RX ADMIN — IPRATROPIUM BROMIDE AND ALBUTEROL SULFATE 3 ML: .5; 3 SOLUTION RESPIRATORY (INHALATION) at 22:40

## 2023-12-20 ASSESSMENT — ACTIVITIES OF DAILY LIVING (ADL)
ADLS_ACUITY_SCORE: 35
ADLS_ACUITY_SCORE: 35

## 2023-12-21 LAB
ALBUMIN SERPL BCG-MCNC: 3.2 G/DL (ref 3.5–5.2)
ANION GAP SERPL CALCULATED.3IONS-SCNC: 10 MMOL/L (ref 7–15)
ANION GAP SERPL CALCULATED.3IONS-SCNC: 11 MMOL/L (ref 7–15)
ANION GAP SERPL CALCULATED.3IONS-SCNC: 11 MMOL/L (ref 7–15)
ANION GAP SERPL CALCULATED.3IONS-SCNC: 13 MMOL/L (ref 7–15)
B-OH-BUTYR SERPL-SCNC: 0.7 MMOL/L
B-OH-BUTYR SERPL-SCNC: 1.2 MMOL/L
B-OH-BUTYR SERPL-SCNC: <0.18 MMOL/L
B-OH-BUTYR SERPL-SCNC: <0.18 MMOL/L
BUN SERPL-MCNC: 19.3 MG/DL (ref 8–23)
BUN SERPL-MCNC: 19.4 MG/DL (ref 8–23)
BUN SERPL-MCNC: 20 MG/DL (ref 8–23)
BUN SERPL-MCNC: 20.2 MG/DL (ref 8–23)
CALCIUM SERPL-MCNC: 7.7 MG/DL (ref 8.8–10.2)
CALCIUM SERPL-MCNC: 7.9 MG/DL (ref 8.8–10.2)
CALCIUM SERPL-MCNC: 8.1 MG/DL (ref 8.8–10.2)
CALCIUM SERPL-MCNC: 8.1 MG/DL (ref 8.8–10.2)
CHLORIDE SERPL-SCNC: 101 MMOL/L (ref 98–107)
CHLORIDE SERPL-SCNC: 103 MMOL/L (ref 98–107)
CHLORIDE SERPL-SCNC: 104 MMOL/L (ref 98–107)
CHLORIDE SERPL-SCNC: 105 MMOL/L (ref 98–107)
CREAT SERPL-MCNC: 0.93 MG/DL (ref 0.67–1.17)
CREAT SERPL-MCNC: 0.94 MG/DL (ref 0.67–1.17)
CREAT SERPL-MCNC: 0.96 MG/DL (ref 0.67–1.17)
CREAT SERPL-MCNC: 1.01 MG/DL (ref 0.67–1.17)
DEPRECATED HCO3 PLAS-SCNC: 20 MMOL/L (ref 22–29)
DEPRECATED HCO3 PLAS-SCNC: 22 MMOL/L (ref 22–29)
DEPRECATED HCO3 PLAS-SCNC: 22 MMOL/L (ref 22–29)
DEPRECATED HCO3 PLAS-SCNC: 24 MMOL/L (ref 22–29)
EGFRCR SERPLBLD CKD-EPI 2021: 83 ML/MIN/1.73M2
EGFRCR SERPLBLD CKD-EPI 2021: 88 ML/MIN/1.73M2
EGFRCR SERPLBLD CKD-EPI 2021: 90 ML/MIN/1.73M2
EGFRCR SERPLBLD CKD-EPI 2021: >90 ML/MIN/1.73M2
ERYTHROCYTE [DISTWIDTH] IN BLOOD BY AUTOMATED COUNT: 12.4 % (ref 10–15)
GLUCOSE BLDC GLUCOMTR-MCNC: 114 MG/DL (ref 70–99)
GLUCOSE BLDC GLUCOMTR-MCNC: 116 MG/DL (ref 70–99)
GLUCOSE BLDC GLUCOMTR-MCNC: 146 MG/DL (ref 70–99)
GLUCOSE BLDC GLUCOMTR-MCNC: 155 MG/DL (ref 70–99)
GLUCOSE BLDC GLUCOMTR-MCNC: 169 MG/DL (ref 70–99)
GLUCOSE BLDC GLUCOMTR-MCNC: 177 MG/DL (ref 70–99)
GLUCOSE BLDC GLUCOMTR-MCNC: 178 MG/DL (ref 70–99)
GLUCOSE BLDC GLUCOMTR-MCNC: 184 MG/DL (ref 70–99)
GLUCOSE BLDC GLUCOMTR-MCNC: 189 MG/DL (ref 70–99)
GLUCOSE BLDC GLUCOMTR-MCNC: 213 MG/DL (ref 70–99)
GLUCOSE BLDC GLUCOMTR-MCNC: 224 MG/DL (ref 70–99)
GLUCOSE BLDC GLUCOMTR-MCNC: 246 MG/DL (ref 70–99)
GLUCOSE BLDC GLUCOMTR-MCNC: 297 MG/DL (ref 70–99)
GLUCOSE BLDC GLUCOMTR-MCNC: 300 MG/DL (ref 70–99)
GLUCOSE BLDC GLUCOMTR-MCNC: 316 MG/DL (ref 70–99)
GLUCOSE SERPL-MCNC: 129 MG/DL (ref 70–99)
GLUCOSE SERPL-MCNC: 133 MG/DL (ref 70–99)
GLUCOSE SERPL-MCNC: 172 MG/DL (ref 70–99)
GLUCOSE SERPL-MCNC: 288 MG/DL (ref 70–99)
HCT VFR BLD AUTO: 42 % (ref 40–53)
HGB BLD-MCNC: 13.9 G/DL (ref 13.3–17.7)
HOLD SPECIMEN: NORMAL
HOLD SPECIMEN: NORMAL
MCH RBC QN AUTO: 30.6 PG (ref 26.5–33)
MCHC RBC AUTO-ENTMCNC: 33.1 G/DL (ref 31.5–36.5)
MCV RBC AUTO: 93 FL (ref 78–100)
OSMOLALITY SERPL: 292 MMOL/KG (ref 280–301)
PLATELET # BLD AUTO: 128 10E3/UL (ref 150–450)
POTASSIUM SERPL-SCNC: 3.8 MMOL/L (ref 3.4–5.3)
POTASSIUM SERPL-SCNC: 4.2 MMOL/L (ref 3.4–5.3)
POTASSIUM SERPL-SCNC: 4.7 MMOL/L (ref 3.4–5.3)
POTASSIUM SERPL-SCNC: 4.8 MMOL/L (ref 3.4–5.3)
PTH-INTACT SERPL-MCNC: 32 PG/ML (ref 15–65)
RBC # BLD AUTO: 4.54 10E6/UL (ref 4.4–5.9)
SODIUM SERPL-SCNC: 134 MMOL/L (ref 135–145)
SODIUM SERPL-SCNC: 136 MMOL/L (ref 135–145)
SODIUM SERPL-SCNC: 138 MMOL/L (ref 135–145)
SODIUM SERPL-SCNC: 138 MMOL/L (ref 135–145)
WBC # BLD AUTO: 10.5 10E3/UL (ref 4–11)

## 2023-12-21 PROCEDURE — 250N000012 HC RX MED GY IP 250 OP 636 PS 637: Performed by: INTERNAL MEDICINE

## 2023-12-21 PROCEDURE — 85027 COMPLETE CBC AUTOMATED: CPT | Performed by: STUDENT IN AN ORGANIZED HEALTH CARE EDUCATION/TRAINING PROGRAM

## 2023-12-21 PROCEDURE — 80048 BASIC METABOLIC PNL TOTAL CA: CPT | Performed by: STUDENT IN AN ORGANIZED HEALTH CARE EDUCATION/TRAINING PROGRAM

## 2023-12-21 PROCEDURE — 82962 GLUCOSE BLOOD TEST: CPT

## 2023-12-21 PROCEDURE — 82040 ASSAY OF SERUM ALBUMIN: CPT | Performed by: INTERNAL MEDICINE

## 2023-12-21 PROCEDURE — 250N000013 HC RX MED GY IP 250 OP 250 PS 637: Performed by: STUDENT IN AN ORGANIZED HEALTH CARE EDUCATION/TRAINING PROGRAM

## 2023-12-21 PROCEDURE — 258N000003 HC RX IP 258 OP 636: Performed by: STUDENT IN AN ORGANIZED HEALTH CARE EDUCATION/TRAINING PROGRAM

## 2023-12-21 PROCEDURE — 99233 SBSQ HOSP IP/OBS HIGH 50: CPT | Performed by: INTERNAL MEDICINE

## 2023-12-21 PROCEDURE — 250N000011 HC RX IP 250 OP 636: Performed by: EMERGENCY MEDICINE

## 2023-12-21 PROCEDURE — 82010 KETONE BODYS QUAN: CPT | Performed by: STUDENT IN AN ORGANIZED HEALTH CARE EDUCATION/TRAINING PROGRAM

## 2023-12-21 PROCEDURE — 36415 COLL VENOUS BLD VENIPUNCTURE: CPT | Performed by: STUDENT IN AN ORGANIZED HEALTH CARE EDUCATION/TRAINING PROGRAM

## 2023-12-21 PROCEDURE — 200N000001 HC R&B ICU

## 2023-12-21 PROCEDURE — 250N000011 HC RX IP 250 OP 636: Performed by: STUDENT IN AN ORGANIZED HEALTH CARE EDUCATION/TRAINING PROGRAM

## 2023-12-21 PROCEDURE — 82306 VITAMIN D 25 HYDROXY: CPT | Performed by: STUDENT IN AN ORGANIZED HEALTH CARE EDUCATION/TRAINING PROGRAM

## 2023-12-21 PROCEDURE — 258N000003 HC RX IP 258 OP 636: Performed by: INTERNAL MEDICINE

## 2023-12-21 PROCEDURE — 250N000009 HC RX 250: Performed by: STUDENT IN AN ORGANIZED HEALTH CARE EDUCATION/TRAINING PROGRAM

## 2023-12-21 RX ORDER — SODIUM CHLORIDE 9 MG/ML
INJECTION, SOLUTION INTRAVENOUS CONTINUOUS
Status: DISCONTINUED | OUTPATIENT
Start: 2023-12-21 | End: 2023-12-22

## 2023-12-21 RX ADMIN — POTASSIUM CHLORIDE AND SODIUM CHLORIDE: 900; 150 INJECTION, SOLUTION INTRAVENOUS at 00:18

## 2023-12-21 RX ADMIN — CEFTRIAXONE SODIUM 1 G: 1 INJECTION, POWDER, FOR SOLUTION INTRAMUSCULAR; INTRAVENOUS at 00:03

## 2023-12-21 RX ADMIN — ONDANSETRON 4 MG: 2 INJECTION INTRAMUSCULAR; INTRAVENOUS at 00:12

## 2023-12-21 RX ADMIN — ENOXAPARIN SODIUM 40 MG: 40 INJECTION SUBCUTANEOUS at 21:36

## 2023-12-21 RX ADMIN — ACETAMINOPHEN 650 MG: 325 TABLET ORAL at 08:32

## 2023-12-21 RX ADMIN — POTASSIUM CHLORIDE, DEXTROSE MONOHYDRATE AND SODIUM CHLORIDE: 150; 5; 450 INJECTION, SOLUTION INTRAVENOUS at 07:33

## 2023-12-21 RX ADMIN — PROCHLORPERAZINE EDISYLATE 5 MG: 5 INJECTION INTRAMUSCULAR; INTRAVENOUS at 00:34

## 2023-12-21 RX ADMIN — INSULIN GLARGINE 7 UNITS: 100 INJECTION, SOLUTION SUBCUTANEOUS at 11:55

## 2023-12-21 RX ADMIN — ONDANSETRON 4 MG: 2 INJECTION INTRAMUSCULAR; INTRAVENOUS at 08:32

## 2023-12-21 RX ADMIN — INSULIN ASPART 3 UNITS: 100 INJECTION, SOLUTION INTRAVENOUS; SUBCUTANEOUS at 21:28

## 2023-12-21 RX ADMIN — INSULIN HUMAN 4 UNITS/HR: 1 INJECTION, SOLUTION INTRAVENOUS at 00:14

## 2023-12-21 RX ADMIN — OSELTAMIVIR PHOSPHATE 75 MG: 75 CAPSULE ORAL at 21:36

## 2023-12-21 RX ADMIN — ACETAMINOPHEN 650 MG: 325 TABLET ORAL at 21:22

## 2023-12-21 RX ADMIN — POTASSIUM CHLORIDE AND SODIUM CHLORIDE: 900; 150 INJECTION, SOLUTION INTRAVENOUS at 04:35

## 2023-12-21 RX ADMIN — SODIUM CHLORIDE: 9 INJECTION, SOLUTION INTRAVENOUS at 10:48

## 2023-12-21 RX ADMIN — OSELTAMIVIR PHOSPHATE 75 MG: 75 CAPSULE ORAL at 11:56

## 2023-12-21 ASSESSMENT — ACTIVITIES OF DAILY LIVING (ADL)
ADLS_ACUITY_SCORE: 37
ADLS_ACUITY_SCORE: 41
ADLS_ACUITY_SCORE: 38
ADLS_ACUITY_SCORE: 38
ADLS_ACUITY_SCORE: 41
ADLS_ACUITY_SCORE: 38
ADLS_ACUITY_SCORE: 38
DEPENDENT_IADLS:: INDEPENDENT
ADLS_ACUITY_SCORE: 37
ADLS_ACUITY_SCORE: 37

## 2023-12-21 NOTE — ED PROVIDER NOTES
EMERGENCY DEPARTMENT ENCOUNTER      NAME: Shaq Ny  AGE: 63 year old male  YOB: 1960  MRN: [id number]  EVALUATION DATE & TIME: 12/20/2023  7:16 PM    [facility]    ED PROVIDER: Niranjan Merritt MD        Chief Complaint   Patient presents with    Generalized Weakness    Flu Symptoms         FINAL IMPRESSION:  1. Sepsis with acute hypoxic respiratory failure without septic shock, due to unspecified organism (H)    2. Diabetic ketoacidosis without coma associated with type 1 diabetes mellitus (H)          ED COURSE & MEDICAL DECISION MAKING:    Pertinent Labs & Imaging studies reviewed. (See chart for details)  63 year old male presents to the Emergency Department for evaluation of throat, cough, body aches, productive cough, hypoxemia and generalized weakness    Patient does smoke and states he has not taken his insulin for a few days.  States today he became aggressively weaker therefore he called 911.  Blood sugar was high for paramedics.  He is febrile.  He thinks took Tylenol few hours ago but is not exactly sure.    patient is ill-appearing with dry mucous membranes, he is on 4 L of oxygen, his productive cough, bilateral rhonchi, febrile and tachycardic concerning for sepsis likely community-acquired pneumonia.  Patient is normally not on oxygen.  Denies any inhalers.  Smokes 1 pack a day.  Denies any abdominal pain.  Denies any open sores or wounds.    Considered COVID, pneumonia, influenza, ACS, DKA    Critically high procalcitonin as well as ketones    Anion gap closed minimally acidotic    And is ill-appearing and is currently on 2 L of oxygen but has normal blood pressure and normal heart rates and was given ceftriaxone azithromycin for possible community-acquired pneumonia as well as DuoNeb.  Given Tamiflu for influenza      ED Course as of 12/20/23 2229   Wed Dec 20, 2023   2209 He is not acidotic on his VBG he is minimally acidotic with a closed anion gap  metabolic panel.  Given 5 units aspart.  Discussed with the hospitalist he wants patient's on ICU status for insulin infusion which seems reasonable the patient is ill-appearing   2209 Patient has influenza A which is likely driving a lot of his process his tobacco use and probably has some undiagnosed COPD.  With elevated procalcitonin blood cultures were sent as well as given antibiotics to cover for commune acquired pneumonia with ceftriaxone and azithromycin   2210 Ph Venous: 7.36   2210 PCO2 Venous: 40   2210 PO2 Venous(!): 60   2210 Only on 2 L of oxygen.  He does not appear tachypneic as much as the nurses are indicating on the vital signs       7:59 PM I met with the patient to obtain patient history and performed a physical exam. Discussed plan for ED work up including potential diagnostic studies and interventions.  9:47 PM Reevaluated and updated patient with findings.  10:05 PM Spoke with hospitalist Dr. Martines who wants patient to be admitted in the ICU. I spoke with the charge nurse who states that the hospitalist needs to talk to the intensivist for ICU admission based on conversation with house supervisor      Medical Decision Making    History:  Supplemental history from: Documented in chart, if applicable  External Record(s) reviewed: Documented in chart, if applicable.    Work Up:  Chart documentation includes differential considered and any EKGs or imaging independently interpreted by provider, where specified.  In additional to work up documented, I considered the following work up: Documented in chart, if applicable.    External consultation:  Discussion of management with another provider: Hospitalist and Intensivist    Complicating factors:  Care impacted by chronic illness: Diabetes, Hypertension, and Smoking / Nicotine Use  Care affected by social determinants of health: Access to Medical Care    Disposition considerations: Admit.          Voice recognition software was used in the  creation of this note. Any grammatical or nonsensical errors are due to inherent errors with the software and are not the intention of the writer.         At the conclusion of the encounter I discussed the results of all of the tests and the disposition. The questions were answered. The patient or family acknowledged understanding and was agreeable with the care plan.         The patient is critically ill and has required 45 minutes of critical care time exclusive of procedures. This includes time spent interviewing the patient, ordering tests and medications, monitoring vital signs, reviewing results, patient updates, discussing the case with family and consultants, and admission.      MEDICATIONS GIVEN IN THE EMERGENCY:  Medications   sodium chloride (PF) 0.9% PF flush 3 mL (has no administration in time range)   sodium chloride (PF) 0.9% PF flush 3 mL (has no administration in time range)   ipratropium - albuterol 0.5 mg/2.5 mg/3 mL (DUONEB) neb solution 3 mL (has no administration in time range)   cefTRIAXone (ROCEPHIN) 1 g vial to attach to  mL bag for ADULTS or NS 50 mL bag for PEDS (has no administration in time range)   azithromycin (ZITHROMAX) 500 mg in sodium chloride 0.9 % 250 mL intermittent infusion (has no administration in time range)   sodium chloride 0.9% BOLUS 1,000 mL (has no administration in time range)   insulin regular (MYXREDLIN) 1 unit/mL infusion (has no administration in time range)   glucose gel 15-30 g (has no administration in time range)     Or   dextrose 50 % injection 25-50 mL (has no administration in time range)     Or   glucagon injection 1 mg (has no administration in time range)   enoxaparin ANTICOAGULANT (LOVENOX) injection 40 mg (has no administration in time range)   0.9% sodium chloride + KCl 20 mEq/L infusion (has no administration in time range)   dextrose 5% and 0.45% NaCl + KCl 20 mEq/L infusion (has no administration in time range)   lidocaine 1 % 0.1-1 mL (has no  "administration in time range)   lidocaine (LMX4) cream (has no administration in time range)   sodium chloride (PF) 0.9% PF flush 3 mL (has no administration in time range)   sodium chloride (PF) 0.9% PF flush 3 mL (has no administration in time range)   Patient is already receiving anticoagulation with heparin, enoxaparin (LOVENOX), warfarin (COUMADIN)  or other anticoagulant medication (has no administration in time range)   acetaminophen (TYLENOL) tablet 650 mg (has no administration in time range)     Or   acetaminophen (TYLENOL) Suppository 650 mg (has no administration in time range)   bisacodyl (DULCOLAX) suppository 10 mg (has no administration in time range)   polyethylene glycol (MIRALAX) Packet 17 g (has no administration in time range)   ondansetron (ZOFRAN ODT) ODT tab 4 mg (has no administration in time range)     Or   ondansetron (ZOFRAN) injection 4 mg (has no administration in time range)   prochlorperazine (COMPAZINE) injection 5 mg (has no administration in time range)     Or   prochlorperazine (COMPAZINE) tablet 5 mg (has no administration in time range)     Or   prochlorperazine (COMPAZINE) suppository 12.5 mg (has no administration in time range)   famotidine (PEPCID) tablet 20 mg (has no administration in time range)   oseltamivir (TAMIFLU) capsule 75 mg (has no administration in time range)   sodium chloride 0.9% BOLUS 1,000 mL (1,000 mLs Intravenous $New Bag 12/20/23 2028)   acetaminophen (TYLENOL) tablet 650 mg (650 mg Oral $Given 12/20/23 2113)       NEW PRESCRIPTIONS STARTED AT TODAY'S ER VISIT  New Prescriptions    No medications on file          =================================================================    HPI    Triage note  \"Pt to ED via [facility] from home where he lives with his mother and helps care for her. Pt complains of 1 day increasing weakness, fever, body aches and cough. States his mother is also currently sick at home. Negative home covid tests. Medics found the " "pt hypoxic with SpO2 of 89% on RA. SpO2 improved with O2 via NC. . Pt know diabetic. Hasn't been taking his prescribed medications for the last day.   Denies chest pain, sob, diff breathing.  A/ox4 in triage. Has frequent groans during triage and appears uncomfortable. Has not had any tylenol today. Endorses nausea but denies nausea today  750 ml normal saline bolus administered by medics     Triage Assessment (Adult)       Row Name 12/20/23 1715          Triage Assessment    Airway WDL WDL        Respiratory WDL    Respiratory WDL X;cough        Cardiac WDL    Cardiac WDL WDL        Peripheral/Neurovascular WDL    Peripheral Neurovascular WDL WDL        Cognitive/Neuro/Behavioral WDL    Cognitive/Neuro/Behavioral WDL WDL                     \"      Patient information was obtained from: patient    Use of : N/A         Shaq Ny is a 63 year old male with a pertinent history of HTN, DM1, partial nephrectomy (right), who presents to this ED via EMS for evaluation of generalized weakness and flu symptoms.     Reports he has not felt well for roughly a week.  Does do insulin.  States he usually smokes 1 pack/day.  Is not taking insulin a few days.  Thinks he had some Tylenol today.  Normally not on oxygen.    Brought in by EMS.      REVIEW OF SYSTEMS   Review of Systems     PAST MEDICAL HISTORY:  Past Medical History:   Diagnosis Date    Diabetes mellitus (H)     insulin dependent, onset age 30s    History of nephrectomy     Spinal stenosis        PAST SURGICAL HISTORY:  Past Surgical History:   Procedure Laterality Date    BLADDER SURGERY      4th grade    FUSION CERVICAL ANTERIOR TWO LEVELS N/A 8/10/2022    Procedure: Anterior cervical discectomy and fusion at cervical 5 to cervical 6 and cervical 6 to cervical 7 for spinal stenosis and left cervical 7 radiculopathy;  Surgeon: Eric Quiroz MD;  Location: [facility] OR    KIDNEY SURGERY Left     kidney removed in 4th grade    KIDNEY " SURGERY Right     partial removal in 4th grade, no hx of transplants           CURRENT MEDICATIONS:    insulin regular 100 UNIT/ML vial        ALLERGIES:  Allergies   Allergen Reactions    Oxycodone Other (See Comments) and Nausea     Mood disturbances        FAMILY HISTORY:  Family History   Problem Relation Age of Onset    Coronary Artery Disease Mother     Coronary Artery Disease Father     Coronary Artery Disease Brother        SOCIAL HISTORY:   Social History     Socioeconomic History    Marital status:    Tobacco Use    Smoking status: Some Days     Packs/day: 1.00     Years: 50.00     Additional pack years: 0.00     Total pack years: 50.00     Types: Cigarettes    Smokeless tobacco: Never   Substance and Sexual Activity    Alcohol use: Not Currently    Drug use: Never       VITALS:  /59   Pulse 82   Temp (S) (!) 102.1  F (38.9  C) (Axillary)   Resp (!) 37   Wt 79.4 kg (175 lb)   SpO2 97%   BMI 24.41 kg/m      PHYSICAL EXAM      Vitals: /59   Pulse 82   Temp (S) (!) 102.1  F (38.9  C) (Axillary)   Resp (!) 37   Wt 79.4 kg (175 lb)   SpO2 97%   BMI 24.41 kg/m    General: Drowsy, ill-appearing  Eyes: Conjunctivae non-injected. Sclera anicteric.  HENT: Atraumatic.  Neck: Supple.  Respiratory/Chest: Mild increased work of breathing, bilateral rhonchi, 3 L oxygen nasal cannula, productive cough  Heart: Tachycardic, 2+ radial pulses  Abdomen: non distended  Musculoskeletal: Normal extremities. No edema or erythema.  Clubbing of fingers  Skin: Normal color. No rash or diaphoresis.  Neurologic: Face symmetric, moves all extremities spontaneously. Speech clear.  Psychiatric: Oriented to person, place, and time. Affect appropriate.       LAB:  All pertinent labs reviewed and interpreted.  Results for orders placed or performed during the hospital encounter of 12/20/23   Chest XR,  PA & LAT    Impression    IMPRESSION: There some stable fibrotic change in the left lung base and right  midlung fairly similar to prior imaging. No convincing evidence for acute new findings.   Symptomatic Influenza A/B, RSV, & SARS-CoV2 PCR (COVID-19) Nasopharyngeal    Specimen: Nasopharyngeal; Swab   Result Value Ref Range    Influenza A PCR Positive (A) Negative    Influenza B PCR Negative Negative    RSV PCR Negative Negative    SARS CoV2 PCR Negative Negative   Extra Blood Culture Bottle   Result Value Ref Range    Hold Specimen JIC    Extra Green Top (Lithium Heparin) Tube   Result Value Ref Range    Hold Specimen JIC    Extra Green Top (Lithium Heparin) Tube   Result Value Ref Range    Hold Specimen JIC    Extra Purple Top Tube   Result Value Ref Range    Hold Specimen JIC    Comprehensive metabolic panel   Result Value Ref Range    Sodium 128 (L) 135 - 145 mmol/L    Potassium 4.0 3.4 - 5.3 mmol/L    Carbon Dioxide (CO2) 21 (L) 22 - 29 mmol/L    Anion Gap 15 7 - 15 mmol/L    Urea Nitrogen 17.5 8.0 - 23.0 mg/dL    Creatinine 1.00 0.67 - 1.17 mg/dL    GFR Estimate 84 >60 mL/min/1.73m2    Calcium 8.3 (L) 8.8 - 10.2 mg/dL    Chloride 92 (L) 98 - 107 mmol/L    Glucose 324 (H) 70 - 99 mg/dL    Alkaline Phosphatase 78 40 - 150 U/L    AST 37 0 - 45 U/L    ALT 15 0 - 70 U/L    Protein Total 6.5 6.4 - 8.3 g/dL    Albumin 3.8 3.5 - 5.2 g/dL    Bilirubin Total 0.5 <=1.2 mg/dL   Lactic acid whole blood   Result Value Ref Range    Lactic Acid 1.1 0.7 - 2.0 mmol/L   Result Value Ref Range    Procalcitonin 5.89 (HH) <0.50 ng/mL   Result Value Ref Range    Lipase 11 (L) 13 - 60 U/L   Blood gas venous   Result Value Ref Range    pH Venous 7.36 7.35 - 7.45    pCO2 Venous 40 35 - 50 mm Hg    pO2 Venous 60 (H) 25 - 47 mm Hg    Bicarbonate Venous 23 (L) 24 - 30 mmol/L    Base Excess/Deficit -2.5   mmol/L    Oxyhemoglobin Venous 90.5 (H) 70.0 - 75.0 %    O2 Sat, Venous 91.8 (H) 70.0 - 75.0 %   Ketone Beta-Hydroxybutyrate Quantitative   Result Value Ref Range    Ketone (Beta-Hydroxybutyrate) Quantitative 2.30 (HH) <=0.30 mmol/L   CBC  with platelets and differential   Result Value Ref Range    WBC Count 10.2 4.0 - 11.0 10e3/uL    RBC Count 4.92 4.40 - 5.90 10e6/uL    Hemoglobin 15.1 13.3 - 17.7 g/dL    Hematocrit 44.0 40.0 - 53.0 %    MCV 89 78 - 100 fL    MCH 30.7 26.5 - 33.0 pg    MCHC 34.3 31.5 - 36.5 g/dL    RDW 12.3 10.0 - 15.0 %    Platelet Count 143 (L) 150 - 450 10e3/uL    % Neutrophils 90 %    % Lymphocytes 3 %    % Monocytes 7 %    % Eosinophils 0 %    % Basophils 0 %    % Immature Granulocytes 0 %    NRBCs per 100 WBC 0 <1 /100    Absolute Neutrophils 9.1 (H) 1.6 - 8.3 10e3/uL    Absolute Lymphocytes 0.4 (L) 0.8 - 5.3 10e3/uL    Absolute Monocytes 0.7 0.0 - 1.3 10e3/uL    Absolute Eosinophils 0.0 0.0 - 0.7 10e3/uL    Absolute Basophils 0.0 0.0 - 0.2 10e3/uL    Absolute Immature Granulocytes 0.0 <=0.4 10e3/uL    Absolute NRBCs 0.0 10e3/uL       RADIOLOGY:  Reviewed all pertinent imaging. Please see official radiology report.  Chest XR,  PA & LAT   Final Result   IMPRESSION: There some stable fibrotic change in the left lung base and right midlung fairly similar to prior imaging. No convincing evidence for acute new findings.          EKG:    None    PROCEDURES:   None      I, Betsy Dillard, am serving as a scribe to document services personally performed by Chilo Merritt MD based on my observation and the provider's statements to me. I, Dr. Chilo Merritt, attest that Betsy Dillard is acting in a scribe capacity, has observed my performance of the services and has documented them in accordance with my direction.    Chilo Merritt MD  Emergency Medicine  [facility] EMERGENCY DEPARTMENT  [address] [place] [id number] [phone]       Chilo Merritt MD  12/20/23 221       Chilo Merritt MD  12/20/23 4969

## 2023-12-21 NOTE — PROGRESS NOTES
[facility]    Medicine Progress Note - Hospitalist Service    Date of Admission:  12/20/2023    Assessment & Plan      Shaq Ny is a 63M presents with generalised weakness, nausea; pmhx inlcudes DM1 with neuropathy/retinopathy, HTN/HLD, tobacco dependence, R cervical radiculopathy with arm weakness, nephrectomy; admitted for DKA, influenza A infection.    Influenza A  Acute Bronchitis  -Chest xray 12/20: negative for acute findings  -CBC trend  -olsetamivir 75mg PO BID  -started on Ceftriaxone 1g IV every day, azithromycin 250mg PO every day for concern for CAP- will discontinue as no pneumonia on CXR    DKA- resolved  -Elevated Ketones withOUT overt acidosis. Anticipated progression of DKA given acute illness.  -Presents in DKA. Anticipated trigger etiolog(ies) influenza, insulin vacation in setting of viral illness/DM1.  -telemetry  -A1c 9.0, previously 9.4 (1/22)  -on insulin drip, will transition to subQ insulin with 2 hour overlap  - initially on 1/2NS @ 250ml/hr, then D5 1/2NS with K @ 150ml/hr, will continue fluids with NS at 75 mL/hour  -DM educator  -subcutaneous insulin prior to discharge  -mod consistent carb diet  -hypoglycemic protocol in place    Hypocalcemia  -Vit D/PTH/albumin  -CMP trend    Hyponatremia  Hypochloremia  Suspect hypovolemic hyponatremia  -IVF continued  -serum/urine osm, urine Na - pending  -Urine Na    Tobacco dependence  Current 1ppd cigarette usage. Declines NRT on initial discussion. Discussed for 3 minutes during admission evaluation.  -discuss chantix prior to discharge  -PCP follow up          Diet: Moderate Consistent Carb (60 g CHO per Meal) Diet    DVT Prophylaxis: Enoxaparin (Lovenox) SQ  Francisoc Catheter: Not present  Lines: None     Cardiac Monitoring: ACTIVE order. Indication: DKA  Code Status: Full Code      Clinically Significant Risk Factors Present on Admission         # Hyponatremia: Lowest Na = 128 mmol/L in last 2 days, will monitor  as appropriate  # Hypocalcemia: Lowest Ca = 7.7 mg/dL in last 2 days, will monitor and replace as appropriate   # Hypomagnesemia: Lowest Mg = 1.5 mg/dL in last 2 days, will replace as needed     # Thrombocytopenia: Lowest platelets = 128 in last 2 days, will monitor for bleeding       # DMII: A1C = 9.0 % (Ref range: <5.7 %) within past 6 months              Disposition Plan      Expected Discharge Date: 12/22/2023                    Alena Archuleta MD  Hospitalist Service  [facility]  Securely message with [employer] (more info)  Text page via [facility] Paging/Directory   ______________________________________________________________________    Interval History   Mr. Ny is sleeping this morning because he did nto sleep overnight. He is not feeling well. He has not specific complaints. Discussed DKA, this is the first time he believes he has had DKA. He is not on insulin outpatient. Now he is off insulin drip will transfer out of ICU.     Physical Exam   Vital Signs: Temp: 100.1  F (37.8  C) Temp src: Oral BP: 113/56 Pulse: 73   Resp: 28 SpO2: 95 % O2 Device: Nasal cannula Oxygen Delivery: 3 LPM  Weight: 175 lbs 0 oz    General Appearance: Awake, alert, in no acute distress  Respiratory: CTAB, no wheeze  Cardiovascular: RRR, no murmur noted  GI: soft, nontender, non distended, normal bowel sounds  Skin: no jaundice, no rash      Medical Decision Making       50 MINUTES SPENT BY ME on the date of service doing chart review, history, exam, documentation & further activities per the note.      Data     I have personally reviewed the following data over the past 24 hrs:    10.5  \   13.9   / 128 (L)     138 104 19.3 /  224 (H)   4.8 24 1.01 \       ALT: N/A AST: N/A AP: N/A TBILI: N/A   ALB: 3.2 (L) TOT PROTEIN: N/A LIPASE: N/A       TSH: N/A T4: N/A A1C: N/A       Procal: N/A CRP: N/A Lactic Acid: 1.1         Imaging results reviewed over the past 24 hrs:   Recent Results (from the past 24  hour(s))   Chest XR,  PA & LAT    Narrative    EXAM: XR CHEST 2 VIEWS  LOCATION: [facility]  DATE: 12/20/2023    INDICATION: fever, cough, hypoxic  COMPARISON: 01/30/2022 chest x-ray and CT chest 01/30/2022.      Impression    IMPRESSION: There some stable fibrotic change in the left lung base and right midlung fairly similar to prior imaging. No convincing evidence for acute new findings.

## 2023-12-21 NOTE — H&P
[facility]    History and Physical - Hospitalist Service       Date of Admission:  12/20/2023    Assessment & Plan      Shaq Ny is a 63M presents with generalised weakness, nausea; pmhx inlcudes DM1 with neuropathy/retinopathy, HTN/HLD, tobacco dependence, R cervical radiculopathy with arm weakness, nephrectomy; admitted for DKA, influenza A infection.    #influenza A  -CBC trend  -olsetamivir 75mg PO BID  -IVF per DKA protocols, transition to oral intake as tolerated    #DKA  Elevated Ketones withOUT overt acidosis. Anticipated progression of DKA given acute illness.  Presents in DKA. Anticipated trigger etiolog(ies) influenza, insulin vacation in setting of viral illness/DM1.  -telemetry  -EKG pending  -A1c pending, previously 9.4 (1/22)  -ketone q2h  -BMP q2h  -insulin gtt, start 0.1u/kg/hr, decrease to 0.05u/kg/hr once glucose <200  -1/2NS @ 250ml/hr until glucose <200 THEN  -D5 1/2NS with K @ 150ml/hr  -transition to subcutaneous insulin with gap closure (<14 and acidosis/ketone resolution)  -DM educator  -subcutaneous insulin prior to discharge    #pneumonia, suspected  Possible community acquired pnuemonia.  -Ceftriaxone 1g IV every day  -azithromycin 250mg PO every day     #hypocalcemia  -Vit D/PTH  -CMP trend    #hyponatremia  #hypochloremia  Suspect hypovolemic hyponatremia  -IVF per DKA protocols  -serum/urine osm  -Urine Na    #tobacco dependence  Current 1ppd cigarette usage. Declines NRT on initial discussion. Discussed for 3 minutes during admission evaluation.  -discuss chantix prior to discharge  -PCP follow up          Diet: NPO for Medical/Clinical Reasons Except for: Ice Chips, Meds  DVT Prophylaxis: Enoxaparin (Lovenox) SQ  Francisco Catheter: Not present  Lines: None     Cardiac Monitoring: ACTIVE order. Indication: DKA  Code Status: Full Code    Clinically Significant Risk Factors Present on Admission         # Hyponatremia: Lowest Na = 128 mmol/L in last 2 days,  will monitor as appropriate  # Hypocalcemia: Lowest Ca = 8.3 mg/dL in last 2 days, will monitor and replace as appropriate                         Disposition Plan      Expected Discharge Date: 12/22/2023                  Chad Martines MD  Hospitalist Service  [facility]  Securely message with [employer] (more info)  Text page via [facility] Paging/Directory     ______________________________________________________________________    Chief Complaint   Weakness, nausea    History is obtained from the patient    History of Present Illness   Shaq Ny is a 63M presents with generalised weakness, nausea; pmhx inlcudes DM1 with neuropathy/retinopathy, HTN/HLD, tobacco dependence, R cervical radiculopathy with arm weakness, nephrectomy; admitted for DKA, influenza A infection.    Presents after 2 to 3 days of generalized weakness, arthralgias/myalgias, nausea.  Generally is able to take insulin as directed, however, due to his malaise and illness--has not taken his insulin for the past 48 to 72 hours.  Associated generalized abdominal pain.      Past Medical History    Past Medical History:   Diagnosis Date    Diabetes mellitus (H)     insulin dependent, onset age 30s    History of nephrectomy     Spinal stenosis        Past Surgical History   Past Surgical History:   Procedure Laterality Date    BLADDER SURGERY      4th grade    FUSION CERVICAL ANTERIOR TWO LEVELS N/A 8/10/2022    Procedure: Anterior cervical discectomy and fusion at cervical 5 to cervical 6 and cervical 6 to cervical 7 for spinal stenosis and left cervical 7 radiculopathy;  Surgeon: Eric Quiroz MD;  Location: [facility] OR    KIDNEY SURGERY Left     kidney removed in 4th grade    KIDNEY SURGERY Right     partial removal in 4th grade, no hx of transplants       Prior to Admission Medications   Prior to Admission Medications   Prescriptions Last Dose Informant Patient Reported? Taking?   insulin regular 100 UNIT/ML  vial   Yes Yes   Sig: Inject 1-5 Units Subcutaneous See Admin Instructions Take 30 minutes before meals. Sliding scale of 1 unit per 50 mg/dL Glucose over 150 mg/dL. 151-200: take 1 unit. 201-250: take 2 units. 251-300: take 3 units. 301-350: take 4 units. Over 351, take 5 units. Total Daily dose 25 units.      Facility-Administered Medications: None        Review of Systems    The 10 point Review of Systems is negative other than noted in the HPI or here.      Physical Exam   Vital Signs: Temp: (S) (!) 102.1  F (38.9  C) Temp src: Axillary BP: 129/59 Pulse: 82   Resp: (!) 37 SpO2: 97 % O2 Device: Nasal cannula Oxygen Delivery: (S) 4 LPM  Weight: 175 lbs 0 oz    Constitutional: awake, alert, cooperative, acute distress, and appears stated age  Respiratory: CTAB  Cardiovascular: RRR no m/g/r  GI: soft, nontender,nondistended  Musculoskeletal: shoulder/elbow flexion/extension 5/5 bilaterally and symmetric  Neurologic: AOx4, no focal deficits    Medical Decision Making       45 MINUTES SPENT BY ME on the date of service doing chart review, history, exam, documentation & further activities per the note.  MANAGEMENT DISCUSSED with the following over the past 24 hours: patient   NOTE(S)/MEDICAL RECORDS REVIEWED over the past 24 hours: outpatient FM, outpatient endocrine  Tests ORDERED & REVIEWED in the past 24 hours:  - See lab/imaging results included in the data section of the note      Data     I have personally reviewed the following data over the past 24 hrs:    10.2  \   15.1   / 143 (L)     128 (L) 92 (L) 17.5 /  324 (H)   4.0 21 (L) 1.00 \     ALT: 15 AST: 37 AP: 78 TBILI: 0.5   ALB: 3.8 TOT PROTEIN: 6.5 LIPASE: 11 (L)     Procal: 5.89 (HH) CRP: N/A Lactic Acid: 1.1         Imaging results reviewed over the past 24 hrs:   Recent Results (from the past 24 hour(s))   Chest XR,  PA & LAT    Narrative    EXAM: XR CHEST 2 VIEWS  LOCATION: [facility]  DATE: 12/20/2023    INDICATION: fever,  cough, hypoxic  COMPARISON: 01/30/2022 chest x-ray and CT chest 01/30/2022.      Impression    IMPRESSION: There some stable fibrotic change in the left lung base and right midlung fairly similar to prior imaging. No convincing evidence for acute new findings.

## 2023-12-21 NOTE — MEDICATION SCRIBE - ADMISSION MEDICATION HISTORY
Medication Scribe Admission Medication History    Admission medication history is complete. The information provided in this note is only as accurate as the sources available at the time of the update.    Information Source(s): Patient and CareEverywhere/SureScripts via in-person    Pertinent Information: Patient reports self management of medications. Patient unaware of his medications. Patient reports has not taken medications in awhile.     Insulin: Patient reports taking insulin sometime in the last month. Patient reports taking Insulin 70/30 as well as Regular Human. No Rx fills for Insulin 70/30 can be found in the past 12 months. Inquired of where patient picks up Insulin and patient reports at the [facility]. Called pharmacy at [phone]. They were closed so could not confirm last dispense of 7/30 insulin. Last dispense of Regular Humulin was 5/3/23. Discontinued 70/30 insulin based on dispense history and patient's unableness to recall of dosing amounts and date of administration.     Changes made to PTA medication list:  Added: Insulin Regular Human  Deleted: Tylenol, Gabapentin, Insulin 70/30, Lidocaine, Senna-d, Tramadol  Changed: None    Medication Affordability:  Not including over the counter (OTC) medications, was there a time in the past 3 months when you did not take your medications as prescribed because of cost?: No    Allergies reviewed with patient and updates made in EHR: yes    Medication History Completed By: Ganesh Kincaid 12/20/2023 9:03 PM    Current Facility-Administered Medications for the 12/20/23 encounter (Hospital Encounter)   Medication    sodium chloride 0.9% (bag) irrigation 1,000 mL    sodium chloride 0.9% (bag) irrigation 1,000 mL     PTA Med List   Medication Sig Last Dose    insulin regular 100 UNIT/ML vial Inject 1-5 Units Subcutaneous See Admin Instructions Take 30 minutes before meals. Sliding scale of 1 unit per 50 mg/dL Glucose over 150 mg/dL. 151-200: take 1 unit.  201-250: take 2 units. 251-300: take 3 units. 301-350: take 4 units. Over 351, take 5 units. Total Daily dose 25 units.

## 2023-12-21 NOTE — ED TRIAGE NOTES
Pt to ED via [facility] from home where he lives with his mother and helps care for her. Pt complains of 1 day increasing weakness, fever, body aches and cough. States his mother is also currently sick at home. Negative home covid tests. Medics found the pt hypoxic with SpO2 of 89% on RA. SpO2 improved with O2 via NC. . Pt know diabetic. Hasn't been taking his prescribed medications for the last day.   Denies chest pain, sob, diff breathing.  A/ox4 in triage. Has frequent groans during triage and appears uncomfortable. Has not had any tylenol today. Endorses nausea but denies nausea today  750 ml normal saline bolus administered by medics     Triage Assessment (Adult)       Row Name 12/20/23 9374          Triage Assessment    Airway WDL WDL        Respiratory WDL    Respiratory WDL X;cough        Cardiac WDL    Cardiac WDL WDL        Peripheral/Neurovascular WDL    Peripheral Neurovascular WDL WDL        Cognitive/Neuro/Behavioral WDL    Cognitive/Neuro/Behavioral WDL WDL

## 2023-12-21 NOTE — CONSULTS
Care Management Initial Consult    General Information  Assessment completed with: Patient, patient  Type of CM/SW Visit: Initial Assessment    Primary Care Provider verified and updated as needed: Yes   Readmission within the last 30 days:        Reason for Consult: discharge planning  Advance Care Planning:            Communication Assessment  Patient's communication style: spoken language (English or Bilingual)             Cognitive  Cognitive/Neuro/Behavioral: WDL                      Living Environment:   People in home: parent(s)     Current living Arrangements: house      Able to return to prior arrangements: yes       Family/Social Support:  Care provided by: self  Provides care for: parent(s)     Significant Other       [place]  Description of Support System: Supportive         Current Resources:   Patient receiving home care services: No     Community Resources: None  Equipment currently used at home:    Supplies currently used at home: None    Employment/Financial:  Employment Status:          Financial Concerns:             Does the patient's insurance plan have a 3 day qualifying hospital stay waiver?  Yes     Which insurance plan 3 day waiver is available? Alternative insurance waiver    Will the waiver be used for post-acute placement? Undetermined at this time    Lifestyle & Psychosocial Needs:  Social Determinants of Health     Food Insecurity: Not on file   Depression: Not at risk (3/27/2023)    PHQ-2     PHQ-2 Score: 2   Housing Stability: Not on file   Tobacco Use: High Risk (12/20/2023)    Patient History     Smoking Tobacco Use: Some Days     Smokeless Tobacco Use: Never     Passive Exposure: Not on file   Financial Resource Strain: Not on file   Alcohol Use: Not on file   Transportation Needs: Not on file   Physical Activity: Not on file   Interpersonal Safety: Not on file   Stress: Not on file   Social Connections: Not on file       Functional Status:  Prior to admission patient needed  assistance:   Dependent ADLs:: Independent  Dependent IADLs:: Independent       Mental Health Status:          Chemical Dependency Status:                Values/Beliefs:  Spiritual, Cultural Beliefs, Judaism Practices, Values that affect care:                 Additional Information:  Spoke to patient via telephone due to influenza . Says he lives in a house with his mother and is completely independent at baseline. He helps care for his mother. Says his brother can likely help while he is here. Denies home care services or DME use. Anticipate return home at discharge. He is unsure about transport because he doesn't want to expose anyone to influenza .     Tanvi Banerjee RN

## 2023-12-22 LAB
ALBUMIN UR-MCNC: NEGATIVE MG/DL
ANION GAP SERPL CALCULATED.3IONS-SCNC: 10 MMOL/L (ref 7–15)
ANION GAP SERPL CALCULATED.3IONS-SCNC: 11 MMOL/L (ref 7–15)
APPEARANCE UR: CLEAR
ATRIAL RATE - MUSE: 89 BPM
BILIRUB UR QL STRIP: NEGATIVE
BUN SERPL-MCNC: 18.2 MG/DL (ref 8–23)
BUN SERPL-MCNC: 18.8 MG/DL (ref 8–23)
CALCIUM SERPL-MCNC: 7.8 MG/DL (ref 8.8–10.2)
CALCIUM SERPL-MCNC: 7.9 MG/DL (ref 8.8–10.2)
CHLORIDE SERPL-SCNC: 100 MMOL/L (ref 98–107)
CHLORIDE SERPL-SCNC: 100 MMOL/L (ref 98–107)
COLOR UR AUTO: ABNORMAL
CREAT SERPL-MCNC: 0.81 MG/DL (ref 0.67–1.17)
CREAT SERPL-MCNC: 0.89 MG/DL (ref 0.67–1.17)
DEPRECATED HCO3 PLAS-SCNC: 22 MMOL/L (ref 22–29)
DEPRECATED HCO3 PLAS-SCNC: 22 MMOL/L (ref 22–29)
DIASTOLIC BLOOD PRESSURE - MUSE: 59 MMHG
EGFRCR SERPLBLD CKD-EPI 2021: >90 ML/MIN/1.73M2
EGFRCR SERPLBLD CKD-EPI 2021: >90 ML/MIN/1.73M2
ERYTHROCYTE [DISTWIDTH] IN BLOOD BY AUTOMATED COUNT: 12.5 % (ref 10–15)
GLUCOSE BLDC GLUCOMTR-MCNC: 317 MG/DL (ref 70–99)
GLUCOSE BLDC GLUCOMTR-MCNC: 360 MG/DL (ref 70–99)
GLUCOSE BLDC GLUCOMTR-MCNC: 363 MG/DL (ref 70–99)
GLUCOSE BLDC GLUCOMTR-MCNC: 415 MG/DL (ref 70–99)
GLUCOSE SERPL-MCNC: 297 MG/DL (ref 70–99)
GLUCOSE SERPL-MCNC: 414 MG/DL (ref 70–99)
GLUCOSE UR STRIP-MCNC: >1000 MG/DL
HCT VFR BLD AUTO: 40 % (ref 40–53)
HGB BLD-MCNC: 13.4 G/DL (ref 13.3–17.7)
HGB UR QL STRIP: ABNORMAL
INTERPRETATION ECG - MUSE: NORMAL
KETONES UR STRIP-MCNC: 20 MG/DL
LEUKOCYTE ESTERASE UR QL STRIP: NEGATIVE
MCH RBC QN AUTO: 30.7 PG (ref 26.5–33)
MCHC RBC AUTO-ENTMCNC: 33.5 G/DL (ref 31.5–36.5)
MCV RBC AUTO: 92 FL (ref 78–100)
NITRATE UR QL: NEGATIVE
P AXIS - MUSE: 80 DEGREES
PH UR STRIP: 6 [PH] (ref 5–7)
PLATELET # BLD AUTO: 134 10E3/UL (ref 150–450)
POTASSIUM SERPL-SCNC: 4.6 MMOL/L (ref 3.4–5.3)
POTASSIUM SERPL-SCNC: 4.8 MMOL/L (ref 3.4–5.3)
PR INTERVAL - MUSE: 160 MS
QRS DURATION - MUSE: 82 MS
QT - MUSE: 396 MS
QTC - MUSE: 481 MS
R AXIS - MUSE: 82 DEGREES
RBC # BLD AUTO: 4.36 10E6/UL (ref 4.4–5.9)
RBC URINE: 9 /HPF
SODIUM SERPL-SCNC: 132 MMOL/L (ref 135–145)
SODIUM SERPL-SCNC: 133 MMOL/L (ref 135–145)
SP GR UR STRIP: 1.02 (ref 1–1.03)
SYSTOLIC BLOOD PRESSURE - MUSE: 129 MMHG
T AXIS - MUSE: 76 DEGREES
UROBILINOGEN UR STRIP-MCNC: 2 MG/DL
VENTRICULAR RATE- MUSE: 89 BPM
WBC # BLD AUTO: 7.3 10E3/UL (ref 4–11)
WBC URINE: <1 /HPF

## 2023-12-22 PROCEDURE — 250N000011 HC RX IP 250 OP 636: Mod: JZ | Performed by: STUDENT IN AN ORGANIZED HEALTH CARE EDUCATION/TRAINING PROGRAM

## 2023-12-22 PROCEDURE — 99233 SBSQ HOSP IP/OBS HIGH 50: CPT | Performed by: STUDENT IN AN ORGANIZED HEALTH CARE EDUCATION/TRAINING PROGRAM

## 2023-12-22 PROCEDURE — 36415 COLL VENOUS BLD VENIPUNCTURE: CPT | Performed by: STUDENT IN AN ORGANIZED HEALTH CARE EDUCATION/TRAINING PROGRAM

## 2023-12-22 PROCEDURE — 250N000013 HC RX MED GY IP 250 OP 250 PS 637: Performed by: STUDENT IN AN ORGANIZED HEALTH CARE EDUCATION/TRAINING PROGRAM

## 2023-12-22 PROCEDURE — 258N000003 HC RX IP 258 OP 636: Performed by: INTERNAL MEDICINE

## 2023-12-22 PROCEDURE — 80048 BASIC METABOLIC PNL TOTAL CA: CPT | Performed by: STUDENT IN AN ORGANIZED HEALTH CARE EDUCATION/TRAINING PROGRAM

## 2023-12-22 PROCEDURE — 85014 HEMATOCRIT: CPT | Performed by: INTERNAL MEDICINE

## 2023-12-22 PROCEDURE — 80048 BASIC METABOLIC PNL TOTAL CA: CPT | Performed by: INTERNAL MEDICINE

## 2023-12-22 PROCEDURE — 36415 COLL VENOUS BLD VENIPUNCTURE: CPT | Performed by: INTERNAL MEDICINE

## 2023-12-22 PROCEDURE — 81001 URINALYSIS AUTO W/SCOPE: CPT | Performed by: EMERGENCY MEDICINE

## 2023-12-22 PROCEDURE — 120N000001 HC R&B MED SURG/OB

## 2023-12-22 RX ORDER — TRAZODONE HYDROCHLORIDE 50 MG/1
50 TABLET, FILM COATED ORAL
Status: DISCONTINUED | OUTPATIENT
Start: 2023-12-22 | End: 2023-12-23 | Stop reason: HOSPADM

## 2023-12-22 RX ORDER — HUMAN INSULIN 100 [IU]/ML
20-25 INJECTION, SUSPENSION SUBCUTANEOUS 2 TIMES DAILY WITH MEALS
COMMUNITY

## 2023-12-22 RX ORDER — OSELTAMIVIR PHOSPHATE 75 MG/1
75 CAPSULE ORAL 2 TIMES DAILY
Status: DISCONTINUED | OUTPATIENT
Start: 2023-12-22 | End: 2023-12-23 | Stop reason: HOSPADM

## 2023-12-22 RX ADMIN — OSELTAMIVIR PHOSPHATE 75 MG: 75 CAPSULE ORAL at 08:18

## 2023-12-22 RX ADMIN — SODIUM CHLORIDE: 9 INJECTION, SOLUTION INTRAVENOUS at 13:20

## 2023-12-22 RX ADMIN — OSELTAMIVIR PHOSPHATE 75 MG: 75 CAPSULE ORAL at 21:23

## 2023-12-22 RX ADMIN — ENOXAPARIN SODIUM 40 MG: 40 INJECTION SUBCUTANEOUS at 21:31

## 2023-12-22 RX ADMIN — ACETAMINOPHEN 650 MG: 325 TABLET ORAL at 02:40

## 2023-12-22 ASSESSMENT — ACTIVITIES OF DAILY LIVING (ADL)
ADLS_ACUITY_SCORE: 43
ADLS_ACUITY_SCORE: 39
ADLS_ACUITY_SCORE: 41
ADLS_ACUITY_SCORE: 43
ADLS_ACUITY_SCORE: 41
ADLS_ACUITY_SCORE: 41
ADLS_ACUITY_SCORE: 39
ADLS_ACUITY_SCORE: 43
ADLS_ACUITY_SCORE: 43

## 2023-12-22 NOTE — PLAN OF CARE
[facility] - ICU    RN Progress Note:        Key Events - This Shift:   Transitioned off insulin drip after lantus given. Patient remains febrile and complains of fatigue and body aches. PRN tylenol given.             Barriers to Discharge / Downgrade:     No barriers to downgrade.          Dahiana Morse RN

## 2023-12-22 NOTE — PLAN OF CARE
[facility] - ICU    RN Progress Note:            Pertinent Assessments:      Please refer to flowsheet rows for full assessment     Pt spiked a fever 102.1 Tylenol given, provider notified. Pt complained of not feeling good, but was not nauseated. Temp recheck- 100, 99.2. Pt reported feeling much better           Key Events - This Shift:       Pt had a fever 102.1                  Barriers to Discharge / Downgrade:     Okay to discharge- Awaiting bed.

## 2023-12-22 NOTE — PLAN OF CARE
Goal Outcome Evaluation:    Pt arrived on unit from ICU around 0625. Pt A/O x4. Denies pain. Crackles present with insp/exp wheezes, currently on 3L O2. Placed on tele, NSR. Neuro checks intact. Temp 98.2, was given tylenol x2 upstairs. NS infusing 75 mL/hr. Carb count diet. SBA.

## 2023-12-22 NOTE — PROGRESS NOTES
[facility]    Medicine Progress Note - Hospitalist Service    Date of Admission:  12/20/2023    Assessment & Plan   Shaq Ny is a 63M presents with generalised weakness, nausea; pmhx inlcudes DM1 with neuropathy/retinopathy, HTN/HLD, tobacco dependence, R cervical radiculopathy with arm weakness, nephrectomy; admitted for DKA, influenza A infection.     Acute hypoxic respiratory failure 2/2 Influenza A  Acute Bronchitis  -Chest xray 12/20: negative for acute findings  -CBC trend  -olsetamivir 75mg PO BID x 5 days  -Antibiotics were discontinued as no evidence of pneumonia on imaging  -Wean O2 as tolerated  -Home oxygen assessment     DKA- resolved  -Elevated Ketones withOUT overt acidosis. Anticipated progression of DKA given acute illness.  -Presents in DKA. Anticipated trigger etiolog(ies) influenza, insulin vacation in setting of viral illness/DM1.  -A1c 9.0, previously 9.4 (1/22)  -Was on insulin drip, transition to subcu Lantus  -In Lantus from 7u to 12u today  -Patient prefers to continue on home regimen 70/30 Novolin, will consider change to Lantus with his Endocrinologist  -Will start Novolin 70/30 20u bid tomorrow, Medium Sliding scale  -DM educator  -mod consistent carb diet  -hypoglycemic protocol in place     Mild hypocalcemia  Hypoalbuminemia  Corrected calcium 8.7 12/21  Pending Vit D, PTH     Pseudohyponatremia, Na 138  Hypochloremia - resolved  IV fluids discontinued     Tobacco dependence  Current 1ppd cigarette usage. Declines NRT on initial discussion. Discussed for 3 minutes during admission evaluation.  -discuss chantix prior to discharge  -PCP follow up          Diet: Moderate Consistent Carb (60 g CHO per Meal) Diet    DVT Prophylaxis: Enoxaparin (Lovenox) SQ  Francisco Catheter: Not present  Lines: None     Cardiac Monitoring: None  Code Status: Full Code      Clinically Significant Risk Factors         # Hyponatremia: Lowest Na = 128 mmol/L in last 2 days, will  monitor as appropriate  # Hypocalcemia: Lowest Ca = 7.7 mg/dL in last 2 days, will monitor and replace as appropriate   # Hypomagnesemia: Lowest Mg = 1.5 mg/dL in last 2 days, will replace as needed   # Hypoalbuminemia: Lowest albumin = 3.2 g/dL at 12/21/2023  5:40 AM, will monitor as appropriate   # Thrombocytopenia: Lowest platelets = 128 in last 2 days, will monitor for bleeding         # DMII: A1C = 9.0 % (Ref range: <5.7 %) within past 6 months              Disposition Plan     Expected Discharge Date: 12/23/2023      Destination: home              Vibha Schwartz MD  Hospitalist Service  [facility]  Securely message with [employer] (more info)  Text page via [employer] Paging/Directory   ______________________________________________________________________    Interval History   And is new to me today, examined at the bedside.  States he feels much better than when he came in, spiked fever yesterday evening.  Still needing oxygen supplementation, will try to taper oxygen versus home oxygen evaluation.  Patient started on home insulin 70/30 Novolin, refusing to change to Lantus.  States he will consider a change when he follows up with his endocrinologist outpatient.  Started trazodone as needed for sleep    Physical Exam   Vital Signs: Temp: 98.2  F (36.8  C) Temp src: Oral BP: 116/60 Pulse: 69   Resp: 18 SpO2: 94 % O2 Device: Nasal cannula Oxygen Delivery: 3 LPM  Weight: 175 lbs 0 oz    General Appearance:  Awake, alert, in no acute distress  Respiratory: CTAB, no wheeze  Cardiovascular: RRR, no murmur noted  GI: soft, nontender, non distended, normal bowel sounds  Skin: no jaundice, no rash    Medical Decision Making       45 MINUTES SPENT BY ME on the date of service doing chart review, history, exam, documentation & further activities per the note.      Data     I have personally reviewed the following data over the past 24 hrs:    7.3  \   13.4   / 134 (L)     133 (L) 100 18.8 /  414 (H)    4.8 22 0.81 \       Imaging results reviewed over the past 24 hrs:   No results found for this or any previous visit (from the past 24 hour(s)).

## 2023-12-22 NOTE — PLAN OF CARE
Goal Outcome Evaluation:       Shaq denies SOB, he reports productive cough. Good appetite. He walked to the bathroom with SBA.

## 2023-12-22 NOTE — PHARMACY-ADMISSION MEDICATION HISTORY
Updated med list     Pharmacist Admission Medication History    Admission medication history is complete. The information provided in this note is only as accurate as the sources available at the time of the update.    Information Source(s): Patient, Patient's pharmacy, and [facility]SureScripts via in-person    Pertinent Information: The patient has never filled Novolin 70/30 insulin from the [facility].  It is unclear if he is buying it over the counter.    Changes made to PTA medication list:  Added: Novolin 70/30  Deleted: None  Changed: None    Medication Affordability:  Not including over the counter (OTC) medications, was there a time in the past 3 months when you did not take your medications as prescribed because of cost?: No    Allergies reviewed with patient and updates made in EHR: unable to assess    Medication History Completed By: Deann Zaragoza [facility] 12/22/2023 3:16 PM    PTA Med List   Medication Sig Last Dose    insulin NPH-Regular (NOVOLIN 70/30 FLEXPEN) susp Inject 20-25 Units Subcutaneous 2 times daily (with meals) Unknown    insulin regular 100 UNIT/ML vial Inject 1-5 Units Subcutaneous See Admin Instructions Take 30 minutes before meals. Sliding scale of 1 unit per 50 mg/dL Glucose over 150 mg/dL. 151-200: take 1 unit. 201-250: take 2 units. 251-300: take 3 units. 301-350: take 4 units. Over 351, take 5 units. Total Daily dose 25 units.

## 2023-12-22 NOTE — PROGRESS NOTES
Care Management Follow Up    Length of Stay (days): 2    Expected Discharge Date: 12/23/2023     Concerns to be Addressed:       Patient plan of care discussed at interdisciplinary rounds: Yes    Anticipated Discharge Disposition: Home     Anticipated Discharge Services: home O2    Anticipated Discharge DME: oxygen     Patient/family educated on Medicare website which has current facility and service quality ratings: N/A   Education Provided on the Discharge Plan: yes   Patient/Family in Agreement with the Plan: yes     Referrals Placed by CM/SW: N/A   Private pay costs discussed: Not applicable    Additional Information:  11:58 AM  GABBY received a phone call from Pt's [facility]'s Care Coordinator Naz who reported that she will be out-of-office until 1/8. Naz reported that CM can contact the general  CM Line and request for Pt's covering CC to provide an update for when Pt discharges. GABBY provided Naz an update on Pt's status and reported that CM will update her team when Pt discharges.    [facility] Care Management Line: [phone]    ANNA Wynn

## 2023-12-23 VITALS
SYSTOLIC BLOOD PRESSURE: 127 MMHG | OXYGEN SATURATION: 95 % | RESPIRATION RATE: 18 BRPM | TEMPERATURE: 99.6 F | DIASTOLIC BLOOD PRESSURE: 60 MMHG | WEIGHT: 175 LBS | BODY MASS INDEX: 24.41 KG/M2 | HEART RATE: 57 BPM

## 2023-12-23 LAB
ANION GAP SERPL CALCULATED.3IONS-SCNC: 11 MMOL/L (ref 7–15)
BUN SERPL-MCNC: 15.4 MG/DL (ref 8–23)
CALCIUM SERPL-MCNC: 8.5 MG/DL (ref 8.8–10.2)
CHLORIDE SERPL-SCNC: 98 MMOL/L (ref 98–107)
CREAT SERPL-MCNC: 0.81 MG/DL (ref 0.67–1.17)
CREAT SERPL-MCNC: 1 MG/DL (ref 0.67–1.17)
DEPRECATED HCO3 PLAS-SCNC: 24 MMOL/L (ref 22–29)
EGFRCR SERPLBLD CKD-EPI 2021: 84 ML/MIN/1.73M2
EGFRCR SERPLBLD CKD-EPI 2021: >90 ML/MIN/1.73M2
GLUCOSE BLDC GLUCOMTR-MCNC: 286 MG/DL (ref 70–99)
GLUCOSE BLDC GLUCOMTR-MCNC: 340 MG/DL (ref 70–99)
GLUCOSE BLDC GLUCOMTR-MCNC: 341 MG/DL (ref 70–99)
GLUCOSE SERPL-MCNC: 357 MG/DL (ref 70–99)
PLATELET # BLD AUTO: 135 10E3/UL (ref 150–450)
POTASSIUM SERPL-SCNC: 4.2 MMOL/L (ref 3.4–5.3)
SODIUM SERPL-SCNC: 133 MMOL/L (ref 135–145)

## 2023-12-23 PROCEDURE — 250N000013 HC RX MED GY IP 250 OP 250 PS 637: Performed by: STUDENT IN AN ORGANIZED HEALTH CARE EDUCATION/TRAINING PROGRAM

## 2023-12-23 PROCEDURE — 36415 COLL VENOUS BLD VENIPUNCTURE: CPT | Performed by: STUDENT IN AN ORGANIZED HEALTH CARE EDUCATION/TRAINING PROGRAM

## 2023-12-23 PROCEDURE — 99239 HOSP IP/OBS DSCHRG MGMT >30: CPT | Performed by: STUDENT IN AN ORGANIZED HEALTH CARE EDUCATION/TRAINING PROGRAM

## 2023-12-23 PROCEDURE — 250N000012 HC RX MED GY IP 250 OP 636 PS 637: Performed by: STUDENT IN AN ORGANIZED HEALTH CARE EDUCATION/TRAINING PROGRAM

## 2023-12-23 PROCEDURE — 80048 BASIC METABOLIC PNL TOTAL CA: CPT | Performed by: STUDENT IN AN ORGANIZED HEALTH CARE EDUCATION/TRAINING PROGRAM

## 2023-12-23 PROCEDURE — 85049 AUTOMATED PLATELET COUNT: CPT | Performed by: STUDENT IN AN ORGANIZED HEALTH CARE EDUCATION/TRAINING PROGRAM

## 2023-12-23 RX ORDER — OSELTAMIVIR PHOSPHATE 75 MG/1
75 CAPSULE ORAL 2 TIMES DAILY
Qty: 4 CAPSULE | Refills: 0 | Status: SHIPPED | OUTPATIENT
Start: 2023-12-23 | End: 2023-12-25

## 2023-12-23 RX ADMIN — HUMAN INSULIN 20 UNITS: 100 INJECTION, SUSPENSION SUBCUTANEOUS at 09:01

## 2023-12-23 RX ADMIN — OSELTAMIVIR PHOSPHATE 75 MG: 75 CAPSULE ORAL at 08:10

## 2023-12-23 ASSESSMENT — ACTIVITIES OF DAILY LIVING (ADL)
ADLS_ACUITY_SCORE: 39
ADLS_ACUITY_SCORE: 22
ADLS_ACUITY_SCORE: 39
ADLS_ACUITY_SCORE: 22
ADLS_ACUITY_SCORE: 39
ADLS_ACUITY_SCORE: 22
ADLS_ACUITY_SCORE: 22

## 2023-12-23 NOTE — PROVIDER NOTIFICATION
Pt weened from 3L O2 via NC to 1L this evening.. O2 drops to 87 when on RA.    12/22/23 2131   Home Oxygen Assessment (RN/RT ONLY)   Does patient have oxygen at home? No   1. SpO2 on room air at rest while awake 88   2. SpO2 while at rest on oxygen 95       Oxygen LPM at rest 1   3. SpO2 on room air during Activity/with exercise 97   4. SpO2 with oxygen during activity/with exercise 95       Oxygen LPM during activity/with exercise 1   Does patient qualify for Home O2? (S)  Yes     Cally Francis RN.

## 2023-12-23 NOTE — DISCHARGE SUMMARY
[facility]    Hospitalist Discharge Summary       Date of Admission:  12/20/2023  Date of Discharge:  12/23/2023  1:15 PM  Discharging Provider: Vibha Schwartz MD      Discharge Diagnoses   Acute hypoxic respiratory failure 2/2 Influenza A  Acute Bronchitis  DKA- resolved       Follow-ups Needed After Discharge   Follow-up Appointments     Follow-up and recommended labs and tests       Follow up with primary care provider, [facility],   within 2 weeks for hospital follow- up.  The following labs/tests are   recommended: CMP, Vit D.   Patient states he will consider smoking cessation outpatient    [facility] up with [facility] for ongoing Diabetes management. Patient is   considering switching to Lantus from 70/30 Novolin outpatient with   Endocrinology            Unresulted Labs Ordered in the Past 30 Days of this Admission       Date and Time Order Name Status Description    12/20/2023 10:41 PM 25 Hydroxyvitamin D2 and D3 In process     12/20/2023  7:57 PM Blood Culture Peripheral Blood Preliminary     12/20/2023  7:57 PM Blood Culture Peripheral Blood Preliminary         These results will be followed up by [facility]    Hospital Course   Shaq Ny is a 63M presents with generalised weakness, nausea; pmhx inlcudes DM1 with neuropathy/retinopathy, HTN/HLD, tobacco dependence, R cervical radiculopathy with arm weakness, nephrectomy; admitted for DKA, influenza A infection.     Acute hypoxic respiratory failure 2/2 Influenza A  Sepsis on presentation, likely 2/2 Influenza A  Acute Bronchitis  -complete oseltamivir 75mg PO BID x 5 days  -Antibiotics were discontinued as no evidence of pneumonia  -Weaned O2 and patient is saturating 94-95% with exertion     DKA- resolved  -Elevated Ketones withOUT overt acidosis. Anticipated progression of DKA given acute illness.  A1c 9.0  -Was on insulin drip,  transition to subcutaneous Lantus  -Patient preferred to continue with 70/30 Novolin, PT will consider changing to Lantus with his endocrinologist outpatient  -Blood sugar elevated, no evidence of DKA, discharged on home regimen of Novolin and sliding scale  -Recommended endocrinology follow-up within 1 week and switch to Lantus if patient agreeable     Mild hypocalcemia  Hypoalbuminemia  Corrected calcium 8.7 12/21  Pending Vit D, follow up with PCP     Tobacco dependence  Current 1ppd cigarette usage. Declines NRT  States will consider outpatient with PCP    Consultations This Hospital Stay   CARE MANAGEMENT / SOCIAL WORK IP CONSULT    Code Status   Full Code    Time Spent on this Encounter   I,Vibha Schwartz, personally saw the patient today and spent approximately 35 minutes discharging this patient.       Vibha Schwartz MD  [facility]  ______________________________________________________________________    Physical Exam   Vital Signs: Temp: 99.6  F (37.6  C) Temp src: Oral BP: 127/60 Pulse: 57   Resp: 18 SpO2: 95 % O2 Device: None (Room air) Oxygen Delivery: 1 LPM  Weight: 175 lbs 0 oz    Physical Exam      General Appearance:  Awake, alert, in no acute distress  Respiratory: CTAB, no wheeze  Cardiovascular: RRR, no murmur noted  GI: soft, nontender, non distended, normal bowel sounds  Skin: no jaundice, no rash    Primary Care Physician   [facility]    Discharge Disposition   Discharged to home  Condition at discharge: Good    Significant Results and Procedures   Most Recent 3 CBC's:  Recent Labs   Lab Test 12/23/23  0743 12/22/23  0411 12/21/23  0540 12/20/23  1935   WBC  --  7.3 10.5 10.2   HGB  --  13.4 13.9 15.1   MCV  --  92 93 89   * 134* 128* 143*     Most Recent 3 BMP's:  Recent Labs   Lab Test 12/23/23  1146 12/23/23  0845 12/23/23  0802 12/22/23  1648 12/22/23  1205 12/22/23  0815 12/22/23  0411   NA  --  133*  --   --  133*  --  132*    POTASSIUM  --  4.2  --   --  4.8  --  4.6   CHLORIDE  --  98  --   --  100  --  100   CO2  --  24  --   --  22  --  22   BUN  --  15.4  --   --  18.8  --  18.2   CR  --  0.81  --   --  0.81  --  0.89   ANIONGAP  --  11  --   --  11  --  10   DIONISIO  --  8.5*  --   --  7.8*  --  7.9*   * 357* 340*   < > 414*   < > 297*    < > = values in this interval not displayed.     Most Recent 2 LFT's:  Recent Labs   Lab Test 12/20/23  1935 08/11/22  0716   AST 37 23   ALT 15 23   ALKPHOS 78 85   BILITOTAL 0.5 0.7     Most Recent 3 INR's:No lab results found.  Most Recent 3 Troponin's:No lab results found.  Most Recent 3 BNP's:No lab results found.  Most Recent D-dimer:No lab results found.  Most Recent Cholesterol Panel:  Recent Labs   Lab Test 01/30/22  1420   CHOL 158      HDL 33*   TRIG 122       Results for orders placed or performed during the hospital encounter of 12/20/23   Chest XR,  PA & LAT    Narrative    EXAM: XR CHEST 2 VIEWS  LOCATION: [facility]  DATE: 12/20/2023    INDICATION: fever, cough, hypoxic  COMPARISON: 01/30/2022 chest x-ray and CT chest 01/30/2022.      Impression    IMPRESSION: There some stable fibrotic change in the left lung base and right midlung fairly similar to prior imaging. No convincing evidence for acute new findings.       Discharge Orders      Reason for your hospital stay    Acute hypoxic respiratory failure 2/2 Influenza A  Acute Bronchitis  DKA - resolved     Follow-up and recommended labs and tests     Follow up with primary care provider, [facility], within 2 weeks for hospital follow- up.  The following labs/tests are recommended: CMP, Vit D.   Patient states he will consider smoking cessation outpatient    Follow up with primary Endocrinologist, Edd Garnett, at [facility] within 7 days for ongoing Diabetes management. Patient is considering switching to Lantus from 70/30 Novolin outpatient with Endocrinology      Activity    Your activity upon discharge: activity as tolerated     Diet    Follow this diet upon discharge:      Moderate Consistent Carb (60 g CHO per Meal) Diet     Discharge Medications   Current Discharge Medication List        START taking these medications    Details   oseltamivir (TAMIFLU) 75 MG capsule Take 1 capsule (75 mg) by mouth 2 times daily for 2 days  Qty: 4 capsule, Refills: 0    Associated Diagnoses: Influenza A           CONTINUE these medications which have NOT CHANGED    Details   insulin NPH-Regular (NOVOLIN 70/30 FLEXPEN) susp Inject 20-25 Units Subcutaneous 2 times daily (with meals)      insulin regular 100 UNIT/ML vial Inject 1-5 Units Subcutaneous See Admin Instructions Take 30 minutes before meals. Sliding scale of 1 unit per 50 mg/dL Glucose over 150 mg/dL. 151-200: take 1 unit. 201-250: take 2 units. 251-300: take 3 units. 301-350: take 4 units. Over 351, take 5 units. Total Daily dose 25 units.           Allergies   Allergies   Allergen Reactions    Oxycodone Other (See Comments) and Nausea     Mood disturbances

## 2023-12-23 NOTE — PLAN OF CARE
Problem: Adult Inpatient Plan of Care  Goal: Optimal Comfort and Wellbeing  Outcome: Progressing   care explained   reassurance provided   thoughts/feelings acknowledged   questions encouraged   emotional support provided   -Pt concerned about his mother's safety, states his mother is in 90s and Pt is the primarily care giver. Pt encouraged to follow up and get wellness check on her, otherwise slept well overnight.   Problem: Comorbidity Management  Goal: Blood Glucose Levels Within Targeted Range  Outcome: Progressing   -pt BG at 0200 -286, on sliding scale insulin at HS and scheduled NPH  Problem: Infection  Goal: Absence of Infection Signs and Symptoms  Outcome: Progressing  -Pt receiving scheduled TAMIFLU, VSS no fevers.    Goal Outcome Evaluation:       Pt slept well overnight. A/Ox4, VSS on 1L NC. No acute events reported overnight.

## 2023-12-23 NOTE — PLAN OF CARE
Goal Outcome Evaluation:       Shaq feels ready to go home. Blood glucose continues to be elevated. No SOB when ambulating.

## 2023-12-23 NOTE — PLAN OF CARE
Problem: Adult Inpatient Plan of Care  Goal: Optimal Comfort and Wellbeing  Outcome: Progressing     Problem: Infection  Goal: Absence of Infection Signs and Symptoms  Outcome: Progressing   Goal Outcome Evaluation:    Pt is alert and oriented x4, on 1 L of 02 visa nasal cannula. Pt denies any pain or discomfort. Pt denies any shortness of breath, lung sounds clear, diminished. Has intermittent productive cough.    Bgs 360 and 363 this shift.     Eating, drinking and voiding well. Pt is standby assist. Able to make his needs known.     Cally Francis RN.

## 2023-12-25 LAB — BACTERIA BLD CULT: NO GROWTH

## 2023-12-26 LAB — BACTERIA BLD CULT: NO GROWTH

## 2023-12-30 LAB
DEPRECATED CALCIDIOL+CALCIFEROL SERPL-MC: <24 UG/L (ref 20–75)
VITAMIN D2 SERPL-MCNC: <5 UG/L
VITAMIN D3 SERPL-MCNC: 19 UG/L

## 2024-02-08 ENCOUNTER — HOSPITAL ENCOUNTER (EMERGENCY)
Facility: HOSPITAL | Age: 64
Discharge: HOME | End: 2024-02-08
Attending: EMERGENCY MEDICINE | Admitting: EMERGENCY MEDICINE

## 2024-02-08 VITALS
HEART RATE: 75 BPM | RESPIRATION RATE: 20 BRPM | HEIGHT: 71 IN | OXYGEN SATURATION: 95 % | BODY MASS INDEX: 25.2 KG/M2 | TEMPERATURE: 97.5 F | DIASTOLIC BLOOD PRESSURE: 72 MMHG | WEIGHT: 180 LBS | SYSTOLIC BLOOD PRESSURE: 122 MMHG

## 2024-02-08 DIAGNOSIS — E16.2 HYPOGLYCEMIA: ICD-10-CM

## 2024-02-08 LAB
GLUCOSE BLDC GLUCOMTR-MCNC: 141 MG/DL (ref 70–99)
GLUCOSE BLDC GLUCOMTR-MCNC: 92 MG/DL (ref 70–99)

## 2024-02-08 PROCEDURE — 99283 EMERGENCY DEPT VISIT LOW MDM: CPT

## 2024-02-08 PROCEDURE — 82962 GLUCOSE BLOOD TEST: CPT

## 2024-02-09 NOTE — ED NOTES
Bed: JNED-26  Expected date: 2/8/24  Expected time: 7:12 PM  Means of arrival: Ambulance  Comments:  Pedro 62 yo M hypoglycemic

## 2024-02-09 NOTE — ED PROVIDER NOTES
EMERGENCY DEPARTMENT ENCOUNTER      NAME: Shaq Ny  AGE: 63 year old male  YOB: 1960  MRN: [id number]  EVALUATION DATE & TIME: 2/8/2024  7:18 PM    PCP: [facility]    ED PROVIDER: Satinder Vargas M.D.      Chief Complaint   Patient presents with    Hypoglycemia         FINAL IMPRESSION:  1. Hypoglycemia          ED COURSE & MEDICAL DECISION MAKING:    Pertinent Labs & Imaging studies reviewed. (See chart for details)  63 year old male presents to the Emergency Department for evaluation of hypoglycemia. Patient appears non toxic with stable vitals signs, lungs are clear and abdomen is benign.  Patient has a GCS of 15 with no focal neurodeficits, he is awake, alert and oriented x 3.  Patient does have some scattered superficial facial abrasions but he otherwise denies any acute pain whatsoever.  Per report from EMS and nursing staff patient had initial blood sugars of 55 and 50, was given IM glucagon and route and at bedside now blood sugars around 90.  Patient states that he was working hard throughout the day today and not eating very much, denies any recent changes to his diabetic medications.  Per review of the medical record, did review discharge summary from 12/23/2023 from [facility] where patient was discharged following acute hypoxic respiratory failure secondary to influenza A and DKA.  Here he denies any fevers, cough, shortness of breath, chest pain, change in bowel or bladder habits, blood in his urine or stools.  Suspect his hypoglycemia secondary to poor intake throughout the day and moderate exertion throughout the day, do not suspect toxic etiology, infectious process.  With the facial abrasions did offer CT imaging here for CT imaging of the head and facial bones though clinically nothing to suggest fracture or dislocation, denies any falls or trauma, headache, again here he has a benign neurologic exam, low suspicion for  depressed skull fracture intracranial bleed.  No neck pain nothing to suggest cervical fracture or dislocation.  Patient states he has no insurance and defers imaging studies, had a long conversation with the patient and I feel he has capacity and understands risk and benefits and he continues to defer CT imaging which ultimately I felt was reasonable given benign exam and well appearance.  He is amenable to screening labs and will provide p.o. food here to ensure patient has no repeated episodes of hypoglycemia.    Reassessment: Serial glucose checks here are reassuring, patient was able to easily tolerate p.o. food.  Repeat exam was benign.  I again recommended the some screening labs, again offered CT imaging but the patient deferred secondary to what he states is no insurance.  That said he is alert and oriented, appears to have capacity and understands risk and benefits, therefore given that he tolerated p.o. food, glucose checks here reassuring feel he is safe for discharge and close follow-up.  Recommend that he continue his diabetic medications as prescribed and be sure that he continues to eat throughout the day.  Will recommend close follow-up with primary care in the next 7 days.  Discussed all these findings with the patient felt reassured and comfortable discharge.  Return precautions provided.    7:26 PM I met the patient and performed my initial interview and exam. Patient stated that he does not have insurance and would not like to do any imaging or unnecessary lab work.   7:52 PM per nursing report, patient is now refusing laboratory studies.  He has had apple juice and turkey sandwich.  We are rechecking point-of-care glucose.  8:53 PM repeat exam is benign.  Discussed findings and discharge close follow-up.        Medical Decision Making  Obtained supplemental history:Supplemental history obtained?: EMS  Reviewed external records: External records reviewed?: No  Care impacted by chronic  illness:Diabetes  Care significantly affected by social determinants of health:N/A  Did you consider but not order tests?: Work up considered but not performed and documented in chart, if applicable  Did you interpret images independently?: Independent interpretation of ECG and images noted in documentation, when applicable.  Consultation discussion with other provider:Did you involve another provider (consultant, MH, pharmacy, etc.)?: No  Discharge. No recommendations on prescription strength medication(s). See documentation for any additional details.      At the conclusion of the encounter I discussed the results of all of the tests and the disposition. The questions were answered and return precautions provided. The patient or family acknowledged understanding and was agreeable with the care plan.         MEDICATIONS GIVEN IN THE EMERGENCY:  Medications - No data to display    NEW PRESCRIPTIONS STARTED AT TODAY'S ER VISIT  Discharge Medication List as of 2/8/2024  8:54 PM               =================================================================    HPI    Patient information was obtained from: patient, EMS    Use of Intrepreter: N/A       Shaq Ny is a 63 year old male who presents via EMS for evaluation of hypoglycemia.    Per the patient, He had just gotten home to his home and was standing in his driveway when he had a loss of consciousness, then being unable to recall much that happened until his arrival at the ER. He states that he was told that he had low blood sugar by EMS, and currently feels well with no pain.    Per EMS, the patient was found by his neighbor who is a paramedic. The neighbor knew he was diabetic and called EMS, then attempting to give the patient food, though the patient fought against this. The patient was combative at the time, and became more alert at the end of EMS transport after medication administration.    The patient takes his diabetes mellitus medication. He has  no medication allergies, and denies fever, vomiting, diarrhea, chest pain, neck pain.    The patient uses tobacco, but does not drink alcohol or use drugs.      REVIEW OF SYSTEMS   Constitutional:  Denies fever, chills. Endorses low blood sugar.  Respiratory:  Denies productive cough or increased work of breathing  Cardiovascular:  Denies chest pain, palpitations  GI:  Denies abdominal pain, nausea, vomiting, or change in bowel or bladder habits   Musculoskeletal:  Denies any new muscle/joint swelling  Skin:  Denies rash   Neurologic:  Denies focal weakness.   All systems negative except as marked.     PAST MEDICAL HISTORY:  Past Medical History:   Diagnosis Date    Diabetes mellitus (H)     insulin dependent, onset age 30s    History of nephrectomy     Spinal stenosis        PAST SURGICAL HISTORY:  Past Surgical History:   Procedure Laterality Date    BLADDER SURGERY      4th grade    FUSION CERVICAL ANTERIOR TWO LEVELS N/A 8/10/2022    Procedure: Anterior cervical discectomy and fusion at cervical 5 to cervical 6 and cervical 6 to cervical 7 for spinal stenosis and left cervical 7 radiculopathy;  Surgeon: Eric Quiroz MD;  Location: [facility] OR    KIDNEY SURGERY Left     kidney removed in 4th grade    KIDNEY SURGERY Right     partial removal in 4th grade, no hx of transplants         CURRENT MEDICATIONS:    Prior to Admission medications    Medication Sig Start Date End Date Taking? Authorizing Provider   insulin NPH-Regular (NOVOLIN 70/30 FLEXPEN) susp Inject 20-25 Units Subcutaneous 2 times daily (with meals)    Unknown, Entered By History   insulin regular 100 UNIT/ML vial Inject 1-5 Units Subcutaneous See Admin Instructions Take 30 minutes before meals. Sliding scale of 1 unit per 50 mg/dL Glucose over 150 mg/dL. 151-200: take 1 unit. 201-250: take 2 units. 251-300: take 3 units. 301-350: take 4 units. Over 351, take 5 units. Total Daily dose 25 units. 4/26/23   Reported, Patient     "    ALLERGIES:  Allergies   Allergen Reactions    Oxycodone Other (See Comments) and Nausea     Mood disturbances        FAMILY HISTORY:  Family History   Problem Relation Age of Onset    Coronary Artery Disease Mother     Coronary Artery Disease Father     Coronary Artery Disease Brother        SOCIAL HISTORY:   Social History     Socioeconomic History    Marital status:    Tobacco Use    Smoking status: Some Days     Packs/day: 1.00     Years: 50.00     Additional pack years: 0.00     Total pack years: 50.00     Types: Cigarettes    Smokeless tobacco: Never   Substance and Sexual Activity    Alcohol use: Not Currently    Drug use: Never       VITALS:  Patient Vitals for the past 24 hrs:   BP Temp Temp src Pulse Resp SpO2 Height Weight   02/08/24 2034 122/72 -- -- 75 -- 95 % -- --   02/08/24 2004 139/81 -- -- 75 -- 95 % -- --   02/08/24 1929 138/77 -- -- 67 -- 97 % -- --   02/08/24 1921 (!) 140/76 97.5  F (36.4  C) Oral 66 20 97 % 1.803 m (5' 11\") 81.6 kg (180 lb)        PHYSICAL EXAM    Constitutional:  Awake, alert, in no apparent distress  HENT:  Normocephalic, with no scalp hematomas or skull depressions, no signs of basilar skull injury. Bilateral external ears normal. Oropharynx moist. Nose normal. Neck- Normal range of motion with no guarding, No midline cervical tenderness, Supple, No stridor.  Scattered, superficial facial abrasions.  Eyes:  PERRL, EOMI with no signs of entrapment, Conjunctiva normal, No discharge.   Respiratory:  Normal breath sounds, No respiratory distress, No wheezing.    Cardiovascular:  Normal heart rate, Normal rhythm, No appreciable rubs or gallops.   GI:  Soft, No tenderness, No distension, No palpable masses  Musculoskeletal:  Intact distal pulses, No edema. Good range of motion in all major joints. No tenderness to palpation or major deformities noted.  Back-nontender along midline cervical, thoracic and lumbar spine no step-offs or signs of trauma.  Integument:  Warm, " Dry, No erythema, No rash.   Neurologic:  Alert & oriented x 3, Normal motor function, Normal sensory function, No focal deficits noted.   Psychiatric:  Affect normal, Judgment normal, Mood normal.     LAB:  All pertinent labs reviewed and interpreted.  Results for orders placed or performed during the hospital encounter of 02/08/24   Glucose by meter   Result Value Ref Range    GLUCOSE BY METER POCT 92 70 - 99 mg/dL   Glucose by meter   Result Value Ref Range    GLUCOSE BY METER POCT 141 (H) 70 - 99 mg/dL       RADIOLOGY:  No orders to display          EKG:      I have independently reviewed and interpreted the EKG(s) documented above.    PROCEDURES:        Software [employer] System Documentation:       I, Mir Dolan, am serving as a scribe to document services personally performed by Satinder Vargas MD, based on my observation and the provider's statements to me. I, Satinder Vargas MD attest that Mir Dolan is acting in a scribe capacity, has observed my performance of the services and has documented them in accordance with my direction.    Satinder Vargas M.D.  Emergency Medicine  [facility] EMERGENCY DEPARTMENT  [facility] [facility] [address]  [phone]  Dept: [phone]     Satinder Vargas MD  02/08/24 [id number]

## 2024-02-09 NOTE — ED NOTES
Pt is refusing an IV or lab draws. Pt states he is not insured and can not afford this. Warned Pt of risks of not having labs drawn. Pt continues to decline labs. Dr. Vargas is aware and says he will speak with the Pt.

## 2024-02-09 NOTE — ED TRIAGE NOTES
Pt know diabetic. A neighbor saw Pt laying on the ground, tried to give Pt some food but Pt was combative. EMS called, initial BG was 55 and went down to 50. Pt pulled out IV that EMS placed. Medics gave Pt some IM glucagon and transported Pt. Pt became more alert and cooperative at the end of transport.      Triage Assessment (Adult)       Row Name 02/08/24 9064          Triage Assessment    Airway WDL WDL        Respiratory WDL    Respiratory WDL WDL        Skin Circulation/Temperature WDL    Skin Circulation/Temperature WDL WDL        Cardiac WDL    Cardiac WDL WDL        Peripheral/Neurovascular WDL    Peripheral Neurovascular WDL WDL        Cognitive/Neuro/Behavioral WDL    Cognitive/Neuro/Behavioral WDL WDL

## (undated) DEVICE — SYR EAR BULB 3OZ 0035830

## (undated) DEVICE — GLOVE PROTEXIS W/NEU-THERA 7.5  2D73TE75

## (undated) DEVICE — MANIFOLD NEPTUNE 4 PORT 700-20

## (undated) DEVICE — GOWN XXL 9575

## (undated) DEVICE — DRAPE STERI TOWEL LG 1010

## (undated) DEVICE — SOL NACL 0.9% INJ 250ML BAG 2B1322Q

## (undated) DEVICE — STRAP POSITIONING VELCRO 13' CERVICAL HARNESS 920877

## (undated) DEVICE — PACK SPINE SM CUSTOM SNE15SSFSK

## (undated) DEVICE — GLOVE PROTEXIS BLUE W/NEU-THERA 8.5  2D73EB85

## (undated) DEVICE — DRAPE SHEET REV FOLD 3/4 9349

## (undated) DEVICE — SPONGE SURGIFOAM 50

## (undated) DEVICE — SU VICRYL 3-0 RB-1 18" J713D

## (undated) DEVICE — TUBING SET IRR MALIS BIPOLAR CORD INTEGRATE 6790-100-003

## (undated) DEVICE — LINEN TOWEL PACK X5 5464

## (undated) DEVICE — BONE WAX 2.5GM W31G

## (undated) DEVICE — DRILL BIT MEDT ELITE 11MM SS 7080510

## (undated) DEVICE — CATH TRAY FOLEY COUDE SURESTEP 16FR W/URNE MTR STLK A304716A

## (undated) DEVICE — ESU GROUND PAD UNIVERSAL W/O CORD

## (undated) DEVICE — TOOL DISSECT MIDAS MR8 14CM MATCH HEAD 3MM MR8-14MH30

## (undated) DEVICE — PIN FIXATION MEDT ANT CERVICAL ATLANTIS 2MM 7080902

## (undated) DEVICE — SPONGE KITTNER 30-101

## (undated) DEVICE — DRSG GAUZE 4X4" 3033

## (undated) DEVICE — DRAPE C-ARM 60X42" 1013

## (undated) DEVICE — PREP DURAPREP 26ML APL 8630

## (undated) DEVICE — PIN DISTRACTION ANCHOR FOR SCR 14MM MDS9091414

## (undated) DEVICE — GLOVE PROTEXIS BLUE W/NEU-THERA 8.0  2D73EB80

## (undated) DEVICE — SU VICRYL 4-0 PS-2 18" UND J496H

## (undated) DEVICE — SU VICRYL 3-0 RB-1 27" J305H

## (undated) RX ORDER — PROPOFOL 10 MG/ML
INJECTION, EMULSION INTRAVENOUS
Status: DISPENSED
Start: 2022-08-10

## (undated) RX ORDER — FENTANYL CITRATE 0.05 MG/ML
INJECTION, SOLUTION INTRAMUSCULAR; INTRAVENOUS
Status: DISPENSED
Start: 2022-08-10

## (undated) RX ORDER — ONDANSETRON 2 MG/ML
INJECTION INTRAMUSCULAR; INTRAVENOUS
Status: DISPENSED
Start: 2022-08-10

## (undated) RX ORDER — HYDROMORPHONE HCL IN WATER/PF 6 MG/30 ML
PATIENT CONTROLLED ANALGESIA SYRINGE INTRAVENOUS
Status: DISPENSED
Start: 2022-08-10

## (undated) RX ORDER — LIDOCAINE HYDROCHLORIDE 20 MG/ML
INJECTION, SOLUTION EPIDURAL; INFILTRATION; INTRACAUDAL; PERINEURAL
Status: DISPENSED
Start: 2022-08-10

## (undated) RX ORDER — DEXAMETHASONE SODIUM PHOSPHATE 4 MG/ML
INJECTION, SOLUTION INTRA-ARTICULAR; INTRALESIONAL; INTRAMUSCULAR; INTRAVENOUS; SOFT TISSUE
Status: DISPENSED
Start: 2022-08-10

## (undated) RX ORDER — DEXMEDETOMIDINE HYDROCHLORIDE 4 UG/ML
INJECTION, SOLUTION INTRAVENOUS
Status: DISPENSED
Start: 2022-08-10

## (undated) RX ORDER — FENTANYL CITRATE 50 UG/ML
INJECTION, SOLUTION INTRAMUSCULAR; INTRAVENOUS
Status: DISPENSED
Start: 2022-08-10